# Patient Record
Sex: MALE | Race: OTHER | HISPANIC OR LATINO | URBAN - METROPOLITAN AREA
[De-identification: names, ages, dates, MRNs, and addresses within clinical notes are randomized per-mention and may not be internally consistent; named-entity substitution may affect disease eponyms.]

---

## 2017-04-29 ENCOUNTER — INPATIENT (INPATIENT)
Facility: HOSPITAL | Age: 37
LOS: 2 days | Discharge: HOME | End: 2017-05-02
Attending: INTERNAL MEDICINE | Admitting: INTERNAL MEDICINE
Payer: MEDICAID

## 2017-04-29 VITALS
OXYGEN SATURATION: 100 % | DIASTOLIC BLOOD PRESSURE: 80 MMHG | TEMPERATURE: 98 F | HEART RATE: 52 BPM | RESPIRATION RATE: 18 BRPM | SYSTOLIC BLOOD PRESSURE: 116 MMHG

## 2017-04-29 LAB
ALBUMIN SERPL ELPH-MCNC: 4.2 G/DL — SIGNIFICANT CHANGE UP (ref 3.3–5)
ALP SERPL-CCNC: 35 U/L — LOW (ref 40–120)
ALT FLD-CCNC: 10 U/L — SIGNIFICANT CHANGE UP (ref 4–41)
AST SERPL-CCNC: 12 U/L — SIGNIFICANT CHANGE UP (ref 4–40)
BASE EXCESS BLDV CALC-SCNC: 4.5 MMOL/L — SIGNIFICANT CHANGE UP
BASOPHILS # BLD AUTO: 0.03 K/UL — SIGNIFICANT CHANGE UP (ref 0–0.2)
BASOPHILS NFR BLD AUTO: 0.5 % — SIGNIFICANT CHANGE UP (ref 0–2)
BILIRUB SERPL-MCNC: 0.2 MG/DL — SIGNIFICANT CHANGE UP (ref 0.2–1.2)
BLOOD GAS VENOUS - CREATININE: 0.77 MG/DL — SIGNIFICANT CHANGE UP (ref 0.5–1.3)
BUN SERPL-MCNC: 10 MG/DL — SIGNIFICANT CHANGE UP (ref 7–23)
CALCIUM SERPL-MCNC: 9 MG/DL — SIGNIFICANT CHANGE UP (ref 8.4–10.5)
CHLORIDE BLDV-SCNC: 105 MMOL/L — SIGNIFICANT CHANGE UP (ref 96–108)
CHLORIDE SERPL-SCNC: 102 MMOL/L — SIGNIFICANT CHANGE UP (ref 98–107)
CO2 SERPL-SCNC: 26 MMOL/L — SIGNIFICANT CHANGE UP (ref 22–31)
CREAT SERPL-MCNC: 0.83 MG/DL — SIGNIFICANT CHANGE UP (ref 0.5–1.3)
EOSINOPHIL # BLD AUTO: 0.29 K/UL — SIGNIFICANT CHANGE UP (ref 0–0.5)
EOSINOPHIL NFR BLD AUTO: 4.8 % — SIGNIFICANT CHANGE UP (ref 0–6)
GAS PNL BLDV: 138 MMOL/L — SIGNIFICANT CHANGE UP (ref 136–146)
GLUCOSE BLDV-MCNC: 102 — HIGH (ref 70–99)
GLUCOSE SERPL-MCNC: 101 MG/DL — HIGH (ref 70–99)
HCO3 BLDV-SCNC: 27 MMOL/L — SIGNIFICANT CHANGE UP (ref 20–27)
HCT VFR BLD CALC: 41.3 % — SIGNIFICANT CHANGE UP (ref 39–50)
HCT VFR BLDV CALC: 41.3 % — SIGNIFICANT CHANGE UP (ref 39–51)
HGB BLD-MCNC: 13.2 G/DL — SIGNIFICANT CHANGE UP (ref 13–17)
HGB BLDV-MCNC: 13.4 G/DL — SIGNIFICANT CHANGE UP (ref 13–17)
IMM GRANULOCYTES NFR BLD AUTO: 0.2 % — SIGNIFICANT CHANGE UP (ref 0–1.5)
LACTATE BLDV-MCNC: 1.7 MMOL/L — SIGNIFICANT CHANGE UP (ref 0.5–2)
LIDOCAIN IGE QN: 23.5 U/L — SIGNIFICANT CHANGE UP (ref 7–60)
LYMPHOCYTES # BLD AUTO: 1.61 K/UL — SIGNIFICANT CHANGE UP (ref 1–3.3)
LYMPHOCYTES # BLD AUTO: 26.8 % — SIGNIFICANT CHANGE UP (ref 13–44)
MCHC RBC-ENTMCNC: 28.3 PG — SIGNIFICANT CHANGE UP (ref 27–34)
MCHC RBC-ENTMCNC: 32 % — SIGNIFICANT CHANGE UP (ref 32–36)
MCV RBC AUTO: 88.6 FL — SIGNIFICANT CHANGE UP (ref 80–100)
MONOCYTES # BLD AUTO: 0.53 K/UL — SIGNIFICANT CHANGE UP (ref 0–0.9)
MONOCYTES NFR BLD AUTO: 8.8 % — SIGNIFICANT CHANGE UP (ref 2–14)
NEUTROPHILS # BLD AUTO: 3.53 K/UL — SIGNIFICANT CHANGE UP (ref 1.8–7.4)
NEUTROPHILS NFR BLD AUTO: 58.9 % — SIGNIFICANT CHANGE UP (ref 43–77)
PCO2 BLDV: 53 MMHG — HIGH (ref 41–51)
PH BLDV: 7.36 PH — SIGNIFICANT CHANGE UP (ref 7.32–7.43)
PLATELET # BLD AUTO: 195 K/UL — SIGNIFICANT CHANGE UP (ref 150–400)
PMV BLD: 10.2 FL — SIGNIFICANT CHANGE UP (ref 7–13)
PO2 BLDV: 32 MMHG — LOW (ref 35–40)
POTASSIUM BLDV-SCNC: 3.9 MMOL/L — SIGNIFICANT CHANGE UP (ref 3.4–4.5)
POTASSIUM SERPL-MCNC: 3.8 MMOL/L — SIGNIFICANT CHANGE UP (ref 3.5–5.3)
POTASSIUM SERPL-SCNC: 3.8 MMOL/L — SIGNIFICANT CHANGE UP (ref 3.5–5.3)
PROT SERPL-MCNC: 6.7 G/DL — SIGNIFICANT CHANGE UP (ref 6–8.3)
RBC # BLD: 4.66 M/UL — SIGNIFICANT CHANGE UP (ref 4.2–5.8)
RBC # FLD: 14.2 % — SIGNIFICANT CHANGE UP (ref 10.3–14.5)
SAO2 % BLDV: 57.5 % — LOW (ref 60–85)
SODIUM SERPL-SCNC: 141 MMOL/L — SIGNIFICANT CHANGE UP (ref 135–145)
WBC # BLD: 6 K/UL — SIGNIFICANT CHANGE UP (ref 3.8–10.5)
WBC # FLD AUTO: 6 K/UL — SIGNIFICANT CHANGE UP (ref 3.8–10.5)

## 2017-04-29 PROCEDURE — 74177 CT ABD & PELVIS W/CONTRAST: CPT | Mod: 26

## 2017-04-29 RX ORDER — SODIUM CHLORIDE 9 MG/ML
2000 INJECTION INTRAMUSCULAR; INTRAVENOUS; SUBCUTANEOUS ONCE
Qty: 0 | Refills: 0 | Status: COMPLETED | OUTPATIENT
Start: 2017-04-29 | End: 2017-04-29

## 2017-04-29 RX ORDER — MORPHINE SULFATE 50 MG/1
4 CAPSULE, EXTENDED RELEASE ORAL ONCE
Qty: 0 | Refills: 0 | Status: DISCONTINUED | OUTPATIENT
Start: 2017-04-29 | End: 2017-04-29

## 2017-04-29 RX ORDER — MORPHINE SULFATE 50 MG/1
2 CAPSULE, EXTENDED RELEASE ORAL EVERY 6 HOURS
Qty: 0 | Refills: 0 | Status: DISCONTINUED | OUTPATIENT
Start: 2017-04-29 | End: 2017-05-01

## 2017-04-29 RX ADMIN — MORPHINE SULFATE 2 MILLIGRAM(S): 50 CAPSULE, EXTENDED RELEASE ORAL at 22:05

## 2017-04-29 RX ADMIN — MORPHINE SULFATE 2 MILLIGRAM(S): 50 CAPSULE, EXTENDED RELEASE ORAL at 21:50

## 2017-04-29 RX ADMIN — SODIUM CHLORIDE 2000 MILLILITER(S): 9 INJECTION INTRAMUSCULAR; INTRAVENOUS; SUBCUTANEOUS at 20:25

## 2017-04-29 NOTE — ED ADULT NURSE REASSESSMENT NOTE - NS ED NURSE REASSESS COMMENT FT1
Patient states no change in pain after 2mg Morphine.  Will notified MD of patient pain and reassess patient.

## 2017-04-29 NOTE — ED ADULT TRIAGE NOTE - CHIEF COMPLAINT QUOTE
pt states I have flare up of ulcerative colitis with chrohns . c/o abd pain and blood in stool 8/10 pain  dealing with pain for about week

## 2017-04-29 NOTE — ED PROVIDER NOTE - PROGRESS NOTE DETAILS
CBC and CMP WNL. will get CT abd/pelvis with contrast called Alea Eric MD at [phone] - > [phone], left message about admission

## 2017-04-29 NOTE — ED PROVIDER NOTE - ATTENDING CONTRIBUTION TO CARE
I performed a face to face evaluation of this patient and performed a full history and physical examination on the patient.  I agree with the resident's history, physical examination, and plan of the patient.  35yo M PMH: UC p/w 1wk h/o worsening abdominal pain, similar to prior exacerbation prompting ED visit. Patient reports recent evaluation at [facility] with mixed picture of UC / Chron's  On exam awake & alert, mild distress, mmm, lungs CTAB no wheeze no crackle, RRR, abdomen soft diffuse tenderness, neuro A&Ox3,  skin warm and dry no rash

## 2017-04-29 NOTE — ED ADULT NURSE NOTE - OBJECTIVE STATEMENT
Pt presents to room 9, A&Ox3, ambulatory at baseline without assistance, coming in for evaluation of diffuse abdominal pain and multiple episodes of loose and bloody stool.  States blood is bright red.  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  Reports symptoms are consistent with UC/Crohns flair. Pt denies any chest pain, dizziness, nausea, vomiting, shortness of breath, palpitations, fever, or chills.  IV established in right ac with a 20g, labs drawn and sent, call bell in reach, side rails up, bed in locked position, md evaluation in progress, will continue to monitor.

## 2017-04-29 NOTE — ED PROVIDER NOTE - OBJECTIVE STATEMENT
35YO M PMHx of UC and crohns p/w abdominal pain and blood in stool for 1 week. Pt states this is his normal crohn's flare. c/o intermittent 8/10 epigastric pain and bright red blood as well as dark blood mixed in his stool. compliant on his UC meds. 35YO M PMHx of UC p/w abdominal pain and blood in stool for 1 week. Pt states this is his normal crohn's flare. c/o intermittent 8/10 epigastric pain, rectal pain, and bright red blood as well as dark blood mixed in his stool. compliant on his UC meds of apriso, canasa suppositories, and probiotic. Otherwise no fevers, chills, n/v, abdominal distension. Tolerating food but decrease PO intake because knows will cause diarrhea. States believes stress of getting disability and issues with his job caused his flare. States his GI is Dr. Nii Farr; [place]

## 2017-04-29 NOTE — ED PROVIDER NOTE - MEDICAL DECISION MAKING DETAILS
patient has a h/o UC and is coming in for abdom pain and blood in stool, this is his normal UC flare symptoms. CT abd/pelvis ordered.

## 2017-04-30 DIAGNOSIS — R63.8 OTHER SYMPTOMS AND SIGNS CONCERNING FOOD AND FLUID INTAKE: ICD-10-CM

## 2017-04-30 DIAGNOSIS — Z87.09 PERSONAL HISTORY OF OTHER DISEASES OF THE RESPIRATORY SYSTEM: Chronic | ICD-10-CM

## 2017-04-30 DIAGNOSIS — R39.15 URGENCY OF URINATION: ICD-10-CM

## 2017-04-30 DIAGNOSIS — Z29.9 ENCOUNTER FOR PROPHYLACTIC MEASURES, UNSPECIFIED: ICD-10-CM

## 2017-04-30 DIAGNOSIS — K51.90 ULCERATIVE COLITIS, UNSPECIFIED, WITHOUT COMPLICATIONS: ICD-10-CM

## 2017-04-30 DIAGNOSIS — K51.311 ULCERATIVE (CHRONIC) RECTOSIGMOIDITIS WITH RECTAL BLEEDING: ICD-10-CM

## 2017-04-30 DIAGNOSIS — B00.9 HERPESVIRAL INFECTION, UNSPECIFIED: ICD-10-CM

## 2017-04-30 LAB
APPEARANCE UR: CLEAR — SIGNIFICANT CHANGE UP
BILIRUB UR-MCNC: NEGATIVE — SIGNIFICANT CHANGE UP
BLOOD UR QL VISUAL: NEGATIVE — SIGNIFICANT CHANGE UP
BUN SERPL-MCNC: 8 MG/DL — SIGNIFICANT CHANGE UP (ref 7–23)
CALCIUM SERPL-MCNC: 8.5 MG/DL — SIGNIFICANT CHANGE UP (ref 8.4–10.5)
CHLORIDE SERPL-SCNC: 106 MMOL/L — SIGNIFICANT CHANGE UP (ref 98–107)
CO2 SERPL-SCNC: 23 MMOL/L — SIGNIFICANT CHANGE UP (ref 22–31)
COLOR SPEC: SIGNIFICANT CHANGE UP
CREAT SERPL-MCNC: 0.64 MG/DL — SIGNIFICANT CHANGE UP (ref 0.5–1.3)
ERYTHROCYTE [SEDIMENTATION RATE] IN BLOOD: 3 MM/HR — SIGNIFICANT CHANGE UP (ref 1–15)
GLUCOSE SERPL-MCNC: 91 MG/DL — SIGNIFICANT CHANGE UP (ref 70–99)
GLUCOSE UR-MCNC: NEGATIVE — SIGNIFICANT CHANGE UP
HBA1C BLD-MCNC: 5.9 % — HIGH (ref 4–5.6)
HCT VFR BLD CALC: 39.4 % — SIGNIFICANT CHANGE UP (ref 39–50)
HGB BLD-MCNC: 12.7 G/DL — LOW (ref 13–17)
KETONES UR-MCNC: NEGATIVE — SIGNIFICANT CHANGE UP
LEUKOCYTE ESTERASE UR-ACNC: NEGATIVE — SIGNIFICANT CHANGE UP
MAGNESIUM SERPL-MCNC: 2 MG/DL — SIGNIFICANT CHANGE UP (ref 1.6–2.6)
MCHC RBC-ENTMCNC: 28.5 PG — SIGNIFICANT CHANGE UP (ref 27–34)
MCHC RBC-ENTMCNC: 32.2 % — SIGNIFICANT CHANGE UP (ref 32–36)
MCV RBC AUTO: 88.5 FL — SIGNIFICANT CHANGE UP (ref 80–100)
MUCOUS THREADS # UR AUTO: SIGNIFICANT CHANGE UP
NITRITE UR-MCNC: NEGATIVE — SIGNIFICANT CHANGE UP
PH UR: 6 — SIGNIFICANT CHANGE UP (ref 4.6–8)
PHOSPHATE SERPL-MCNC: 3.8 MG/DL — SIGNIFICANT CHANGE UP (ref 2.5–4.5)
PLATELET # BLD AUTO: 178 K/UL — SIGNIFICANT CHANGE UP (ref 150–400)
PMV BLD: 10.4 FL — SIGNIFICANT CHANGE UP (ref 7–13)
POTASSIUM SERPL-MCNC: 4 MMOL/L — SIGNIFICANT CHANGE UP (ref 3.5–5.3)
POTASSIUM SERPL-SCNC: 4 MMOL/L — SIGNIFICANT CHANGE UP (ref 3.5–5.3)
PROT UR-MCNC: NEGATIVE — SIGNIFICANT CHANGE UP
RBC # BLD: 4.45 M/UL — SIGNIFICANT CHANGE UP (ref 4.2–5.8)
RBC # FLD: 14.3 % — SIGNIFICANT CHANGE UP (ref 10.3–14.5)
RBC CASTS # UR COMP ASSIST: SIGNIFICANT CHANGE UP (ref 0–?)
SODIUM SERPL-SCNC: 142 MMOL/L — SIGNIFICANT CHANGE UP (ref 135–145)
SP GR SPEC: 1.02 — SIGNIFICANT CHANGE UP (ref 1–1.03)
TSH SERPL-MCNC: 5.96 UIU/ML — HIGH (ref 0.27–4.2)
UROBILINOGEN FLD QL: NORMAL E.U. — SIGNIFICANT CHANGE UP (ref 0.1–0.2)
VIT B12 SERPL-MCNC: 305 PG/ML — SIGNIFICANT CHANGE UP (ref 200–900)
WBC # BLD: 7.42 K/UL — SIGNIFICANT CHANGE UP (ref 3.8–10.5)
WBC # FLD AUTO: 7.42 K/UL — SIGNIFICANT CHANGE UP (ref 3.8–10.5)
WBC UR QL: SIGNIFICANT CHANGE UP (ref 0–?)

## 2017-04-30 PROCEDURE — 99223 1ST HOSP IP/OBS HIGH 75: CPT

## 2017-04-30 RX ORDER — ACETAMINOPHEN 500 MG
650 TABLET ORAL ONCE
Qty: 0 | Refills: 0 | Status: COMPLETED | OUTPATIENT
Start: 2017-04-30 | End: 2017-04-30

## 2017-04-30 RX ORDER — SODIUM CHLORIDE 9 MG/ML
1000 INJECTION INTRAMUSCULAR; INTRAVENOUS; SUBCUTANEOUS
Qty: 0 | Refills: 0 | Status: DISCONTINUED | OUTPATIENT
Start: 2017-04-30 | End: 2017-05-02

## 2017-04-30 RX ORDER — FOLIC ACID 0.8 MG
1 TABLET ORAL DAILY
Qty: 0 | Refills: 0 | Status: DISCONTINUED | OUTPATIENT
Start: 2017-04-30 | End: 2017-05-02

## 2017-04-30 RX ORDER — MESALAMINE 400 MG
4 TABLET, DELAYED RELEASE (ENTERIC COATED) ORAL AT BEDTIME
Qty: 0 | Refills: 0 | Status: DISCONTINUED | OUTPATIENT
Start: 2017-04-30 | End: 2017-04-30

## 2017-04-30 RX ORDER — PANTOPRAZOLE SODIUM 20 MG/1
40 TABLET, DELAYED RELEASE ORAL
Qty: 0 | Refills: 0 | Status: DISCONTINUED | OUTPATIENT
Start: 2017-04-30 | End: 2017-05-02

## 2017-04-30 RX ADMIN — Medication 1 DROP(S): at 13:00

## 2017-04-30 RX ADMIN — Medication 1 DROP(S): at 20:38

## 2017-04-30 RX ADMIN — SODIUM CHLORIDE 125 MILLILITER(S): 9 INJECTION INTRAMUSCULAR; INTRAVENOUS; SUBCUTANEOUS at 12:23

## 2017-04-30 RX ADMIN — MORPHINE SULFATE 2 MILLIGRAM(S): 50 CAPSULE, EXTENDED RELEASE ORAL at 13:15

## 2017-04-30 RX ADMIN — PANTOPRAZOLE SODIUM 40 MILLIGRAM(S): 20 TABLET, DELAYED RELEASE ORAL at 06:00

## 2017-04-30 RX ADMIN — MORPHINE SULFATE 2 MILLIGRAM(S): 50 CAPSULE, EXTENDED RELEASE ORAL at 06:48

## 2017-04-30 RX ADMIN — MORPHINE SULFATE 2 MILLIGRAM(S): 50 CAPSULE, EXTENDED RELEASE ORAL at 20:52

## 2017-04-30 RX ADMIN — SODIUM CHLORIDE 125 MILLILITER(S): 9 INJECTION INTRAMUSCULAR; INTRAVENOUS; SUBCUTANEOUS at 07:22

## 2017-04-30 RX ADMIN — MORPHINE SULFATE 2 MILLIGRAM(S): 50 CAPSULE, EXTENDED RELEASE ORAL at 06:33

## 2017-04-30 RX ADMIN — SODIUM CHLORIDE 125 MILLILITER(S): 9 INJECTION INTRAMUSCULAR; INTRAVENOUS; SUBCUTANEOUS at 20:38

## 2017-04-30 RX ADMIN — Medication 1 MILLIGRAM(S): at 12:58

## 2017-04-30 RX ADMIN — Medication 650 MILLIGRAM(S): at 23:46

## 2017-04-30 RX ADMIN — Medication 1 DROP(S): at 06:01

## 2017-04-30 RX ADMIN — Medication 20 MILLIGRAM(S): at 06:00

## 2017-04-30 RX ADMIN — MORPHINE SULFATE 4 MILLIGRAM(S): 50 CAPSULE, EXTENDED RELEASE ORAL at 00:13

## 2017-04-30 RX ADMIN — MORPHINE SULFATE 2 MILLIGRAM(S): 50 CAPSULE, EXTENDED RELEASE ORAL at 12:58

## 2017-04-30 RX ADMIN — Medication 650 MILLIGRAM(S): at 23:01

## 2017-04-30 RX ADMIN — Medication 20 MILLIGRAM(S): at 13:00

## 2017-04-30 RX ADMIN — Medication 20 MILLIGRAM(S): at 20:38

## 2017-04-30 RX ADMIN — MORPHINE SULFATE 2 MILLIGRAM(S): 50 CAPSULE, EXTENDED RELEASE ORAL at 20:37

## 2017-04-30 RX ADMIN — MORPHINE SULFATE 4 MILLIGRAM(S): 50 CAPSULE, EXTENDED RELEASE ORAL at 00:02

## 2017-04-30 NOTE — PATIENT PROFILE ADULT. - NS TRANSFER PATIENT BELONGINGS
iphone; ipad; wallet; duffle bag/Cell Phone/PDA (specify)/Other belongings/Electronic Device (specify)/Jewelry/Money (specify)/Clothing

## 2017-04-30 NOTE — H&P ADULT. - HISTORY OF PRESENT ILLNESS
36 year old male, with PH significant for Ulcerative colitis, Condyloma, Herpes simplex II infection, R-Knee surgery, presented to the ED secondary to abdominal pain associated with bloody stools for one week.  Seen and evaluated at bedside;      Vital signs upon ED presentation as follows: BP - 116/80, HR = 52, RR = 18, T = 36.4 C (97.6 F), O2 Sat = 100% on RA.  CT A/P indicative of: "Ulcerative colitis flare of the sigmoid colon and rectum."   Diagnosed with Ulcerative Colitis.  Prescribed NaCl 2 liters, Morphine 4 mg IV x one, Morphine 2 mg IV Q6H PRN. 36 year old male, with PH significant for Ulcerative colitis, Condyloma accuminatum, Herpes simplex II infection, R-Knee surgery, presented to the ED secondary to abdominal pain associated with bloody stools for one week.  Seen and evaluated at bedside; NAD.  Patient relates that for the past 7 days he has noticing chiefly hematochezia with intermittent melena.  Initially he presumed the blood to be from hemorrhoids, but then he began to suffer with abdominal pain - chiefly of the mid upper abdominal area, with tenderness across the lower abdomen.  Patient called his gastroenterologist who advised use of suppository, enema, ice and baths (?Sitz baths).  However, although some relief was obtained from the use of suppository, the signs/symptoms persisted.  Pain was rated at 10/10 at maximum intensity and was ~ 4/10 at the time of being seen (s/p Morphine 4 mg IV).  Patient began to experience lethargy, somnolence and decreased oral intake.  No reports of fevers, chills, sweats, nausea, vomiting.  Reports compliance with prescribed medications - Apriso, Canasa and Valtrex.  Thinks that his flare is triggered by stress related to currently being on disability (due to Ulcerative colitis) and issues relative to same.    Comments that he was evaluated at the [facility] approximately one year ago and was told that he had a component of Crohn's disease in conjunction with Ulcerative colitis.    Vital signs upon ED presentation as follows: BP - 116/80, HR = 52, RR = 18, T = 36.4 C (97.6 F), O2 Sat = 100% on RA.  CT A/P indicative of: "Ulcerative colitis flare of the sigmoid colon and rectum."   Diagnosed with Ulcerative Colitis.  Prescribed NaCl 2 liters, Morphine 4 mg IV x one, Morphine 2 mg IV Q6H PRN.

## 2017-04-30 NOTE — H&P ADULT. - PROBLEM SELECTOR PLAN 3
- reports being told prostate enlarged ("normal" on CT in-House)  - has been experiencing urinary frequency  - f/u urinalysis  - PSA level

## 2017-04-30 NOTE — H&P ADULT. - ASSESSMENT
36 year old male, with PH significant for Ulcerative colitis, Condyloma accuminatum, Herpes simplex II infection, R-Knee surgery, presented to the ED secondary to abdominal pain associated with bloody stools for one week.  Vital signs upon ED presentation as follows: BP - 116/80, HR = 52, RR = 18, T = 36.4 C (97.6 F), O2 Sat = 100% on RA.  CT A/P indicative of: "Ulcerative colitis flare of the sigmoid colon and rectum."   Diagnosed with Ulcerative.  Admitted for further management of Ulcerative rectosigmoiditis with rectal bleeding, Herpes simplex type 2 infection, Urgency of micturition...

## 2017-04-30 NOTE — H&P ADULT. - PROBLEM SELECTOR PLAN 2
- no issues presently  - takes Valtrex (dose unknown) during flares (please verify medication dosage with OP pharmacy)

## 2017-04-30 NOTE — H&P ADULT. - FAMILY HISTORY
Grandparent  Still living? Unknown  Family history of diabetes mellitus, Age at diagnosis: Age Unknown  Family history of cerebrovascular accident, Age at diagnosis: Age Unknown     Father  Still living? Unknown  Family history of hypertension, Age at diagnosis: Age Unknown     Uncle  Still living? Unknown  Family history of diabetes mellitus, Age at diagnosis: Age Unknown

## 2017-04-30 NOTE — H&P ADULT. - PROBLEM SELECTOR PLAN 1
- persistent signs/symptoms x 1 week (abd pain, with subsequent hematochezia/melena)  - not ameliorated with suppositories, enema, ice packs or baths  - thinks triggered by stress  - cites compliance with OP medication regimen (Apriso, Canasa)  - started on Prednisone 20 mg Q8H  - report of allergy to Mesalamine - not started presently, even though patient was discharged on same at last admission  - IVF hydration  - Morphine PRN  - GI consult in the AM (e-mailed; please follow-up)

## 2017-04-30 NOTE — H&P ADULT. - PMH
Condyloma    Herpes simplex type 2 infection    Polyp of colon, unspecified part of colon, unspecified type  ~ 60 per documentation in previous H&P (per Dr Gary - Surgery)  Ulcerative colitis

## 2017-04-30 NOTE — H&P ADULT. - GASTROINTESTINAL COMMENTS
pain of mid upper abd and tenderness across lower abdomen occasional guarding, moderately hyperactive bowel sounds, tenderness over mid upper abd/epigastrium and across lower abdomen

## 2017-05-01 PROCEDURE — 45331 SIGMOIDOSCOPY AND BIOPSY: CPT | Mod: GC

## 2017-05-01 RX ORDER — MESALAMINE 400 MG
0.38 TABLET, DELAYED RELEASE (ENTERIC COATED) ORAL DAILY
Qty: 0 | Refills: 0 | Status: DISCONTINUED | OUTPATIENT
Start: 2017-05-01 | End: 2017-05-02

## 2017-05-01 RX ORDER — MORPHINE SULFATE 50 MG/1
2 CAPSULE, EXTENDED RELEASE ORAL EVERY 6 HOURS
Qty: 0 | Refills: 0 | Status: DISCONTINUED | OUTPATIENT
Start: 2017-05-01 | End: 2017-05-02

## 2017-05-01 RX ORDER — MESALAMINE 400 MG
1000 TABLET, DELAYED RELEASE (ENTERIC COATED) ORAL EVERY 6 HOURS
Qty: 0 | Refills: 0 | Status: DISCONTINUED | OUTPATIENT
Start: 2017-05-01 | End: 2017-05-01

## 2017-05-01 RX ORDER — MESALAMINE 400 MG
4 TABLET, DELAYED RELEASE (ENTERIC COATED) ORAL AT BEDTIME
Qty: 0 | Refills: 0 | Status: DISCONTINUED | OUTPATIENT
Start: 2017-05-01 | End: 2017-05-02

## 2017-05-01 RX ADMIN — Medication 1 DROP(S): at 22:51

## 2017-05-01 RX ADMIN — MORPHINE SULFATE 2 MILLIGRAM(S): 50 CAPSULE, EXTENDED RELEASE ORAL at 19:34

## 2017-05-01 RX ADMIN — Medication 0.38 GRAM(S): at 22:51

## 2017-05-01 RX ADMIN — Medication 40 MILLIGRAM(S): at 19:18

## 2017-05-01 RX ADMIN — MORPHINE SULFATE 2 MILLIGRAM(S): 50 CAPSULE, EXTENDED RELEASE ORAL at 19:19

## 2017-05-01 RX ADMIN — MORPHINE SULFATE 2 MILLIGRAM(S): 50 CAPSULE, EXTENDED RELEASE ORAL at 10:13

## 2017-05-01 RX ADMIN — Medication 1 DROP(S): at 06:06

## 2017-05-01 RX ADMIN — MORPHINE SULFATE 2 MILLIGRAM(S): 50 CAPSULE, EXTENDED RELEASE ORAL at 09:58

## 2017-05-01 RX ADMIN — Medication 4 GRAM(S): at 22:50

## 2017-05-02 ENCOUNTER — TRANSCRIPTION ENCOUNTER (OUTPATIENT)
Age: 37
End: 2017-05-02

## 2017-05-02 VITALS
TEMPERATURE: 99 F | HEART RATE: 74 BPM | OXYGEN SATURATION: 100 % | DIASTOLIC BLOOD PRESSURE: 69 MMHG | RESPIRATION RATE: 18 BRPM | SYSTOLIC BLOOD PRESSURE: 129 MMHG

## 2017-05-02 LAB
BUN SERPL-MCNC: 8 MG/DL — SIGNIFICANT CHANGE UP (ref 7–23)
CALCIUM SERPL-MCNC: 8.4 MG/DL — SIGNIFICANT CHANGE UP (ref 8.4–10.5)
CHLORIDE SERPL-SCNC: 104 MMOL/L — SIGNIFICANT CHANGE UP (ref 98–107)
CO2 SERPL-SCNC: 22 MMOL/L — SIGNIFICANT CHANGE UP (ref 22–31)
CREAT SERPL-MCNC: 0.73 MG/DL — SIGNIFICANT CHANGE UP (ref 0.5–1.3)
GLUCOSE SERPL-MCNC: 173 MG/DL — HIGH (ref 70–99)
HBV SURFACE AB SER-ACNC: NONREACTIVE — SIGNIFICANT CHANGE UP
HCT VFR BLD CALC: 40.8 % — SIGNIFICANT CHANGE UP (ref 39–50)
HGB BLD-MCNC: 12.9 G/DL — LOW (ref 13–17)
MCHC RBC-ENTMCNC: 28 PG — SIGNIFICANT CHANGE UP (ref 27–34)
MCHC RBC-ENTMCNC: 31.6 % — LOW (ref 32–36)
MCV RBC AUTO: 88.5 FL — SIGNIFICANT CHANGE UP (ref 80–100)
PLATELET # BLD AUTO: 202 K/UL — SIGNIFICANT CHANGE UP (ref 150–400)
PMV BLD: 10.3 FL — SIGNIFICANT CHANGE UP (ref 7–13)
POTASSIUM SERPL-MCNC: 4.3 MMOL/L — SIGNIFICANT CHANGE UP (ref 3.5–5.3)
POTASSIUM SERPL-SCNC: 4.3 MMOL/L — SIGNIFICANT CHANGE UP (ref 3.5–5.3)
RBC # BLD: 4.61 M/UL — SIGNIFICANT CHANGE UP (ref 4.2–5.8)
RBC # FLD: 13.9 % — SIGNIFICANT CHANGE UP (ref 10.3–14.5)
SODIUM SERPL-SCNC: 139 MMOL/L — SIGNIFICANT CHANGE UP (ref 135–145)
SPECIMEN SOURCE: SIGNIFICANT CHANGE UP
WBC # BLD: 8.05 K/UL — SIGNIFICANT CHANGE UP (ref 3.8–10.5)
WBC # FLD AUTO: 8.05 K/UL — SIGNIFICANT CHANGE UP (ref 3.8–10.5)

## 2017-05-02 PROCEDURE — 99232 SBSQ HOSP IP/OBS MODERATE 35: CPT | Mod: GC

## 2017-05-02 RX ORDER — MESALAMINE 400 MG
60 TABLET, DELAYED RELEASE (ENTERIC COATED) ORAL
Qty: 30 | Refills: 0 | OUTPATIENT
Start: 2017-05-02 | End: 2017-06-01

## 2017-05-02 RX ORDER — MESALAMINE 400 MG
4 TABLET, DELAYED RELEASE (ENTERIC COATED) ORAL
Qty: 0 | Refills: 0 | COMMUNITY

## 2017-05-02 RX ORDER — FOLIC ACID 0.8 MG
1 TABLET ORAL
Qty: 30 | Refills: 0 | OUTPATIENT
Start: 2017-05-02 | End: 2017-06-01

## 2017-05-02 RX ORDER — PANTOPRAZOLE SODIUM 20 MG/1
1 TABLET, DELAYED RELEASE ORAL
Qty: 30 | Refills: 0 | OUTPATIENT
Start: 2017-05-02 | End: 2017-06-01

## 2017-05-02 RX ADMIN — Medication 1 DROP(S): at 13:28

## 2017-05-02 RX ADMIN — PANTOPRAZOLE SODIUM 40 MILLIGRAM(S): 20 TABLET, DELAYED RELEASE ORAL at 06:52

## 2017-05-02 RX ADMIN — MORPHINE SULFATE 2 MILLIGRAM(S): 50 CAPSULE, EXTENDED RELEASE ORAL at 04:30

## 2017-05-02 RX ADMIN — Medication 0.38 GRAM(S): at 11:48

## 2017-05-02 RX ADMIN — MORPHINE SULFATE 2 MILLIGRAM(S): 50 CAPSULE, EXTENDED RELEASE ORAL at 04:08

## 2017-05-02 RX ADMIN — MORPHINE SULFATE 2 MILLIGRAM(S): 50 CAPSULE, EXTENDED RELEASE ORAL at 13:27

## 2017-05-02 RX ADMIN — Medication 40 MILLIGRAM(S): at 06:52

## 2017-05-02 RX ADMIN — Medication 1 MILLIGRAM(S): at 11:47

## 2017-05-02 RX ADMIN — MORPHINE SULFATE 2 MILLIGRAM(S): 50 CAPSULE, EXTENDED RELEASE ORAL at 13:42

## 2017-05-02 NOTE — DISCHARGE NOTE ADULT - CARE PROVIDERS DIRECT ADDRESSES
[email or web address]

## 2017-05-02 NOTE — DISCHARGE NOTE ADULT - CARE PLAN
Principal Discharge DX:	Ulcerative colitis without complications, unspecified location  Goal:	Pt will be free from  Instructions for follow-up, activity and diet:	Follow up with your GI doctor in 1-2 weeks. Call for an appointment Principal Discharge DX:	Ulcerative colitis without complications, unspecified location  Goal:	Pt will be free from  Instructions for follow-up, activity and diet:	Follow up with your GI doctor in 1 week. Call for an appointment

## 2017-05-02 NOTE — DISCHARGE NOTE ADULT - PATIENT PORTAL LINK FT
“You can access the [facility] Patient Portal, offered by [facility], by registering with the following website: [email or web address]”

## 2017-05-02 NOTE — DISCHARGE NOTE ADULT - MEDICATION SUMMARY - MEDICATIONS TO STOP TAKING
I will STOP taking the medications listed below when I get home from the hospital:    balsalazide 750 mg oral capsule  -- 3 cap(s) by mouth 3 times a day  -- Finish all this medication unless otherwise directed by prescriber.

## 2017-05-02 NOTE — DISCHARGE NOTE ADULT - PLAN OF CARE
Follow up with your GI doctor in 1-2 weeks. Call for an appointment Pt will be free from Follow up with your GI doctor in 1 week. Call for an appointment

## 2017-05-02 NOTE — DISCHARGE NOTE ADULT - MEDICATION SUMMARY - MEDICATIONS TO TAKE
I will START or STAY ON the medications listed below when I get home from the hospital:    mesalamine 4 g/60 mL rectal enema  -- 60 milliliter(s) rectally once a day (at bedtime)  -- Indication: For Ulcerative colitis     Apriso 0.375 g oral capsule, extended release  -- 4 cap(s) by mouth once a day  -- Indication: For Ulcerative colitis    predniSONE 20 mg oral tablet  -- 2 tab(s) by mouth once a day  -- Indication: For Ulcerative colitis     ocular lubricant ophthalmic solution  -- 1 drop(s) to each affected eye every 8 hours, As Needed  -- Indication: For Dry Eyes    pantoprazole 40 mg oral delayed release tablet  -- 1 tab(s) by mouth once a day (before a meal)  -- Indication: For GI ppx    folic acid 1 mg oral tablet  -- 1 tab(s) by mouth once a day  -- Indication: For Supplement I will START or STAY ON the medications listed below when I get home from the hospital:    mesalamine 4 g/60 mL rectal enema  -- 60 milliliter(s) rectally once a day (at bedtime)  -- Indication: For Ulcerative colitis     Apriso 0.375 g oral capsule, extended release  -- 1 cap(s) by mouth once a day  -- Indication: For Ulcerative colitis without complications    predniSONE 20 mg oral tablet  -- 2 tab(s) by mouth once a day  -- Indication: For Ulcerative colitis     ocular lubricant ophthalmic solution  -- 1 drop(s) to each affected eye every 8 hours, As Needed  -- Indication: For Dry Eyes    pantoprazole 40 mg oral delayed release tablet  -- 1 tab(s) by mouth once a day (before a meal)  -- Indication: For GI ppx    folic acid 1 mg oral tablet  -- 1 tab(s) by mouth once a day  -- Indication: For Supplement

## 2017-05-02 NOTE — DISCHARGE NOTE ADULT - CARE PROVIDER_API CALL
Brannon Garces), Gastroenterology; Internal Medicine  [address] [address] [place] [id number]  Phone: [phone]  Fax: [phone] Hernesto Cosby (MD), Internal Medicine  [address] [place] [address]  Phone: [phone]  Fax: [phone]

## 2017-05-02 NOTE — DISCHARGE NOTE ADULT - HOSPITAL COURSE
36 year old male, with PH significant for Ulcerative colitis, Condyloma acuminatum Herpes simplex II infection, R-Knee surgery, presented to the ED secondary to abdominal pain associated with bloody stools for one week. CT A/P indicative of: "Ulcerative colitis flare of the sigmoid colon and rectum."   Diagnosed with Ulcerative.  Admitted for further management of Ulcerative rectosigmoiditis with rectal bleeding, Herpes simplex type 2 infection, Urgency of micturition      Hospital Course   Ulcerative rectosigmoiditis with rectal bleeding.    - Persistent signs/symptoms x 1 wk  - Cites compliance with OP medication regimen (Apriso, Canasa)  - Prednisone   - Protonix daily.   - IVF hydration  - Morphine PRN  - House GI      - Flex sig: Polypoid lesion in the descending and transverse colon likely inflammatory pseudopolyps. Biopsied. Normal mucosa in the descending and transverse  colon biopsied      - Stool Cx- Pending results        Herpes simplex type 2 infection.    - no issues presently  - takes Valtrex (dose unknown) during flares. Filled prescription and has it if he needs it.      Urgency of urination  - reports being told prostate enlarged ("normal" on CT in-House)  - has been experiencing urinary frequency  - UA negative   - PSA level. WNL      Need for prophylactic measure  - OOB to chair BID  - ambulate as tolerated        Dispo: Home 36 year old male, with PH significant for Ulcerative colitis, Condyloma acuminatum Herpes simplex II infection, R-Knee surgery, presented to the ED secondary to abdominal pain associated with bloody stools for one week. CT A/P indicative of: "Ulcerative colitis flare of the sigmoid colon and rectum."   Diagnosed with Ulcerative.  Admitted for further management of Ulcerative rectosigmoiditis with rectal bleeding, Herpes simplex type 2 infection, Urgency of micturition      Hospital Course   Ulcerative rectosigmoiditis with rectal bleeding.    - Persistent signs/symptoms x 1 wk  - Cites compliance with OP medication regimen (Apriso, Canasa)  - Prednisone   - Protonix daily.   - IVF hydration  - Morphine PRN  - House GI      - Flex sig: Polypoid lesion in the descending and transverse colon likely inflammatory pseudopolyps. Biopsied. Normal mucosa in the descending and transverse  colon biopsied      - Stool Cx- Negative         Herpes simplex type 2 infection.    - no issues presently  - takes Valtrex (dose unknown) during flares. Filled prescription and has it if he needs it.      Urgency of urination  - reports being told prostate enlarged ("normal" on CT in-House)  - has been experiencing urinary frequency  - UA negative   - PSA level. WNL      Need for prophylactic measure  - OOB to chair BID  - ambulate as tolerated        Dispo: Home

## 2017-05-03 LAB
BACTERIA STL CULT: SIGNIFICANT CHANGE UP
M TB TUBERC IFN-G BLD QL: 0 IU/ML — SIGNIFICANT CHANGE UP
M TB TUBERC IFN-G BLD QL: 0.02 IU/ML — SIGNIFICANT CHANGE UP
M TB TUBERC IFN-G BLD QL: NEGATIVE — SIGNIFICANT CHANGE UP
MITOGEN IGNF BCKGRD COR BLD-ACNC: 9.18 IU/ML — SIGNIFICANT CHANGE UP
O+P SPEC CONC: SIGNIFICANT CHANGE UP
SPECIMEN SOURCE: SIGNIFICANT CHANGE UP
TRI STN SPEC: SIGNIFICANT CHANGE UP

## 2017-08-19 ENCOUNTER — INPATIENT (INPATIENT)
Facility: HOSPITAL | Age: 37
LOS: 2 days | Discharge: HOME | End: 2017-08-22
Attending: INTERNAL MEDICINE | Admitting: INTERNAL MEDICINE
Payer: MEDICAID

## 2017-08-19 VITALS
HEART RATE: 55 BPM | SYSTOLIC BLOOD PRESSURE: 132 MMHG | TEMPERATURE: 98 F | WEIGHT: 169.98 LBS | OXYGEN SATURATION: 100 % | HEIGHT: 71 IN | RESPIRATION RATE: 17 BRPM | DIASTOLIC BLOOD PRESSURE: 81 MMHG

## 2017-08-19 DIAGNOSIS — K51.919 ULCERATIVE COLITIS, UNSPECIFIED WITH UNSPECIFIED COMPLICATIONS: ICD-10-CM

## 2017-08-19 DIAGNOSIS — K63.5 POLYP OF COLON: ICD-10-CM

## 2017-08-19 DIAGNOSIS — Z87.09 PERSONAL HISTORY OF OTHER DISEASES OF THE RESPIRATORY SYSTEM: Chronic | ICD-10-CM

## 2017-08-19 LAB
ALBUMIN SERPL ELPH-MCNC: 4.5 G/DL — SIGNIFICANT CHANGE UP (ref 3.3–5)
ALP SERPL-CCNC: 33 U/L — LOW (ref 40–120)
ALT FLD-CCNC: 15 U/L — SIGNIFICANT CHANGE UP (ref 4–41)
APPEARANCE UR: CLEAR — SIGNIFICANT CHANGE UP
AST SERPL-CCNC: 35 U/L — SIGNIFICANT CHANGE UP (ref 4–40)
BASE EXCESS BLDV CALC-SCNC: 2.3 MMOL/L — SIGNIFICANT CHANGE UP
BASOPHILS # BLD AUTO: 0.03 K/UL — SIGNIFICANT CHANGE UP (ref 0–0.2)
BASOPHILS NFR BLD AUTO: 0.4 % — SIGNIFICANT CHANGE UP (ref 0–2)
BILIRUB SERPL-MCNC: 0.3 MG/DL — SIGNIFICANT CHANGE UP (ref 0.2–1.2)
BILIRUB UR-MCNC: NEGATIVE — SIGNIFICANT CHANGE UP
BLOOD GAS VENOUS - CREATININE: 0.73 MG/DL — SIGNIFICANT CHANGE UP (ref 0.5–1.3)
BLOOD UR QL VISUAL: NEGATIVE — SIGNIFICANT CHANGE UP
BUN SERPL-MCNC: 5 MG/DL — LOW (ref 7–23)
BUN SERPL-MCNC: 7 MG/DL — SIGNIFICANT CHANGE UP (ref 7–23)
CALCIUM SERPL-MCNC: 9.5 MG/DL — SIGNIFICANT CHANGE UP (ref 8.4–10.5)
CALCIUM SERPL-MCNC: 9.7 MG/DL — SIGNIFICANT CHANGE UP (ref 8.4–10.5)
CHLORIDE BLDV-SCNC: 100 MMOL/L — SIGNIFICANT CHANGE UP (ref 96–108)
CHLORIDE SERPL-SCNC: 100 MMOL/L — SIGNIFICANT CHANGE UP (ref 98–107)
CHLORIDE SERPL-SCNC: 97 MMOL/L — LOW (ref 98–107)
CO2 SERPL-SCNC: 19 MMOL/L — LOW (ref 22–31)
CO2 SERPL-SCNC: 26 MMOL/L — SIGNIFICANT CHANGE UP (ref 22–31)
COLOR SPEC: YELLOW — SIGNIFICANT CHANGE UP
CREAT SERPL-MCNC: 0.75 MG/DL — SIGNIFICANT CHANGE UP (ref 0.5–1.3)
CREAT SERPL-MCNC: 0.87 MG/DL — SIGNIFICANT CHANGE UP (ref 0.5–1.3)
EOSINOPHIL # BLD AUTO: 0.24 K/UL — SIGNIFICANT CHANGE UP (ref 0–0.5)
EOSINOPHIL NFR BLD AUTO: 3.2 % — SIGNIFICANT CHANGE UP (ref 0–6)
GAS PNL BLDV: 131 MMOL/L — LOW (ref 136–146)
GLUCOSE BLDV-MCNC: 137 — HIGH (ref 70–99)
GLUCOSE SERPL-MCNC: 112 MG/DL — HIGH (ref 70–99)
GLUCOSE SERPL-MCNC: 133 MG/DL — HIGH (ref 70–99)
GLUCOSE UR-MCNC: NEGATIVE — SIGNIFICANT CHANGE UP
HCO3 BLDV-SCNC: 26 MMOL/L — SIGNIFICANT CHANGE UP (ref 20–27)
HCT VFR BLD CALC: 43.8 % — SIGNIFICANT CHANGE UP (ref 39–50)
HCT VFR BLD CALC: 46.2 % — SIGNIFICANT CHANGE UP (ref 39–50)
HCT VFR BLDV CALC: 46.2 % — SIGNIFICANT CHANGE UP (ref 39–51)
HGB BLD-MCNC: 14.1 G/DL — SIGNIFICANT CHANGE UP (ref 13–17)
HGB BLD-MCNC: 15.1 G/DL — SIGNIFICANT CHANGE UP (ref 13–17)
HGB BLDV-MCNC: 15.1 G/DL — SIGNIFICANT CHANGE UP (ref 13–17)
IMM GRANULOCYTES # BLD AUTO: 0.01 # — SIGNIFICANT CHANGE UP
IMM GRANULOCYTES NFR BLD AUTO: 0.1 % — SIGNIFICANT CHANGE UP (ref 0–1.5)
KETONES UR-MCNC: SIGNIFICANT CHANGE UP
LACTATE BLDV-MCNC: 2.9 MMOL/L — HIGH (ref 0.5–2)
LDH SERPL L TO P-CCNC: 141 U/L — SIGNIFICANT CHANGE UP (ref 135–225)
LEUKOCYTE ESTERASE UR-ACNC: NEGATIVE — SIGNIFICANT CHANGE UP
LIDOCAIN IGE QN: 18.4 U/L — SIGNIFICANT CHANGE UP (ref 7–60)
LYMPHOCYTES # BLD AUTO: 2.09 K/UL — SIGNIFICANT CHANGE UP (ref 1–3.3)
LYMPHOCYTES # BLD AUTO: 28 % — SIGNIFICANT CHANGE UP (ref 13–44)
MAGNESIUM SERPL-MCNC: 2 MG/DL — SIGNIFICANT CHANGE UP (ref 1.6–2.6)
MCHC RBC-ENTMCNC: 27.4 PG — SIGNIFICANT CHANGE UP (ref 27–34)
MCHC RBC-ENTMCNC: 27.9 PG — SIGNIFICANT CHANGE UP (ref 27–34)
MCHC RBC-ENTMCNC: 32.2 % — SIGNIFICANT CHANGE UP (ref 32–36)
MCHC RBC-ENTMCNC: 32.7 % — SIGNIFICANT CHANGE UP (ref 32–36)
MCV RBC AUTO: 85 FL — SIGNIFICANT CHANGE UP (ref 80–100)
MCV RBC AUTO: 85.4 FL — SIGNIFICANT CHANGE UP (ref 80–100)
MONOCYTES # BLD AUTO: 0.66 K/UL — SIGNIFICANT CHANGE UP (ref 0–0.9)
MONOCYTES NFR BLD AUTO: 8.8 % — SIGNIFICANT CHANGE UP (ref 2–14)
MUCOUS THREADS # UR AUTO: SIGNIFICANT CHANGE UP
NEUTROPHILS # BLD AUTO: 4.44 K/UL — SIGNIFICANT CHANGE UP (ref 1.8–7.4)
NEUTROPHILS NFR BLD AUTO: 59.5 % — SIGNIFICANT CHANGE UP (ref 43–77)
NITRITE UR-MCNC: NEGATIVE — SIGNIFICANT CHANGE UP
NRBC # FLD: 0 — SIGNIFICANT CHANGE UP
NRBC # FLD: 0 — SIGNIFICANT CHANGE UP
PCO2 BLDV: 36 MMHG — LOW (ref 41–51)
PH BLDV: 7.47 PH — HIGH (ref 7.32–7.43)
PH UR: 6.5 — SIGNIFICANT CHANGE UP (ref 4.6–8)
PHOSPHATE SERPL-MCNC: 4 MG/DL — SIGNIFICANT CHANGE UP (ref 2.5–4.5)
PLATELET # BLD AUTO: 185 K/UL — SIGNIFICANT CHANGE UP (ref 150–400)
PLATELET # BLD AUTO: 226 K/UL — SIGNIFICANT CHANGE UP (ref 150–400)
PMV BLD: 10 FL — SIGNIFICANT CHANGE UP (ref 7–13)
PMV BLD: 10.3 FL — SIGNIFICANT CHANGE UP (ref 7–13)
PO2 BLDV: 25 MMHG — LOW (ref 35–40)
POTASSIUM BLDV-SCNC: 8 MMOL/L — CRITICAL HIGH (ref 3.4–4.5)
POTASSIUM SERPL-MCNC: 3.9 MMOL/L — SIGNIFICANT CHANGE UP (ref 3.5–5.3)
POTASSIUM SERPL-MCNC: 5.2 MMOL/L — SIGNIFICANT CHANGE UP (ref 3.5–5.3)
POTASSIUM SERPL-SCNC: 3.9 MMOL/L — SIGNIFICANT CHANGE UP (ref 3.5–5.3)
POTASSIUM SERPL-SCNC: 5.2 MMOL/L — SIGNIFICANT CHANGE UP (ref 3.5–5.3)
PROT SERPL-MCNC: 7.8 G/DL — SIGNIFICANT CHANGE UP (ref 6–8.3)
PROT UR-MCNC: 30 — SIGNIFICANT CHANGE UP
RBC # BLD: 5.15 M/UL — SIGNIFICANT CHANGE UP (ref 4.2–5.8)
RBC # BLD: 5.41 M/UL — SIGNIFICANT CHANGE UP (ref 4.2–5.8)
RBC # FLD: 13.8 % — SIGNIFICANT CHANGE UP (ref 10.3–14.5)
RBC # FLD: 13.9 % — SIGNIFICANT CHANGE UP (ref 10.3–14.5)
RBC CASTS # UR COMP ASSIST: SIGNIFICANT CHANGE UP (ref 0–?)
SAO2 % BLDV: 46.1 % — LOW (ref 60–85)
SODIUM SERPL-SCNC: 137 MMOL/L — SIGNIFICANT CHANGE UP (ref 135–145)
SODIUM SERPL-SCNC: 139 MMOL/L — SIGNIFICANT CHANGE UP (ref 135–145)
SP GR SPEC: > 1.04 — HIGH (ref 1–1.03)
UROBILINOGEN FLD QL: NORMAL E.U. — SIGNIFICANT CHANGE UP (ref 0.1–0.2)
WBC # BLD: 10.46 K/UL — SIGNIFICANT CHANGE UP (ref 3.8–10.5)
WBC # BLD: 7.47 K/UL — SIGNIFICANT CHANGE UP (ref 3.8–10.5)
WBC # FLD AUTO: 10.46 K/UL — SIGNIFICANT CHANGE UP (ref 3.8–10.5)
WBC # FLD AUTO: 7.47 K/UL — SIGNIFICANT CHANGE UP (ref 3.8–10.5)
WBC UR QL: SIGNIFICANT CHANGE UP (ref 0–?)

## 2017-08-19 PROCEDURE — 74177 CT ABD & PELVIS W/CONTRAST: CPT | Mod: 26

## 2017-08-19 RX ORDER — MORPHINE SULFATE 50 MG/1
1 CAPSULE, EXTENDED RELEASE ORAL EVERY 6 HOURS
Qty: 0 | Refills: 0 | Status: DISCONTINUED | OUTPATIENT
Start: 2017-08-19 | End: 2017-08-20

## 2017-08-19 RX ORDER — MESALAMINE 400 MG
4 TABLET, DELAYED RELEASE (ENTERIC COATED) ORAL AT BEDTIME
Qty: 0 | Refills: 0 | Status: DISCONTINUED | OUTPATIENT
Start: 2017-08-19 | End: 2017-08-22

## 2017-08-19 RX ORDER — SODIUM CHLORIDE 9 MG/ML
1000 INJECTION, SOLUTION INTRAVENOUS ONCE
Qty: 0 | Refills: 0 | Status: COMPLETED | OUTPATIENT
Start: 2017-08-19 | End: 2017-08-19

## 2017-08-19 RX ORDER — FOLIC ACID 0.8 MG
1 TABLET ORAL DAILY
Qty: 0 | Refills: 0 | Status: DISCONTINUED | OUTPATIENT
Start: 2017-08-19 | End: 2017-08-22

## 2017-08-19 RX ORDER — MORPHINE SULFATE 50 MG/1
4 CAPSULE, EXTENDED RELEASE ORAL ONCE
Qty: 0 | Refills: 0 | Status: DISCONTINUED | OUTPATIENT
Start: 2017-08-19 | End: 2017-08-19

## 2017-08-19 RX ORDER — ACETAMINOPHEN 500 MG
650 TABLET ORAL EVERY 6 HOURS
Qty: 0 | Refills: 0 | Status: DISCONTINUED | OUTPATIENT
Start: 2017-08-19 | End: 2017-08-22

## 2017-08-19 RX ORDER — ONDANSETRON 8 MG/1
4 TABLET, FILM COATED ORAL ONCE
Qty: 0 | Refills: 0 | Status: COMPLETED | OUTPATIENT
Start: 2017-08-19 | End: 2017-08-19

## 2017-08-19 RX ORDER — MORPHINE SULFATE 50 MG/1
2 CAPSULE, EXTENDED RELEASE ORAL EVERY 4 HOURS
Qty: 0 | Refills: 0 | Status: DISCONTINUED | OUTPATIENT
Start: 2017-08-19 | End: 2017-08-20

## 2017-08-19 RX ORDER — PANTOPRAZOLE SODIUM 20 MG/1
40 TABLET, DELAYED RELEASE ORAL
Qty: 0 | Refills: 0 | Status: DISCONTINUED | OUTPATIENT
Start: 2017-08-19 | End: 2017-08-22

## 2017-08-19 RX ADMIN — MORPHINE SULFATE 4 MILLIGRAM(S): 50 CAPSULE, EXTENDED RELEASE ORAL at 02:28

## 2017-08-19 RX ADMIN — SODIUM CHLORIDE 2000 MILLILITER(S): 9 INJECTION, SOLUTION INTRAVENOUS at 08:15

## 2017-08-19 RX ADMIN — MORPHINE SULFATE 2 MILLIGRAM(S): 50 CAPSULE, EXTENDED RELEASE ORAL at 22:19

## 2017-08-19 RX ADMIN — MORPHINE SULFATE 2 MILLIGRAM(S): 50 CAPSULE, EXTENDED RELEASE ORAL at 13:09

## 2017-08-19 RX ADMIN — MORPHINE SULFATE 1 MILLIGRAM(S): 50 CAPSULE, EXTENDED RELEASE ORAL at 20:59

## 2017-08-19 RX ADMIN — Medication 4 GRAM(S): at 22:05

## 2017-08-19 RX ADMIN — Medication 1 MILLIGRAM(S): at 11:23

## 2017-08-19 RX ADMIN — MORPHINE SULFATE 4 MILLIGRAM(S): 50 CAPSULE, EXTENDED RELEASE ORAL at 06:00

## 2017-08-19 RX ADMIN — MORPHINE SULFATE 2 MILLIGRAM(S): 50 CAPSULE, EXTENDED RELEASE ORAL at 12:54

## 2017-08-19 RX ADMIN — MORPHINE SULFATE 2 MILLIGRAM(S): 50 CAPSULE, EXTENDED RELEASE ORAL at 17:20

## 2017-08-19 RX ADMIN — MORPHINE SULFATE 2 MILLIGRAM(S): 50 CAPSULE, EXTENDED RELEASE ORAL at 08:42

## 2017-08-19 RX ADMIN — MORPHINE SULFATE 1 MILLIGRAM(S): 50 CAPSULE, EXTENDED RELEASE ORAL at 20:44

## 2017-08-19 RX ADMIN — ONDANSETRON 4 MILLIGRAM(S): 8 TABLET, FILM COATED ORAL at 02:28

## 2017-08-19 RX ADMIN — MORPHINE SULFATE 1 MILLIGRAM(S): 50 CAPSULE, EXTENDED RELEASE ORAL at 11:38

## 2017-08-19 RX ADMIN — MORPHINE SULFATE 2 MILLIGRAM(S): 50 CAPSULE, EXTENDED RELEASE ORAL at 22:04

## 2017-08-19 RX ADMIN — MORPHINE SULFATE 2 MILLIGRAM(S): 50 CAPSULE, EXTENDED RELEASE ORAL at 08:27

## 2017-08-19 RX ADMIN — MORPHINE SULFATE 4 MILLIGRAM(S): 50 CAPSULE, EXTENDED RELEASE ORAL at 05:42

## 2017-08-19 RX ADMIN — MORPHINE SULFATE 4 MILLIGRAM(S): 50 CAPSULE, EXTENDED RELEASE ORAL at 02:45

## 2017-08-19 RX ADMIN — Medication 125 MILLIGRAM(S): at 11:23

## 2017-08-19 RX ADMIN — MORPHINE SULFATE 2 MILLIGRAM(S): 50 CAPSULE, EXTENDED RELEASE ORAL at 17:35

## 2017-08-19 RX ADMIN — MORPHINE SULFATE 1 MILLIGRAM(S): 50 CAPSULE, EXTENDED RELEASE ORAL at 11:23

## 2017-08-19 NOTE — ED PROVIDER NOTE - MEDICAL DECISION MAKING DETAILS
37 yo M 37 yo M with history of Ulcerative colitis presenting with abdominal pain nausea and vomiting x 5 days worsening today. PT had last bout in May. Pt diagnosed with indeterminate colitis at the [facility], currently on immunosuppressants medication. Complains of constipation which is similar to prior flarres. Pt with tenderness in entire abdomen. Will obtain ct a/p labs, pain meds. likely admit

## 2017-08-19 NOTE — CONSULT NOTE ADULT - ASSESSMENT
Impression:  1) Abdominal pain, nausea/vomiting: differential includes IBD flare, constipation, infection.  2) Ulcerative colitis on Apriso and Canasa   3) Abnormal CT scan - CT suggestive of "acute ulcerative colitis flare of the distal rectosigmoid colon" which does not correlate to location of abdominal pain nor is the patient experiencing tenesmus, hematochezia, or diarrhea.     Plan:  - send C diff, stool culture, stool O/P, ESR, CRP, fecal calprotectin  - pending above, may need EGD +/- colonoscopy  - pain control, IVF per primary  - continue UC medications   - diet as tolerated Impression:  1) Abdominal pain, nausea/vomiting: differential includes IBD flare, constipation, infection.  2) Ulcerative colitis on Apriso and Canasa   3) Abnormal CT scan - CT suggestive of "acute ulcerative colitis flare of the distal rectosigmoid colon" which does not correlate to location of abdominal pain nor is the patient experiencing tenesmus, hematochezia, or diarrhea.     Plan:  - send C diff, stool culture, stool O/P, ESR, CRP, fecal calprotectin  - given predominantly upper GI symptoms, will plan for EGD Monday to determine etiology of abdominal pain and for biopsies to rule out upper GI involvement of IBD (Crohn's disease)   - pain control, IVF per primary  - continue UC medications   - diet as tolerated  - patient to bring in colonoscopy report from 2017 Impression:  1) Abdominal pain, nausea/vomiting: differential includes IBD flare, constipation, gastroenteritis  2) Ulcerative colitis on Apriso and Canasa.  Per patient, previously diagnosed with "indeterminate colitis"  3) Abnormal CT scan - CT suggestive of "acute ulcerative colitis flare of the distal rectosigmoid colon" which does not correlate to location of abdominal pain nor is the patient experiencing tenesmus, hematochezia, or diarrhea.   4) Constipation    Plan:  - send C diff, stool culture, stool O/P, ESR, CRP, fecal calprotectin if the patient has diarrhea  - given predominantly upper GI symptoms, will plan for EGD Monday to determine etiology of abdominal pain and for biopsies to rule out upper GI involvement of IBD (Crohn's disease).  Will consider flexible sigmoidoscopy as well to assess colitis seen on CT.  - pain control, IVF per primary  - continue UC medications   - diet as tolerated, NPO past midnight  - patient to bring in colonoscopy report from 2017

## 2017-08-19 NOTE — CONSULT NOTE ADULT - ATTENDING COMMENTS
I have seen and evaluated the patient with the GI Fellow and GI Team.  I agree with the findings, formulation and plan of care as documented in the resident / fellows note and edited where appropriate.

## 2017-08-19 NOTE — ED PROVIDER NOTE - OBJECTIVE STATEMENT
37 yo M with history of Ulcerative colitis presenting with abdominal pain nausea and vomiting x 5 days worsening today. PT had last bout in May. Pt diagnosed with indetermined colitis at the [facility], currently on immunosuprressant medication. COmplains of constipation. Denies fevers, cough, rhinorrhea, otorrhea, otalgia, nausea, vomiting, constipation, diarrhea, chest pain, shortness of breath or changes in urinary habits. Complains of chills. 37 yo M with history of Ulcerative colitis presenting with abdominal pain nausea and vomiting x 5 days worsening today. PT had last bout in May. Pt diagnosed with indeterminate colitis at the [facility], currently on immunosuppressants medication. Complains of constipation. Denies fevers, cough, rhinorrhea, otorrhea, otalgia, nausea, vomiting, constipation, diarrhea, chest pain, shortness of breath or changes in urinary habits. Complains of chills.

## 2017-08-19 NOTE — H&P ADULT - ASSESSMENT
37 yo M with history of Ulcerative colonoscopyitis presenting with abdominal pain nausea and vomiting x 5 days worsening today. Possible flare up of colitis

## 2017-08-19 NOTE — ED PROVIDER NOTE - ATTENDING CONTRIBUTION TO CARE
AJM: Pt seen with resident and agree with above note. 35 yo M with history of Ulcerative colitis presenting with abdominal pain nausea and vomiting x 5 days worsening today. PT had last bout in May. Pt diagnosed with indeterminate colitis at the [facility], currently on immunosuppressants medication. Complains of constipation which is similar to prior flarres. Pt with tenderness in entire abdomen. Will obtain ct a/p labs, pain meds. likely admit

## 2017-08-19 NOTE — H&P ADULT - HISTORY OF PRESENT ILLNESS
35 yo M with history of Ulcerative colitis presenting with abdominal pain nausea and vomiting x 5 days worsening today. PT had last bout in May. He states that he went to [facility] last year where they diagnosed him with indeterminate colitis on the basis of liver and pancreatic abnormalities. Was started on plecanetide orally yesterday but states it gave him diarrhea., usually has constipation. Denies fevers, cough, rhinorrhea, otorrhea, otalgia, nausea, vomiting, chest pain, shortness of breath or changes in urinary habits. Complains of chills on and off.

## 2017-08-19 NOTE — ED ADULT NURSE NOTE - ED STAT RN HANDOFF DETAILS
endorsed to rn etienne. pt a&o x3, no c/o pain. vss. as per nick davila, pt to get fluids and labwork after fluids. awaiting fluids order but endorsed to rn etienne. pt in nad. safety maintained

## 2017-08-19 NOTE — H&P ADULT - NSHPREVIEWOFSYSTEMS_GEN_ALL_CORE
Gen: no loss of wt or appetite  ENT: no dizziness or hearing loss  Ophth: no blurring of vision or loss of vision  Resp: No cough or sputum production  CVS: No CP or palpitations or orthopnea  GI: + N and V + diarrhea yesterday + abdominal pain generalized  : no dysuria, hematuria  Endo: no polyuria or excessive sweating  Neuro: no weakness or paresthesias  Psych: No suicidal or depressive ideation  Heme: No petechiae or easy bruising  Msk: No joint pain or swelling  All other ROS negative

## 2017-08-19 NOTE — ED ADULT TRIAGE NOTE - CHIEF COMPLAINT QUOTE
Pt walk in both upper Abdominal pain since yesterday with Nausea vomiting x5 constipation for 3 days unable to tolerate PO intake since yesterday. PMHx of Indetermined Colitis

## 2017-08-19 NOTE — H&P ADULT - PROBLEM SELECTOR PLAN 1
GI evaluation called  patient's belly is very benign, doubt any significant pathology  will monitor   morphine PRN  continue Canasa  patient states uses every night

## 2017-08-19 NOTE — ED ADULT NURSE NOTE - OBJECTIVE STATEMENT
pt a&o x3 presents c/o abdominal pain x 1 day reporting hx of indetermined colitis. also endorsed n/v/d and constipation. pt appears uncomfortable.  denies chest pain. right arm 20g placed. labs drawn and sent. md assessed. vss. will monitor

## 2017-08-19 NOTE — H&P ADULT - FAMILY HISTORY
<<-----Click on this checkbox to enter Family History Family history of hypertension, father     Grandparent  Still living? Unknown  Family history of diabetes mellitus, Age at diagnosis: Age Unknown  Family history of cerebrovascular accident, Age at diagnosis: Age Unknown     Uncle  Still living? Unknown  Family history of diabetes mellitus, Age at diagnosis: Age Unknown

## 2017-08-19 NOTE — H&P ADULT - NSHPPHYSICALEXAM_GEN_ALL_CORE
vital signs as above  in no apparent distress  HEENT: GENTRY EOMI  Neck: Supple, no JVD, no thyromegaly  Lungs: clear, no rhonchi, no wheeze, no crackles  CVS: S1 S2 no M/R/G  Abdomen: minimal tenderness on deep palpation, no organomegaly, BS present  Neuro: AO x 3 no focal weakness, no sensory abnormalities  Psych: appropriate affect  Skin: warm, dry  Ext: no edema, no clubbing  Msk: no joint swelling or deformities  Back: no CVA tenderness, no kyphosis/scoliosis

## 2017-08-19 NOTE — CONSULT NOTE ADULT - SUBJECTIVE AND OBJECTIVE BOX
GASTROENTEROLOGY INITIAL CONSULT NOTE    Chief Complaint:  Patient is a 36y old  Male who presents with a chief complaint of abdominal pain (19 Aug 2017 10:37)      HPI: 36y male with past medical history ulcerative colitis (currently managed by Dr. Farr, on Apriso and Canasa) presenting with abdominal pain. Patient reports progressive abdominal pain over the last 3 days that is intermittent, non-radiating, primarily in the epigastrum. No specific alleviating or exacerbating factors. Associated with nausea and one episode of bilious emesis. No melena, hematochezia, fevers, or chills. Patient was feeling constipated over the preceding few days and had a BM yesterday (loose, brown) with some improvement in abdominal pain. Patient reports he typically has a BM every 2-3 days. No recent travel or sick contacts. Last colonoscopy about 1 year ago by Dr. Farr; EGD 2015. Patient was admitted to [facility] in May 2017 with UC flare, underwent flex sig showing rectosigmoid ulcerations.     Allergies:  [facility] (Hives)      [facility] Medications:  acetaminophen   Tablet 650 milliGRAM(s) Oral every 6 hours PRN  morphine  - Injectable 2 milliGRAM(s) IV Push every 4 hours PRN  morphine  - Injectable 1 milliGRAM(s) IV Push every 6 hours PRN  mesalamine Enema 4 Gram(s) Rectal at bedtime  pantoprazole    Tablet 40 milliGRAM(s) Oral before breakfast  folic acid 1 milliGRAM(s) Oral daily      PMHX/PSHX:  Polyp of colon, unspecified part of colon, unspecified type  Condyloma  Herpes simplex type 2 infection  Herpes labialis  Ulcerative colitis  History of tracheostomy as a child  S/P knee surgery      Family history:  Family history of hypertension  Family history of cerebrovascular accident (Grandparent)  Family history of diabetes mellitus (Grandparent, Uncle)  No pertinent family history in first degree relatives  Family history of hypertension      Social History:     ROS:     General:  No wt loss, fevers, chills, night sweats, fatigue,   Eyes:  Good vision, no reported pain  ENT:  No sore throat, pain, runny nose, dysphagia  CV:  No pain, palpitations, hypo/hypertension  Resp:  No dyspnea, cough, tachypnea, wheezing  GI:  see HPI  :  No pain, bleeding, incontinence, nocturia  Muscle:  No pain, weakness  Neuro:  No weakness, tingling, memory problems  Psych:  No fatigue, insomnia, mood problems, depression  Endocrine:  No polyuria, polydipsia, cold/heat intolerance  Heme:  No petechiae, ecchymosis, easy bruisability  Skin:  No rash, tattoos, scars, edema      PHYSICAL EXAM:   Vital Signs:  Vital Signs Last 24 Hrs  T(C): 36.6 (19 Aug 2017 12:53), Max: 36.8 (19 Aug 2017 05:34)  T(F): 97.9 (19 Aug 2017 12:53), Max: 98.2 (19 Aug 2017 05:34)  HR: 56 (19 Aug 2017 12:53) (55 - 66)  BP: 110/71 (19 Aug 2017 12:53) (110/71 - 133/69)  BP(mean): --  RR: 18 (19 Aug 2017 12:53) (17 - 20)  SpO2: 100% (19 Aug 2017 12:53) (100% - 100%)  Daily Height in cm: 180.34 (19 Aug 2017 00:20)    Daily     GENERAL:  no acute distress  HEENT:  -icterus   CHEST:  clear bilaterally, no wheezes or rales  HEART:  RRR, S1S2  ABDOMEN:  soft, non-tender, non-distended, normoactive bowel sounds,  no hepato-splenomegaly  EXTEREMITIES:  no  edema  SKIN:  No rash/erythema/ecchymoses/petechiae/wounds/abscess/warm/dry  NEURO:  alert, oriented, conversant     LABS:                        14.1   7.47  )-----------( 185      ( 19 Aug 2017 11:07 )             43.8     08-19    139  |  100  |  5<L>  ----------------------------<  112<H>  3.9   |  26  |  0.75    Ca    9.5      19 Aug 2017 11:07  Phos  4.0     08-19  Mg     2.0     08-19    TPro  7.8  /  Alb  4.5  /  TBili  0.3  /  DBili  x   /  AST  35  /  ALT  15  /  AlkPhos  33<L>  08-19    LIVER FUNCTIONS - ( 19 Aug 2017 02:00 )  Alb: 4.5 g/dL / Pro: 7.8 g/dL / ALK PHOS: 33 u/L / ALT: 15 u/L / AST: 35 u/L / GGT: x             Amylase Serum--      Lipase serum18.4       Ammonia--      Imaging:

## 2017-08-20 DIAGNOSIS — K51.30 ULCERATIVE (CHRONIC) RECTOSIGMOIDITIS WITHOUT COMPLICATIONS: ICD-10-CM

## 2017-08-20 DIAGNOSIS — R10.13 EPIGASTRIC PAIN: ICD-10-CM

## 2017-08-20 LAB
BUN SERPL-MCNC: 6 MG/DL — LOW (ref 7–23)
CALCIUM SERPL-MCNC: 9.1 MG/DL — SIGNIFICANT CHANGE UP (ref 8.4–10.5)
CHLORIDE SERPL-SCNC: 98 MMOL/L — SIGNIFICANT CHANGE UP (ref 98–107)
CO2 SERPL-SCNC: 24 MMOL/L — SIGNIFICANT CHANGE UP (ref 22–31)
CREAT SERPL-MCNC: 0.71 MG/DL — SIGNIFICANT CHANGE UP (ref 0.5–1.3)
CRP SERPL-MCNC: 1.7 MG/L — SIGNIFICANT CHANGE UP
ERYTHROCYTE [SEDIMENTATION RATE] IN BLOOD: 5 MM/HR — SIGNIFICANT CHANGE UP (ref 1–15)
GLUCOSE SERPL-MCNC: 119 MG/DL — HIGH (ref 70–99)
HCT VFR BLD CALC: 43.6 % — SIGNIFICANT CHANGE UP (ref 39–50)
HGB BLD-MCNC: 14.2 G/DL — SIGNIFICANT CHANGE UP (ref 13–17)
MAGNESIUM SERPL-MCNC: 1.8 MG/DL — SIGNIFICANT CHANGE UP (ref 1.6–2.6)
MCHC RBC-ENTMCNC: 27.6 PG — SIGNIFICANT CHANGE UP (ref 27–34)
MCHC RBC-ENTMCNC: 32.6 % — SIGNIFICANT CHANGE UP (ref 32–36)
MCV RBC AUTO: 84.8 FL — SIGNIFICANT CHANGE UP (ref 80–100)
NRBC # FLD: 0 — SIGNIFICANT CHANGE UP
PLATELET # BLD AUTO: 234 K/UL — SIGNIFICANT CHANGE UP (ref 150–400)
PMV BLD: 10.2 FL — SIGNIFICANT CHANGE UP (ref 7–13)
POTASSIUM SERPL-MCNC: 3.5 MMOL/L — SIGNIFICANT CHANGE UP (ref 3.5–5.3)
POTASSIUM SERPL-SCNC: 3.5 MMOL/L — SIGNIFICANT CHANGE UP (ref 3.5–5.3)
RBC # BLD: 5.14 M/UL — SIGNIFICANT CHANGE UP (ref 4.2–5.8)
RBC # FLD: 13.5 % — SIGNIFICANT CHANGE UP (ref 10.3–14.5)
SODIUM SERPL-SCNC: 138 MMOL/L — SIGNIFICANT CHANGE UP (ref 135–145)
WBC # BLD: 9.04 K/UL — SIGNIFICANT CHANGE UP (ref 3.8–10.5)
WBC # FLD AUTO: 9.04 K/UL — SIGNIFICANT CHANGE UP (ref 3.8–10.5)

## 2017-08-20 PROCEDURE — 99222 1ST HOSP IP/OBS MODERATE 55: CPT | Mod: GC

## 2017-08-20 RX ORDER — SENNA PLUS 8.6 MG/1
2 TABLET ORAL AT BEDTIME
Qty: 0 | Refills: 0 | Status: DISCONTINUED | OUTPATIENT
Start: 2017-08-20 | End: 2017-08-22

## 2017-08-20 RX ORDER — ACETAMINOPHEN 500 MG
650 TABLET ORAL EVERY 6 HOURS
Qty: 0 | Refills: 0 | Status: DISCONTINUED | OUTPATIENT
Start: 2017-08-20 | End: 2017-08-22

## 2017-08-20 RX ORDER — MORPHINE SULFATE 50 MG/1
1 CAPSULE, EXTENDED RELEASE ORAL EVERY 4 HOURS
Qty: 0 | Refills: 0 | Status: DISCONTINUED | OUTPATIENT
Start: 2017-08-20 | End: 2017-08-22

## 2017-08-20 RX ORDER — DOCUSATE SODIUM 100 MG
100 CAPSULE ORAL
Qty: 0 | Refills: 0 | Status: DISCONTINUED | OUTPATIENT
Start: 2017-08-20 | End: 2017-08-22

## 2017-08-20 RX ORDER — OXYCODONE HYDROCHLORIDE 5 MG/1
5 TABLET ORAL EVERY 6 HOURS
Qty: 0 | Refills: 0 | Status: DISCONTINUED | OUTPATIENT
Start: 2017-08-20 | End: 2017-08-22

## 2017-08-20 RX ORDER — ENOXAPARIN SODIUM 100 MG/ML
40 INJECTION SUBCUTANEOUS DAILY
Qty: 0 | Refills: 0 | Status: DISCONTINUED | OUTPATIENT
Start: 2017-08-20 | End: 2017-08-22

## 2017-08-20 RX ADMIN — MORPHINE SULFATE 2 MILLIGRAM(S): 50 CAPSULE, EXTENDED RELEASE ORAL at 02:43

## 2017-08-20 RX ADMIN — PANTOPRAZOLE SODIUM 40 MILLIGRAM(S): 20 TABLET, DELAYED RELEASE ORAL at 05:51

## 2017-08-20 RX ADMIN — MORPHINE SULFATE 1 MILLIGRAM(S): 50 CAPSULE, EXTENDED RELEASE ORAL at 10:15

## 2017-08-20 RX ADMIN — MORPHINE SULFATE 1 MILLIGRAM(S): 50 CAPSULE, EXTENDED RELEASE ORAL at 15:00

## 2017-08-20 RX ADMIN — MORPHINE SULFATE 1 MILLIGRAM(S): 50 CAPSULE, EXTENDED RELEASE ORAL at 14:43

## 2017-08-20 RX ADMIN — Medication 1 MILLIGRAM(S): at 12:33

## 2017-08-20 RX ADMIN — MORPHINE SULFATE 1 MILLIGRAM(S): 50 CAPSULE, EXTENDED RELEASE ORAL at 06:02

## 2017-08-20 RX ADMIN — MORPHINE SULFATE 1 MILLIGRAM(S): 50 CAPSULE, EXTENDED RELEASE ORAL at 18:56

## 2017-08-20 RX ADMIN — MORPHINE SULFATE 1 MILLIGRAM(S): 50 CAPSULE, EXTENDED RELEASE ORAL at 19:11

## 2017-08-20 RX ADMIN — MORPHINE SULFATE 1 MILLIGRAM(S): 50 CAPSULE, EXTENDED RELEASE ORAL at 09:57

## 2017-08-20 RX ADMIN — OXYCODONE HYDROCHLORIDE 5 MILLIGRAM(S): 5 TABLET ORAL at 20:46

## 2017-08-20 RX ADMIN — OXYCODONE HYDROCHLORIDE 5 MILLIGRAM(S): 5 TABLET ORAL at 21:45

## 2017-08-20 RX ADMIN — Medication 4 GRAM(S): at 22:09

## 2017-08-20 RX ADMIN — MORPHINE SULFATE 2 MILLIGRAM(S): 50 CAPSULE, EXTENDED RELEASE ORAL at 02:58

## 2017-08-20 RX ADMIN — MORPHINE SULFATE 1 MILLIGRAM(S): 50 CAPSULE, EXTENDED RELEASE ORAL at 05:47

## 2017-08-20 NOTE — PROGRESS NOTE ADULT - SUBJECTIVE AND OBJECTIVE BOX
INTERVAL HPI/OVERNIGHT EVENTS:still pain in epigastrium   Vital Signs Last 24 Hrs  T(C): 36.9 (20 Aug 2017 13:37), Max: 36.9 (20 Aug 2017 13:37)  T(F): 98.5 (20 Aug 2017 13:37), Max: 98.5 (20 Aug 2017 13:37)  HR: 57 (20 Aug 2017 13:37) (57 - 61)  BP: 119/75 (20 Aug 2017 13:37) (119/75 - 123/77)  BP(mean): --  RR: 18 (20 Aug 2017 13:37) (18 - 18)  SpO2: 98% (20 Aug 2017 13:37) (98% - 100%)  I&O's Summary    MEDICATIONS  (STANDING):  mesalamine Enema 4 Gram(s) Rectal at bedtime  pantoprazole    Tablet 40 milliGRAM(s) Oral before breakfast  folic acid 1 milliGRAM(s) Oral daily    MEDICATIONS  (PRN):  acetaminophen   Tablet 650 milliGRAM(s) Oral every 6 hours PRN For Temp greater than 38 C (100.4 F)  acetaminophen   Tablet. 650 milliGRAM(s) Oral every 6 hours PRN Mild Pain (1 - 3)  oxyCODONE    IR 5 milliGRAM(s) Oral every 6 hours PRN Moderate Pain (4 - 6)  morphine  - Injectable 1 milliGRAM(s) IV Push every 4 hours PRN Severe Pain (7 - 10)  docusate sodium 100 milliGRAM(s) Oral two times a day PRN Constipation  senna 2 Tablet(s) Oral at bedtime PRN Constipation    LABS:                        14.2   9.04  )-----------( 234      ( 20 Aug 2017 06:00 )             43.6     08-20    138  |  98  |  6<L>  ----------------------------<  119<H>  3.5   |  24  |  0.71    Ca    9.1      20 Aug 2017 06:00  Phos  4.0     08-19  Mg     1.8     08-20    TPro  7.8  /  Alb  4.5  /  TBili  0.3  /  DBili  x   /  AST  35  /  ALT  15  /  AlkPhos  33<L>  08-19      Urinalysis Basic - ( 19 Aug 2017 11:00 )    Color: YELLOW / Appearance: CLEAR / SG: > 1.040 / pH: 6.5  Gluc: NEGATIVE / Ketone: MODERATE  / Bili: NEGATIVE / Urobili: NORMAL E.U.   Blood: NEGATIVE / Protein: 30 / Nitrite: NEGATIVE   Leuk Esterase: NEGATIVE / RBC: 0-2 / WBC 0-2   Sq Epi: x / Non Sq Epi: x / Bacteria: x      CAPILLARY BLOOD GLUCOSE            Urinalysis Basic - ( 19 Aug 2017 11:00 )    Color: YELLOW / Appearance: CLEAR / SG: > 1.040 / pH: 6.5  Gluc: NEGATIVE / Ketone: MODERATE  / Bili: NEGATIVE / Urobili: NORMAL E.U.   Blood: NEGATIVE / Protein: 30 / Nitrite: NEGATIVE   Leuk Esterase: NEGATIVE / RBC: 0-2 / WBC 0-2   Sq Epi: x / Non Sq Epi: x / Bacteria: x      REVIEW OF SYSTEMS:  CONSTITUTIONAL: No fever, weight loss, or fatigue  EYES: No eye pain, visual disturbances, or discharge  ENMT:  No difficulty hearing, tinnitus, vertigo; No sinus or throat pain  NECK: No pain or stiffness  BREASTS: No pain, masses, or nipple discharge  RESPIRATORY: No cough, wheezing, chills or hemoptysis; No shortness of breath  CARDIOVASCULAR: No chest pain, palpitations, dizziness, or leg swelling  GASTROINTESTINAL: No abdominal or epigastric pain. No nausea, vomiting, or hematemesis; No diarrhea or constipation. No melena or hematochezia.  GENITOURINARY: No dysuria, frequency, hematuria, or incontinence  NEUROLOGICAL: No headaches, memory loss, loss of strength, numbness, or tremors  SKIN: No itching, burning, rashes, or lesions   LYMPH NODES: No enlarged glands  ENDOCRINE: No heat or cold intolerance; No hair loss  MUSCULOSKELETAL: No joint pain or swelling; No muscle, back, or extremity pain  PSYCHIATRIC: No depression, anxiety, mood swings, or difficulty sleeping  HEME/LYMPH: No easy bruising, or bleeding gums  ALLERY AND IMMUNOLOGIC: No hives or eczema    RADIOLOGY & ADDITIONAL TESTS:    Consultant(s) Notes Reviewed:  [x ] YES  [ ] NO    PHYSICAL EXAM:  GENERAL: NAD, well-groomed, well-developed,not in any distress ,  HEAD:  Atraumatic, Normocephalic  EYES: EOMI, PERRLA, conjunctiva and sclera clear  ENMT: No tonsillar erythema, exudates, or enlargement; Moist mucous membranes, Good dentition, No lesions  NECK: Supple, No JVD, Normal thyroid  NERVOUS SYSTEM:  Alert & Oriented X3, No focal deficit   CHEST/LUNG: Good air entry bilateral with no  rales, rhonchi, wheezing, or rubs  HEART: Regular rate and rhythm; No murmurs, rubs, or gallops  ABDOMEN: Soft,tenderness epigastrium , Nondistended; Bowel sounds present  EXTREMITIES:  2+ Peripheral Pulses, No clubbing, cyanosis, or edema  SKIN: No rashes or lesions    Care Discussed with Consultants/Other Providers [ x] YES  [ ] NO

## 2017-08-21 ENCOUNTER — TRANSCRIPTION ENCOUNTER (OUTPATIENT)
Age: 37
End: 2017-08-21

## 2017-08-21 ENCOUNTER — RESULT REVIEW (OUTPATIENT)
Age: 37
End: 2017-08-21

## 2017-08-21 LAB
BUN SERPL-MCNC: 6 MG/DL — LOW (ref 7–23)
CALCIUM SERPL-MCNC: 9.4 MG/DL — SIGNIFICANT CHANGE UP (ref 8.4–10.5)
CHLORIDE SERPL-SCNC: 100 MMOL/L — SIGNIFICANT CHANGE UP (ref 98–107)
CO2 SERPL-SCNC: 25 MMOL/L — SIGNIFICANT CHANGE UP (ref 22–31)
CREAT SERPL-MCNC: 0.83 MG/DL — SIGNIFICANT CHANGE UP (ref 0.5–1.3)
GLUCOSE SERPL-MCNC: 87 MG/DL — SIGNIFICANT CHANGE UP (ref 70–99)
HCT VFR BLD CALC: 45.2 % — SIGNIFICANT CHANGE UP (ref 39–50)
HGB BLD-MCNC: 14.5 G/DL — SIGNIFICANT CHANGE UP (ref 13–17)
MAGNESIUM SERPL-MCNC: 1.7 MG/DL — SIGNIFICANT CHANGE UP (ref 1.6–2.6)
MCHC RBC-ENTMCNC: 27.5 PG — SIGNIFICANT CHANGE UP (ref 27–34)
MCHC RBC-ENTMCNC: 32.1 % — SIGNIFICANT CHANGE UP (ref 32–36)
MCV RBC AUTO: 85.8 FL — SIGNIFICANT CHANGE UP (ref 80–100)
NRBC # FLD: 0 — SIGNIFICANT CHANGE UP
PHOSPHATE SERPL-MCNC: 3.8 MG/DL — SIGNIFICANT CHANGE UP (ref 2.5–4.5)
PLATELET # BLD AUTO: 221 K/UL — SIGNIFICANT CHANGE UP (ref 150–400)
PMV BLD: 10.5 FL — SIGNIFICANT CHANGE UP (ref 7–13)
POTASSIUM SERPL-MCNC: 3.8 MMOL/L — SIGNIFICANT CHANGE UP (ref 3.5–5.3)
POTASSIUM SERPL-SCNC: 3.8 MMOL/L — SIGNIFICANT CHANGE UP (ref 3.5–5.3)
RBC # BLD: 5.27 M/UL — SIGNIFICANT CHANGE UP (ref 4.2–5.8)
RBC # FLD: 13.7 % — SIGNIFICANT CHANGE UP (ref 10.3–14.5)
SODIUM SERPL-SCNC: 140 MMOL/L — SIGNIFICANT CHANGE UP (ref 135–145)
WBC # BLD: 6.59 K/UL — SIGNIFICANT CHANGE UP (ref 3.8–10.5)
WBC # FLD AUTO: 6.59 K/UL — SIGNIFICANT CHANGE UP (ref 3.8–10.5)

## 2017-08-21 PROCEDURE — 43235 EGD DIAGNOSTIC BRUSH WASH: CPT | Mod: 59,GC

## 2017-08-21 PROCEDURE — 45380 COLONOSCOPY AND BIOPSY: CPT | Mod: 59,GC

## 2017-08-21 PROCEDURE — 88305 TISSUE EXAM BY PATHOLOGIST: CPT | Mod: 26

## 2017-08-21 RX ORDER — MESALAMINE 400 MG
1000 TABLET, DELAYED RELEASE (ENTERIC COATED) ORAL
Qty: 0 | Refills: 0 | Status: DISCONTINUED | OUTPATIENT
Start: 2017-08-21 | End: 2017-08-22

## 2017-08-21 RX ORDER — PLECANATIDE 3 MG/1
3 TABLET ORAL
Qty: 0 | Refills: 0 | COMMUNITY

## 2017-08-21 RX ORDER — MESALAMINE 400 MG
2 TABLET, DELAYED RELEASE (ENTERIC COATED) ORAL
Qty: 240 | Refills: 0 | OUTPATIENT
Start: 2017-08-21 | End: 2017-09-20

## 2017-08-21 RX ORDER — MESALAMINE 400 MG
1 TABLET, DELAYED RELEASE (ENTERIC COATED) ORAL
Qty: 0 | Refills: 0 | COMMUNITY

## 2017-08-21 RX ADMIN — Medication 1 MILLIGRAM(S): at 11:03

## 2017-08-21 RX ADMIN — OXYCODONE HYDROCHLORIDE 5 MILLIGRAM(S): 5 TABLET ORAL at 18:37

## 2017-08-21 RX ADMIN — OXYCODONE HYDROCHLORIDE 5 MILLIGRAM(S): 5 TABLET ORAL at 10:23

## 2017-08-21 RX ADMIN — MORPHINE SULFATE 1 MILLIGRAM(S): 50 CAPSULE, EXTENDED RELEASE ORAL at 21:45

## 2017-08-21 RX ADMIN — OXYCODONE HYDROCHLORIDE 5 MILLIGRAM(S): 5 TABLET ORAL at 05:55

## 2017-08-21 RX ADMIN — Medication 4 GRAM(S): at 21:33

## 2017-08-21 RX ADMIN — OXYCODONE HYDROCHLORIDE 5 MILLIGRAM(S): 5 TABLET ORAL at 19:37

## 2017-08-21 RX ADMIN — MORPHINE SULFATE 1 MILLIGRAM(S): 50 CAPSULE, EXTENDED RELEASE ORAL at 01:31

## 2017-08-21 RX ADMIN — OXYCODONE HYDROCHLORIDE 5 MILLIGRAM(S): 5 TABLET ORAL at 10:48

## 2017-08-21 RX ADMIN — PANTOPRAZOLE SODIUM 40 MILLIGRAM(S): 20 TABLET, DELAYED RELEASE ORAL at 05:56

## 2017-08-21 RX ADMIN — OXYCODONE HYDROCHLORIDE 5 MILLIGRAM(S): 5 TABLET ORAL at 06:55

## 2017-08-21 RX ADMIN — Medication 1000 MILLIGRAM(S): at 21:31

## 2017-08-21 RX ADMIN — MORPHINE SULFATE 1 MILLIGRAM(S): 50 CAPSULE, EXTENDED RELEASE ORAL at 21:30

## 2017-08-21 RX ADMIN — MORPHINE SULFATE 1 MILLIGRAM(S): 50 CAPSULE, EXTENDED RELEASE ORAL at 01:46

## 2017-08-21 NOTE — DISCHARGE NOTE ADULT - PATIENT PORTAL LINK FT
“You can access the [employer] Patient Portal, offered by [facility], by registering with the following website: [email or web address]”

## 2017-08-21 NOTE — DISCHARGE NOTE ADULT - MEDICATION SUMMARY - MEDICATIONS TO STOP TAKING
I will STOP taking the medications listed below when I get home from the hospital:    predniSONE 20 mg oral tablet  -- 2 tab(s) by mouth once a day    Apriso 0.375 g oral capsule, extended release  -- 1 cap(s) by mouth once a day    plecanatide  -- 3 milligram(s) by mouth once a day

## 2017-08-21 NOTE — DISCHARGE NOTE ADULT - HOSPITAL COURSE
35 y/o male with a PMHx of ulcerative colitis presents to ED with abdominal pain, nausea and vomiting,     + Acute ulcerative colitis flare of the distal rectosigmoid colon- GI following (house), regular diet, pain control  8/19 Medicine note (from H&P): Ulcerative colitis. GI evaluation called. Patient's belly is very benign, doubt any significant pathology. Will monitor. Morphine PRN. Continue Canasa. Patient states uses every night.  8/19 GI consult (house): Send C. diff, stool cultures, O&P, ESR, CRP, fecal calprotectin if the patient has diarrhea. Given predominantly upper GI symptoms, will plan for EGD Monday to determine etiology of abdominal pain and for biopsies to rule out upper GI involvement of IBD (Crohn's disease). Will consider flexible sigmoidoscopy as well to assess colitis seen on CT. Pain control. IVF per primary. Continue UC medications. Diet as tolerated. NPO after midnight. Patient to bring in colonoscopy report from 2017.  8/19 CT abdomen/pelvis: Acute ulcerative colitis flare of the distal rectosigmoid colon.    8/20 Medicine note: Epigastric abdominal pain. Awaiting EGD. Ulcerative rectosigmoiditis without complication. Continue home meds. GI appreciated.    EGD: Esophagogastric landmarks identified.                       - 2 cm hiatus hernia.                       - Z-line irregular, 36 cm from the incisors.                       - Normal stomach.                       - Normal examined duodenum.                       - No specimens collected.                       - No etiology for abdominal pain, nausea or vomiting                        found on this exam.  Colonscopy:- Mild Inflammation was found from the rectum to the                        transverse colon. Biopsied.                       - Non-bleeding internal hemorrhoids.  Recommendation:      - Return patient to hospital mai for ongoing care.                       - Low residue diet.                       - Await pathology results.                       - Continue Rowasa enemas 1 per rectum daily (qhs).                       - Start Mesalamine PO 1gram QID.     Discussed with MD - pt stable for discharge home, pt with no complaints.  meds reviewed with GI and MD

## 2017-08-21 NOTE — DISCHARGE NOTE ADULT - CARE PROVIDER_API CALL
PMD,   call for appointment  Phone: (   )    -  Fax: (   )    -    Ramakrishna Dooley), Internal Medicine  [address]  [place] [address]  Phone: [phone]  Fax: [phone]

## 2017-08-21 NOTE — DISCHARGE NOTE ADULT - MEDICATION SUMMARY - MEDICATIONS TO TAKE
I will START or STAY ON the medications listed below when I get home from the hospital:    mesalamine 4 g/60 mL rectal enema  -- 60 milliliter(s) rectally once a day (at bedtime)  -- Indication: For Ulcerative colitis with complication    mesalamine 500 mg oral capsule, extended release  -- 2 cap(s) by mouth 4 times a day  -- Indication: For Ulcerative colitis with complication    ocular lubricant ophthalmic solution  -- 1 drop(s) to each affected eye every 8 hours, As Needed  -- Indication: For Eye drops    pantoprazole 40 mg oral delayed release tablet  -- 1 tab(s) by mouth once a day (before a meal)  -- Indication: For GERD    folic acid 1 mg oral tablet  -- 1 tab(s) by mouth once a day  -- Indication: For supplement

## 2017-08-21 NOTE — DISCHARGE NOTE ADULT - PLAN OF CARE
continue meds, follow up with GI follow up with your Gastroenterologist in one week - call for appointment

## 2017-08-21 NOTE — PROGRESS NOTE ADULT - SUBJECTIVE AND OBJECTIVE BOX
INTERVAL HPI/OVERNIGHT EVENTS: I feel fine .   Vital Signs Last 24 Hrs  T(C): 36.6 (21 Aug 2017 11:04), Max: 36.9 (20 Aug 2017 13:37)  T(F): 97.8 (21 Aug 2017 11:04), Max: 98.5 (20 Aug 2017 13:37)  HR: 65 (21 Aug 2017 11:04) (52 - 65)  BP: 115/64 (21 Aug 2017 11:04) (112/67 - 135/86)  BP(mean): --  RR: 17 (21 Aug 2017 11:04) (17 - 18)  SpO2: 99% (21 Aug 2017 11:04) (98% - 99%)  I&O's Summary    MEDICATIONS  (STANDING):  mesalamine Enema 4 Gram(s) Rectal at bedtime  pantoprazole    Tablet 40 milliGRAM(s) Oral before breakfast  folic acid 1 milliGRAM(s) Oral daily  enoxaparin Injectable 40 milliGRAM(s) SubCutaneous daily    MEDICATIONS  (PRN):  acetaminophen   Tablet 650 milliGRAM(s) Oral every 6 hours PRN For Temp greater than 38 C (100.4 F)  acetaminophen   Tablet. 650 milliGRAM(s) Oral every 6 hours PRN Mild Pain (1 - 3)  oxyCODONE    IR 5 milliGRAM(s) Oral every 6 hours PRN Moderate Pain (4 - 6)  morphine  - Injectable 1 milliGRAM(s) IV Push every 4 hours PRN Severe Pain (7 - 10)  docusate sodium 100 milliGRAM(s) Oral two times a day PRN Constipation  senna 2 Tablet(s) Oral at bedtime PRN Constipation    LABS:                        14.5   6.59  )-----------( 221      ( 21 Aug 2017 06:31 )             45.2     08-21    140  |  100  |  6<L>  ----------------------------<  87  3.8   |  25  |  0.83    Ca    9.4      21 Aug 2017 06:31  Phos  3.8     08-21  Mg     1.7     08-21          CAPILLARY BLOOD GLUCOSE              REVIEW OF SYSTEMS:  CONSTITUTIONAL: No fever, weight loss, or fatigue  EYES: No eye pain, visual disturbances, or discharge  ENMT:  No difficulty hearing, tinnitus, vertigo; No sinus or throat pain  NECK: No pain or stiffness  BREASTS: No pain, masses, or nipple discharge  RESPIRATORY: No cough, wheezing, chills or hemoptysis; No shortness of breath  CARDIOVASCULAR: No chest pain, palpitations, dizziness, or leg swelling  GASTROINTESTINAL: No abdominal or epigastric pain. No nausea, vomiting, or hematemesis; No diarrhea or constipation. No melena or hematochezia.  GENITOURINARY: No dysuria, frequency, hematuria, or incontinence  NEUROLOGICAL: No headaches, memory loss, loss of strength, numbness, or tremors  SKIN: No itching, burning, rashes, or lesions   LYMPH NODES: No enlarged glands  ENDOCRINE: No heat or cold intolerance; No hair loss  MUSCULOSKELETAL: No joint pain or swelling; No muscle, back, or extremity pain  PSYCHIATRIC: No depression, anxiety, mood swings, or difficulty sleeping  HEME/LYMPH: No easy bruising, or bleeding gums  ALLERY AND IMMUNOLOGIC: No hives or eczema    RADIOLOGY & ADDITIONAL TESTS:    Consultant(s) Notes Reviewed:  [x ] YES  [ ] NO    PHYSICAL EXAM:  GENERAL: NAD, well-groomed, well-developed,not in any distress ,  HEAD:  Atraumatic, Normocephalic  EYES: EOMI, PERRLA, conjunctiva and sclera clear  ENMT: No tonsillar erythema, exudates, or enlargement; Moist mucous membranes, Good dentition, No lesions  NECK: Supple, No JVD, Normal thyroid  NERVOUS SYSTEM:  Alert & Oriented X3, No focal deficit   CHEST/LUNG: Good air entry bilateral with no  rales, rhonchi, wheezing, or rubs  HEART: Regular rate and rhythm; No murmurs, rubs, or gallops  ABDOMEN: Soft, Nontender, Nondistended; Bowel sounds present  EXTREMITIES:  2+ Peripheral Pulses, No clubbing, cyanosis, or edema  SKIN: No rashes or lesions    Care Discussed with Consultants/Other Providers [ x] YES  [ ] NO

## 2017-08-21 NOTE — DISCHARGE NOTE ADULT - PROVIDER TOKENS
FREE:[LAST:[PMD],PHONE:[(   )    -],FAX:[(   )    -],ADDRESS:[call for appointment]],TOKEN:'[id number]'

## 2017-08-21 NOTE — DISCHARGE NOTE ADULT - ADDITIONAL INSTRUCTIONS
follow up with your PMD in one week - call for appointment  follow up with your Gastroenterologist in one week - call for appointment

## 2017-08-21 NOTE — DISCHARGE NOTE ADULT - CARE PLAN
Principal Discharge DX:	Ulcerative colitis with complication, unspecified location  Goal:	continue meds, follow up with GI  Instructions for follow-up, activity and diet:	follow up with your Gastroenterologist in one week - call for appointment

## 2017-08-22 VITALS
TEMPERATURE: 98 F | HEART RATE: 71 BPM | OXYGEN SATURATION: 98 % | SYSTOLIC BLOOD PRESSURE: 105 MMHG | DIASTOLIC BLOOD PRESSURE: 60 MMHG | RESPIRATION RATE: 18 BRPM

## 2017-08-22 LAB
BASOPHILS # BLD AUTO: 0.03 K/UL — SIGNIFICANT CHANGE UP (ref 0–0.2)
BASOPHILS NFR BLD AUTO: 0.5 % — SIGNIFICANT CHANGE UP (ref 0–2)
BUN SERPL-MCNC: 9 MG/DL — SIGNIFICANT CHANGE UP (ref 7–23)
CALCIUM SERPL-MCNC: 8.7 MG/DL — SIGNIFICANT CHANGE UP (ref 8.4–10.5)
CHLORIDE SERPL-SCNC: 102 MMOL/L — SIGNIFICANT CHANGE UP (ref 98–107)
CO2 SERPL-SCNC: 27 MMOL/L — SIGNIFICANT CHANGE UP (ref 22–31)
CREAT SERPL-MCNC: 0.74 MG/DL — SIGNIFICANT CHANGE UP (ref 0.5–1.3)
EOSINOPHIL # BLD AUTO: 0.24 K/UL — SIGNIFICANT CHANGE UP (ref 0–0.5)
EOSINOPHIL NFR BLD AUTO: 4 % — SIGNIFICANT CHANGE UP (ref 0–6)
GLUCOSE SERPL-MCNC: 90 MG/DL — SIGNIFICANT CHANGE UP (ref 70–99)
HCT VFR BLD CALC: 41.6 % — SIGNIFICANT CHANGE UP (ref 39–50)
HGB BLD-MCNC: 13.7 G/DL — SIGNIFICANT CHANGE UP (ref 13–17)
IMM GRANULOCYTES # BLD AUTO: 0.01 # — SIGNIFICANT CHANGE UP
IMM GRANULOCYTES NFR BLD AUTO: 0.2 % — SIGNIFICANT CHANGE UP (ref 0–1.5)
LYMPHOCYTES # BLD AUTO: 2.36 K/UL — SIGNIFICANT CHANGE UP (ref 1–3.3)
LYMPHOCYTES # BLD AUTO: 39.1 % — SIGNIFICANT CHANGE UP (ref 13–44)
MAGNESIUM SERPL-MCNC: 2.1 MG/DL — SIGNIFICANT CHANGE UP (ref 1.6–2.6)
MCHC RBC-ENTMCNC: 28.5 PG — SIGNIFICANT CHANGE UP (ref 27–34)
MCHC RBC-ENTMCNC: 32.9 % — SIGNIFICANT CHANGE UP (ref 32–36)
MCV RBC AUTO: 86.7 FL — SIGNIFICANT CHANGE UP (ref 80–100)
MONOCYTES # BLD AUTO: 0.6 K/UL — SIGNIFICANT CHANGE UP (ref 0–0.9)
MONOCYTES NFR BLD AUTO: 9.9 % — SIGNIFICANT CHANGE UP (ref 2–14)
NEUTROPHILS # BLD AUTO: 2.8 K/UL — SIGNIFICANT CHANGE UP (ref 1.8–7.4)
NEUTROPHILS NFR BLD AUTO: 46.3 % — SIGNIFICANT CHANGE UP (ref 43–77)
NRBC # FLD: 0 — SIGNIFICANT CHANGE UP
PLATELET # BLD AUTO: 192 K/UL — SIGNIFICANT CHANGE UP (ref 150–400)
PMV BLD: 9.8 FL — SIGNIFICANT CHANGE UP (ref 7–13)
POTASSIUM SERPL-MCNC: 3.9 MMOL/L — SIGNIFICANT CHANGE UP (ref 3.5–5.3)
POTASSIUM SERPL-SCNC: 3.9 MMOL/L — SIGNIFICANT CHANGE UP (ref 3.5–5.3)
RBC # BLD: 4.8 M/UL — SIGNIFICANT CHANGE UP (ref 4.2–5.8)
RBC # FLD: 13.6 % — SIGNIFICANT CHANGE UP (ref 10.3–14.5)
SODIUM SERPL-SCNC: 138 MMOL/L — SIGNIFICANT CHANGE UP (ref 135–145)
SURGICAL PATHOLOGY STUDY: SIGNIFICANT CHANGE UP
WBC # BLD: 6.04 K/UL — SIGNIFICANT CHANGE UP (ref 3.8–10.5)
WBC # FLD AUTO: 6.04 K/UL — SIGNIFICANT CHANGE UP (ref 3.8–10.5)

## 2017-08-22 RX ADMIN — MORPHINE SULFATE 1 MILLIGRAM(S): 50 CAPSULE, EXTENDED RELEASE ORAL at 03:03

## 2017-08-22 RX ADMIN — ENOXAPARIN SODIUM 40 MILLIGRAM(S): 100 INJECTION SUBCUTANEOUS at 11:42

## 2017-08-22 RX ADMIN — Medication 1000 MILLIGRAM(S): at 03:03

## 2017-08-22 RX ADMIN — Medication 1000 MILLIGRAM(S): at 08:57

## 2017-08-22 RX ADMIN — PANTOPRAZOLE SODIUM 40 MILLIGRAM(S): 20 TABLET, DELAYED RELEASE ORAL at 05:26

## 2017-08-22 RX ADMIN — Medication 1 MILLIGRAM(S): at 11:42

## 2017-08-22 RX ADMIN — OXYCODONE HYDROCHLORIDE 5 MILLIGRAM(S): 5 TABLET ORAL at 08:57

## 2017-08-22 RX ADMIN — Medication 1000 MILLIGRAM(S): at 11:42

## 2017-08-22 RX ADMIN — MORPHINE SULFATE 1 MILLIGRAM(S): 50 CAPSULE, EXTENDED RELEASE ORAL at 03:18

## 2017-08-22 RX ADMIN — OXYCODONE HYDROCHLORIDE 5 MILLIGRAM(S): 5 TABLET ORAL at 09:27

## 2017-08-22 NOTE — PROGRESS NOTE ADULT - SUBJECTIVE AND OBJECTIVE BOX
INTERVAL HPI/OVERNIGHT EVENTS: feel much better   Vital Signs Last 24 Hrs  T(C): 36.8 (22 Aug 2017 05:20), Max: 37.1 (21 Aug 2017 21:26)  T(F): 98.3 (22 Aug 2017 05:20), Max: 98.7 (21 Aug 2017 21:26)  HR: 65 (22 Aug 2017 05:20) (53 - 65)  BP: 95/40 (22 Aug 2017 05:20) (95/40 - 120/60)  BP(mean): --  RR: 18 (22 Aug 2017 05:20) (18 - 18)  SpO2: 98% (22 Aug 2017 05:20) (98% - 99%)  I&O's Summary    MEDICATIONS  (STANDING):  mesalamine Enema 4 Gram(s) Rectal at bedtime  pantoprazole    Tablet 40 milliGRAM(s) Oral before breakfast  folic acid 1 milliGRAM(s) Oral daily  enoxaparin Injectable 40 milliGRAM(s) SubCutaneous daily  mesalamine ER Capsule 1000 milliGRAM(s) Oral four times a day    MEDICATIONS  (PRN):  acetaminophen   Tablet 650 milliGRAM(s) Oral every 6 hours PRN For Temp greater than 38 C (100.4 F)  acetaminophen   Tablet. 650 milliGRAM(s) Oral every 6 hours PRN Mild Pain (1 - 3)  oxyCODONE    IR 5 milliGRAM(s) Oral every 6 hours PRN Moderate Pain (4 - 6)  morphine  - Injectable 1 milliGRAM(s) IV Push every 4 hours PRN Severe Pain (7 - 10)  docusate sodium 100 milliGRAM(s) Oral two times a day PRN Constipation  senna 2 Tablet(s) Oral at bedtime PRN Constipation    LABS:                        13.7   6.04  )-----------( 192      ( 22 Aug 2017 06:55 )             41.6     08-22    138  |  102  |  9   ----------------------------<  90  3.9   |  27  |  0.74    Ca    8.7      22 Aug 2017 06:55  Phos  3.8     08-21  Mg     2.1     08-22          CAPILLARY BLOOD GLUCOSE              REVIEW OF SYSTEMS:  CONSTITUTIONAL: No fever, weight loss, or fatigue  EYES: No eye pain, visual disturbances, or discharge  ENMT:  No difficulty hearing, tinnitus, vertigo; No sinus or throat pain  NECK: No pain or stiffness  BREASTS: No pain, masses, or nipple discharge  RESPIRATORY: No cough, wheezing, chills or hemoptysis; No shortness of breath  CARDIOVASCULAR: No chest pain, palpitations, dizziness, or leg swelling  GASTROINTESTINAL: No abdominal or epigastric pain. No nausea, vomiting, or hematemesis; No diarrhea or constipation. No melena or hematochezia.  GENITOURINARY: No dysuria, frequency, hematuria, or incontinence  NEUROLOGICAL: No headaches, memory loss, loss of strength, numbness, or tremors  SKIN: No itching, burning, rashes, or lesions   LYMPH NODES: No enlarged glands  ENDOCRINE: No heat or cold intolerance; No hair loss  MUSCULOSKELETAL: No joint pain or swelling; No muscle, back, or extremity pain  PSYCHIATRIC: No depression, anxiety, mood swings, or difficulty sleeping  HEME/LYMPH: No easy bruising, or bleeding gums  ALLERY AND IMMUNOLOGIC: No hives or eczema    RADIOLOGY & ADDITIONAL TESTS:    Consultant(s) Notes Reviewed:  [x ] YES  [ ] NO    PHYSICAL EXAM:  GENERAL: NAD, well-groomed, well-developed,not in any distress ,  HEAD:  Atraumatic, Normocephalic  EYES: EOMI, PERRLA, conjunctiva and sclera clear  ENMT: No tonsillar erythema, exudates, or enlargement; Moist mucous membranes, Good dentition, No lesions  NECK: Supple, No JVD, Normal thyroid  NERVOUS SYSTEM:  Alert & Oriented X3, No focal deficit   CHEST/LUNG: Good air entry bilateral with no  rales, rhonchi, wheezing, or rubs  HEART: Regular rate and rhythm; No murmurs, rubs, or gallops  ABDOMEN: Soft, Nontender, Nondistended; Bowel sounds present  EXTREMITIES:  2+ Peripheral Pulses, No clubbing, cyanosis, or edema  SKIN: No rashes or lesions    Care Discussed with Consultants/Other Providers [ x] YES  [ ] NO

## 2018-07-16 PROBLEM — K63.5 POLYP OF COLON: Chronic | Status: ACTIVE | Noted: 2017-04-30

## 2018-08-01 ENCOUNTER — OUTPATIENT (OUTPATIENT)
Dept: OUTPATIENT SERVICES | Facility: HOSPITAL | Age: 38
LOS: 1 days | End: 2018-08-01
Payer: MEDICAID

## 2018-08-01 ENCOUNTER — OUTPATIENT (OUTPATIENT)
Dept: OUTPATIENT SERVICES | Facility: HOSPITAL | Age: 38
LOS: 1 days | End: 2018-08-01

## 2018-08-01 DIAGNOSIS — Z87.09 PERSONAL HISTORY OF OTHER DISEASES OF THE RESPIRATORY SYSTEM: Chronic | ICD-10-CM

## 2018-08-01 PROCEDURE — G9001: CPT

## 2018-08-08 ENCOUNTER — INPATIENT (INPATIENT)
Facility: HOSPITAL | Age: 38
LOS: 7 days | Discharge: HOME | End: 2018-08-16
Attending: INTERNAL MEDICINE | Admitting: INTERNAL MEDICINE
Payer: MEDICAID

## 2018-08-08 VITALS
TEMPERATURE: 98 F | SYSTOLIC BLOOD PRESSURE: 155 MMHG | RESPIRATION RATE: 16 BRPM | DIASTOLIC BLOOD PRESSURE: 63 MMHG | HEART RATE: 87 BPM | OXYGEN SATURATION: 99 %

## 2018-08-08 DIAGNOSIS — Z87.09 PERSONAL HISTORY OF OTHER DISEASES OF THE RESPIRATORY SYSTEM: Chronic | ICD-10-CM

## 2018-08-08 NOTE — ED ADULT TRIAGE NOTE - CHIEF COMPLAINT QUOTE
pt c/o 3 weeks of diarrhea with "brownish red blood." endorses cramping pain, nausea, and decreased appetite. hx colitis. no hx of transfusions, no c/o dizziness.

## 2018-08-09 DIAGNOSIS — K51.00 ULCERATIVE (CHRONIC) PANCOLITIS WITHOUT COMPLICATIONS: ICD-10-CM

## 2018-08-09 DIAGNOSIS — Z29.9 ENCOUNTER FOR PROPHYLACTIC MEASURES, UNSPECIFIED: ICD-10-CM

## 2018-08-09 DIAGNOSIS — R63.4 ABNORMAL WEIGHT LOSS: ICD-10-CM

## 2018-08-09 LAB
ALBUMIN SERPL ELPH-MCNC: 4.1 G/DL — SIGNIFICANT CHANGE UP (ref 3.3–5)
ALP SERPL-CCNC: 39 U/L — LOW (ref 40–120)
ALT FLD-CCNC: 10 U/L — SIGNIFICANT CHANGE UP (ref 4–41)
APPEARANCE UR: CLEAR — SIGNIFICANT CHANGE UP
APTT BLD: 43.7 SEC — HIGH (ref 27.5–37.4)
AST SERPL-CCNC: 16 U/L — SIGNIFICANT CHANGE UP (ref 4–40)
BASE EXCESS BLDV CALC-SCNC: 4.3 MMOL/L — SIGNIFICANT CHANGE UP
BASOPHILS # BLD AUTO: 0.05 K/UL — SIGNIFICANT CHANGE UP (ref 0–0.2)
BASOPHILS NFR BLD AUTO: 0.5 % — SIGNIFICANT CHANGE UP (ref 0–2)
BASOPHILS NFR SPEC: 0 % — SIGNIFICANT CHANGE UP (ref 0–2)
BILIRUB SERPL-MCNC: 0.3 MG/DL — SIGNIFICANT CHANGE UP (ref 0.2–1.2)
BILIRUB UR-MCNC: NEGATIVE — SIGNIFICANT CHANGE UP
BLASTS # FLD: 0 % — SIGNIFICANT CHANGE UP (ref 0–0)
BLOOD GAS VENOUS - CREATININE: 0.79 MG/DL — SIGNIFICANT CHANGE UP (ref 0.5–1.3)
BLOOD UR QL VISUAL: NEGATIVE — SIGNIFICANT CHANGE UP
BUN SERPL-MCNC: 7 MG/DL — SIGNIFICANT CHANGE UP (ref 7–23)
CALCIUM SERPL-MCNC: 9.2 MG/DL — SIGNIFICANT CHANGE UP (ref 8.4–10.5)
CHLORIDE BLDV-SCNC: 105 MMOL/L — SIGNIFICANT CHANGE UP (ref 96–108)
CHLORIDE SERPL-SCNC: 100 MMOL/L — SIGNIFICANT CHANGE UP (ref 98–107)
CO2 SERPL-SCNC: 22 MMOL/L — SIGNIFICANT CHANGE UP (ref 22–31)
COLOR SPEC: YELLOW — SIGNIFICANT CHANGE UP
CREAT SERPL-MCNC: 0.78 MG/DL — SIGNIFICANT CHANGE UP (ref 0.5–1.3)
EOSINOPHIL # BLD AUTO: 0.43 K/UL — SIGNIFICANT CHANGE UP (ref 0–0.5)
EOSINOPHIL NFR BLD AUTO: 4.3 % — SIGNIFICANT CHANGE UP (ref 0–6)
EOSINOPHIL NFR FLD: 3.5 % — SIGNIFICANT CHANGE UP (ref 0–6)
GAS PNL BLDV: 136 MMOL/L — SIGNIFICANT CHANGE UP (ref 136–146)
GIANT PLATELETS BLD QL SMEAR: PRESENT — SIGNIFICANT CHANGE UP
GLUCOSE BLDV-MCNC: 107 — HIGH (ref 70–99)
GLUCOSE SERPL-MCNC: 97 MG/DL — SIGNIFICANT CHANGE UP (ref 70–99)
GLUCOSE UR-MCNC: NEGATIVE — SIGNIFICANT CHANGE UP
HCO3 BLDV-SCNC: 26 MMOL/L — SIGNIFICANT CHANGE UP (ref 20–27)
HCT VFR BLD CALC: 46.4 % — SIGNIFICANT CHANGE UP (ref 39–50)
HCT VFR BLDV CALC: 47.5 % — SIGNIFICANT CHANGE UP (ref 39–51)
HGB BLD-MCNC: 14.8 G/DL — SIGNIFICANT CHANGE UP (ref 13–17)
HGB BLDV-MCNC: 15.5 G/DL — SIGNIFICANT CHANGE UP (ref 13–17)
IMM GRANULOCYTES # BLD AUTO: 0.02 # — SIGNIFICANT CHANGE UP
IMM GRANULOCYTES NFR BLD AUTO: 0.2 % — SIGNIFICANT CHANGE UP (ref 0–1.5)
INR BLD: 1.14 — SIGNIFICANT CHANGE UP (ref 0.88–1.17)
KETONES UR-MCNC: SIGNIFICANT CHANGE UP
LACTATE BLDV-MCNC: 1.7 MMOL/L — SIGNIFICANT CHANGE UP (ref 0.5–2)
LEUKOCYTE ESTERASE UR-ACNC: NEGATIVE — SIGNIFICANT CHANGE UP
LYMPHOCYTES # BLD AUTO: 2.29 K/UL — SIGNIFICANT CHANGE UP (ref 1–3.3)
LYMPHOCYTES # BLD AUTO: 22.8 % — SIGNIFICANT CHANGE UP (ref 13–44)
LYMPHOCYTES NFR SPEC AUTO: 22.6 % — SIGNIFICANT CHANGE UP (ref 13–44)
MCHC RBC-ENTMCNC: 27.8 PG — SIGNIFICANT CHANGE UP (ref 27–34)
MCHC RBC-ENTMCNC: 31.9 % — LOW (ref 32–36)
MCV RBC AUTO: 87.1 FL — SIGNIFICANT CHANGE UP (ref 80–100)
METAMYELOCYTES # FLD: 0 % — SIGNIFICANT CHANGE UP (ref 0–1)
MONOCYTES # BLD AUTO: 1.38 K/UL — HIGH (ref 0–0.9)
MONOCYTES NFR BLD AUTO: 13.7 % — SIGNIFICANT CHANGE UP (ref 2–14)
MONOCYTES NFR BLD: 9.6 % — HIGH (ref 2–9)
MUCOUS THREADS # UR AUTO: SIGNIFICANT CHANGE UP
MYELOCYTES NFR BLD: 0 % — SIGNIFICANT CHANGE UP (ref 0–0)
NEUTROPHIL AB SER-ACNC: 59.1 % — SIGNIFICANT CHANGE UP (ref 43–77)
NEUTROPHILS # BLD AUTO: 5.87 K/UL — SIGNIFICANT CHANGE UP (ref 1.8–7.4)
NEUTROPHILS NFR BLD AUTO: 58.5 % — SIGNIFICANT CHANGE UP (ref 43–77)
NEUTS BAND # BLD: 0 % — SIGNIFICANT CHANGE UP (ref 0–6)
NITRITE UR-MCNC: NEGATIVE — SIGNIFICANT CHANGE UP
NRBC # FLD: 0 — SIGNIFICANT CHANGE UP
OB PNL STL: NEGATIVE — SIGNIFICANT CHANGE UP
OTHER - HEMATOLOGY %: 0 — SIGNIFICANT CHANGE UP
OVALOCYTES BLD QL SMEAR: SLIGHT — SIGNIFICANT CHANGE UP
PCO2 BLDV: 49 MMHG — SIGNIFICANT CHANGE UP (ref 41–51)
PH BLDV: 7.39 PH — SIGNIFICANT CHANGE UP (ref 7.32–7.43)
PH UR: 6 — SIGNIFICANT CHANGE UP (ref 4.6–8)
PLATELET # BLD AUTO: 226 K/UL — SIGNIFICANT CHANGE UP (ref 150–400)
PLATELET COUNT - ESTIMATE: NORMAL — SIGNIFICANT CHANGE UP
PMV BLD: 9.7 FL — SIGNIFICANT CHANGE UP (ref 7–13)
PO2 BLDV: < 24 MMHG — LOW (ref 35–40)
POIKILOCYTOSIS BLD QL AUTO: SLIGHT — SIGNIFICANT CHANGE UP
POTASSIUM BLDV-SCNC: 3.8 MMOL/L — SIGNIFICANT CHANGE UP (ref 3.4–4.5)
POTASSIUM SERPL-MCNC: 4.5 MMOL/L — SIGNIFICANT CHANGE UP (ref 3.5–5.3)
POTASSIUM SERPL-SCNC: 4.5 MMOL/L — SIGNIFICANT CHANGE UP (ref 3.5–5.3)
PROMYELOCYTES # FLD: 0 % — SIGNIFICANT CHANGE UP (ref 0–0)
PROT SERPL-MCNC: 7.2 G/DL — SIGNIFICANT CHANGE UP (ref 6–8.3)
PROT UR-MCNC: 30 MG/DL — HIGH
PROTHROM AB SERPL-ACNC: 12.7 SEC — SIGNIFICANT CHANGE UP (ref 9.8–13.1)
RBC # BLD: 5.33 M/UL — SIGNIFICANT CHANGE UP (ref 4.2–5.8)
RBC # FLD: 13.8 % — SIGNIFICANT CHANGE UP (ref 10.3–14.5)
RBC CASTS # UR COMP ASSIST: SIGNIFICANT CHANGE UP (ref 0–?)
SAO2 % BLDV: 30.9 % — LOW (ref 60–85)
SODIUM SERPL-SCNC: 137 MMOL/L — SIGNIFICANT CHANGE UP (ref 135–145)
SP GR SPEC: > 1.04 — HIGH (ref 1–1.04)
UROBILINOGEN FLD QL: NORMAL MG/DL — SIGNIFICANT CHANGE UP
VARIANT LYMPHS # BLD: 5.2 % — SIGNIFICANT CHANGE UP
WBC # BLD: 10.04 K/UL — SIGNIFICANT CHANGE UP (ref 3.8–10.5)
WBC # FLD AUTO: 10.04 K/UL — SIGNIFICANT CHANGE UP (ref 3.8–10.5)

## 2018-08-09 PROCEDURE — 99223 1ST HOSP IP/OBS HIGH 75: CPT

## 2018-08-09 PROCEDURE — 74177 CT ABD & PELVIS W/CONTRAST: CPT | Mod: 26

## 2018-08-09 RX ORDER — FOLIC ACID 0.8 MG
1 TABLET ORAL DAILY
Qty: 0 | Refills: 0 | Status: DISCONTINUED | OUTPATIENT
Start: 2018-08-09 | End: 2018-08-16

## 2018-08-09 RX ORDER — MORPHINE SULFATE 50 MG/1
4 CAPSULE, EXTENDED RELEASE ORAL ONCE
Qty: 0 | Refills: 0 | Status: DISCONTINUED | OUTPATIENT
Start: 2018-08-09 | End: 2018-08-09

## 2018-08-09 RX ORDER — SODIUM CHLORIDE 9 MG/ML
1000 INJECTION INTRAMUSCULAR; INTRAVENOUS; SUBCUTANEOUS ONCE
Qty: 0 | Refills: 0 | Status: COMPLETED | OUTPATIENT
Start: 2018-08-09 | End: 2018-08-09

## 2018-08-09 RX ORDER — PANTOPRAZOLE SODIUM 20 MG/1
40 TABLET, DELAYED RELEASE ORAL
Qty: 0 | Refills: 0 | Status: DISCONTINUED | OUTPATIENT
Start: 2018-08-09 | End: 2018-08-16

## 2018-08-09 RX ORDER — MESALAMINE 400 MG
4 TABLET, DELAYED RELEASE (ENTERIC COATED) ORAL AT BEDTIME
Qty: 0 | Refills: 0 | Status: DISCONTINUED | OUTPATIENT
Start: 2018-08-09 | End: 2018-08-16

## 2018-08-09 RX ORDER — MESALAMINE 400 MG
500 TABLET, DELAYED RELEASE (ENTERIC COATED) ORAL
Qty: 0 | Refills: 0 | Status: DISCONTINUED | OUTPATIENT
Start: 2018-08-09 | End: 2018-08-16

## 2018-08-09 RX ORDER — HYDROCORTISONE 20 MG
100 TABLET ORAL EVERY 8 HOURS
Qty: 0 | Refills: 0 | Status: DISCONTINUED | OUTPATIENT
Start: 2018-08-09 | End: 2018-08-09

## 2018-08-09 RX ORDER — SODIUM CHLORIDE 9 MG/ML
1000 INJECTION INTRAMUSCULAR; INTRAVENOUS; SUBCUTANEOUS
Qty: 0 | Refills: 0 | Status: DISCONTINUED | OUTPATIENT
Start: 2018-08-09 | End: 2018-08-15

## 2018-08-09 RX ORDER — ONDANSETRON 8 MG/1
4 TABLET, FILM COATED ORAL ONCE
Qty: 0 | Refills: 0 | Status: COMPLETED | OUTPATIENT
Start: 2018-08-09 | End: 2018-08-09

## 2018-08-09 RX ADMIN — MORPHINE SULFATE 4 MILLIGRAM(S): 50 CAPSULE, EXTENDED RELEASE ORAL at 08:00

## 2018-08-09 RX ADMIN — MORPHINE SULFATE 4 MILLIGRAM(S): 50 CAPSULE, EXTENDED RELEASE ORAL at 16:49

## 2018-08-09 RX ADMIN — MORPHINE SULFATE 4 MILLIGRAM(S): 50 CAPSULE, EXTENDED RELEASE ORAL at 07:30

## 2018-08-09 RX ADMIN — SODIUM CHLORIDE 75 MILLILITER(S): 9 INJECTION INTRAMUSCULAR; INTRAVENOUS; SUBCUTANEOUS at 23:50

## 2018-08-09 RX ADMIN — Medication 1 MILLIGRAM(S): at 18:05

## 2018-08-09 RX ADMIN — ONDANSETRON 4 MILLIGRAM(S): 8 TABLET, FILM COATED ORAL at 03:24

## 2018-08-09 RX ADMIN — SODIUM CHLORIDE 1000 MILLILITER(S): 9 INJECTION INTRAMUSCULAR; INTRAVENOUS; SUBCUTANEOUS at 03:24

## 2018-08-09 RX ADMIN — Medication 500 MILLIGRAM(S): at 18:05

## 2018-08-09 RX ADMIN — Medication 500 MILLIGRAM(S): at 23:50

## 2018-08-09 RX ADMIN — SODIUM CHLORIDE 75 MILLILITER(S): 9 INJECTION INTRAMUSCULAR; INTRAVENOUS; SUBCUTANEOUS at 12:18

## 2018-08-09 RX ADMIN — Medication 100 MILLIGRAM(S): at 15:10

## 2018-08-09 RX ADMIN — MORPHINE SULFATE 4 MILLIGRAM(S): 50 CAPSULE, EXTENDED RELEASE ORAL at 22:17

## 2018-08-09 RX ADMIN — MORPHINE SULFATE 4 MILLIGRAM(S): 50 CAPSULE, EXTENDED RELEASE ORAL at 22:32

## 2018-08-09 RX ADMIN — Medication 125 MILLIGRAM(S): at 07:30

## 2018-08-09 RX ADMIN — MORPHINE SULFATE 4 MILLIGRAM(S): 50 CAPSULE, EXTENDED RELEASE ORAL at 03:36

## 2018-08-09 RX ADMIN — MORPHINE SULFATE 4 MILLIGRAM(S): 50 CAPSULE, EXTENDED RELEASE ORAL at 16:34

## 2018-08-09 RX ADMIN — Medication 4 GRAM(S): at 22:19

## 2018-08-09 RX ADMIN — MORPHINE SULFATE 4 MILLIGRAM(S): 50 CAPSULE, EXTENDED RELEASE ORAL at 03:58

## 2018-08-09 NOTE — CONSULT NOTE ADULT - ASSESSMENT
Impression:  # Pancolitis likely secondary to underlying IBD. Impression:  # Pancolitis likely secondary to underlying IBD. Can't rule out infectious colitis at this time    Recommendations:  - Check C. Diff, Stool PCR, Stool Culture, Fecal Calprotectin  - Supportive care per primary team    Dulce León MD  Gastroenterology Fellow  [phone]  Please page on call fellow on weekends and after 5pm on weekdays Impression:  # Pancolitis likely secondary to underlying IBD. Can't rule out infectious colitis at this time    Recommendations:  - Check C. Diff, Stool PCR, Stool Culture, Fecal Calprotectin  - Diet as tolerated  - Hold steroids until infectious etiologies ruled out  - Check ESR, CRP, QFN, HBV Serologies  - OSH records from [facility] (primary team to obtain)  - Flex sig tomorrow to r/o CMV    Dulce eLón MD  Gastroenterology Fellow  [phone]  Please page on call fellow on weekends and after 5pm on weekdays

## 2018-08-09 NOTE — CONSULT NOTE ADULT - SUBJECTIVE AND OBJECTIVE BOX
Chief Complaint:  Patient is a 37y old  Male who presents with a chief complaint of "Indeterminate colitis flare" (09 Aug 2018 11:41)    HPI:  37 year old man with hx of indeterminate colitis (Initially diagnosed with UC, but worked up at [facility] and diagnosed with indeterminate colitis) on mesalamine, golimumab and rifaximin presents with progressively worsening abdominal pain, diarrhea, chills and 10 lb weight loss x 3 weeks. Patient reports he has been on Golimumab for the past two months and overall feels his symptoms have worsened since then. He has multiple episode of loose brown stools whenever he eats, but reports over the past two days he has noted blood speckled within the stools. He reports the abdominal pain is throbbing/cramping, nonradiating, waxing and waning and worsened by food intake. Denies any specific alleviating factors. Has noted worsening fatigue over this time as well as nausea. Denies fevers, chest pain, shortness of breath, vomiting, sick contacts and recent travel.    Was initially being seen by a GI at [place], but that doctor moved.    Last colonoscopy: 6/24/18: IBD Quiescent colitis multiple pseudopolyps, no active ulcer, mass, stricture or bleeding.    ED Course:  VS: Afebrile, BP: 155/63, HR: 87, RR: 18, O2 sat: 100% on RA  Given 1L NS Bolus and Solumedrol 125mg IV x 1    Allergies:  Lialda (Hives)    Home Medications:  Mesalamine  Golimumab  Rifaximin  Probioitics    Hospital Medications:  hydrocortisone sodium succinate Injectable 100 milliGRAM(s) IV Push every 8 hours  sodium chloride 0.9%. 1000 milliLiter(s) IV Continuous <Continuous>    PMHX/PSHX:  Polyp of colon, unspecified part of colon, unspecified type  Condyloma  Herpes simplex type 2 infection  Herpes labialis  Ulcerative colitis  History of tracheostomy as a child  S/P knee surgery    Family history:  Denies family history of colon cancer/polyps, stomach cancer/polyps, pancreatic cancer/masses, liver cancer/disease, ovarian cancer and endometrial cancer.     Social History:   Social alcohol and tobacco use  Denies drug use  Currently unemployed    ROS:   General:  No fevers, + chills  ENT:  No sore throat or dysphagia  CV:  No pain or palpitations  Resp:  No dyspnea, cough, wheezing  GI:  + pain, + nausea, + diarrhea, + weight loss, + diarrhea, + rectal bleeding, No vomiting, No tarry stools  Heme:  No petechiae or bruising  Skin:  No rash or edema    PHYSICAL EXAM:   GENERAL:  NAD, Appears stated age  HEENT:  NC/AT,  conjunctivae clear and pink, sclera -anicteric  CHEST:  CTA B/L, Normal effort  HEART:  RRR S1/S2, No murmurs  ABDOMEN:  Soft, Diffusely tender, non-distended, +BS, no hepatomegaly, no signs of chronic liver disease  EXTEREMITIES:  No cyanosis or Edema  SKIN:  Warm & Dry. No rash or erythema  NEURO:  Alert, oriented    Vital Signs:  Vital Signs Last 24 Hrs  T(C): 37.2 (09 Aug 2018 11:48), Max: 37.6 (09 Aug 2018 00:14)  T(F): 99 (09 Aug 2018 11:48), Max: 99.7 (09 Aug 2018 00:14)  HR: 72 (09 Aug 2018 11:48) (69 - 87)  BP: 121/71 (09 Aug 2018 11:48) (105/73 - 155/63)  BP(mean): --  RR: 16 (09 Aug 2018 11:48) (16 - 16)  SpO2: 98% (09 Aug 2018 11:48) (98% - 100%)  Daily     Daily     LABS:                        14.8   10.04 )-----------( 226      ( 09 Aug 2018 02:39 )             46.4     Mean Cell Volume: 87.1 fL (08-09-18 @ 02:39)    08-09    137  |  100  |  7   ----------------------------<  97  4.5   |  22  |  0.78    Ca    9.2      09 Aug 2018 02:39    LIVER FUNCTIONS - ( 09 Aug 2018 02:39 )  Alb: 4.1 g/dL / Pro: 7.2 g/dL / ALK PHOS: 39 u/L / ALT: 10 u/L / AST: 16 u/L / GGT: x           PT/INR - ( 09 Aug 2018 03:11 )   PT: 12.7 SEC;   INR: 1.14        PTT - ( 09 Aug 2018 03:11 )  PTT:43.7 SEC  Urinalysis Basic - ( 09 Aug 2018 08:45 )    Color: YELLOW / Appearance: CLEAR / SG: > 1.040 / pH: 6.0  Gluc: NEGATIVE / Ketone: MODERATE  / Bili: NEGATIVE / Urobili: NORMAL mg/dL   Blood: NEGATIVE / Protein: 30 mg/dL / Nitrite: NEGATIVE   Leuk Esterase: NEGATIVE / RBC: 0-2 / WBC x   Sq Epi: x / Non Sq Epi: x / Bacteria: x                         14.8   10.04 )-----------( 226      ( 09 Aug 2018 02:39 )             46.4     Imaging:  < from: CT Abdomen and Pelvis w/ Oral Cont and w/ IV Cont (08.09.18 @ 06:50) >  FINDINGS:    LOWER CHEST: Within normal limits.    LIVER: Stable segment 7 flash filling hemangiomas.  BILE DUCTS: Normal caliber.  GALLBLADDER: Within normal limits.  SPLEEN: Within normal limits.  PANCREAS: Within normal limits.  ADRENALS: Within normal limits.  KIDNEYS/URETERS: Within normal limits.  BLADDER: Within normal limits.  REPRODUCTIVE ORGANS: The prostate is within normal limits.   BOWEL: Contiguous hyperemia and mild wall thickening of the colon from rectum to cecum. Normal appendix. No bowel obstruction.   PERITONEUM: No ascites.  VESSELS:  Within normal limits.  RETROPERITONEUM: No lymphadenopathy.    ABDOMINAL WALL: Within normal limits.  BONES: Within normal limits.    IMPRESSION: Acute pan colonic ulcerative colitis flare.  < end of copied text > Chief Complaint:  Patient is a 37y old  Male who presents with a chief complaint of "Indeterminate colitis flare" (09 Aug 2018 11:41)    HPI:  37 year old man with hx of indeterminate colitis (Initially diagnosed with UC 18 years ago, but worked up at [facility] and diagnosed with indeterminate colitis) on mesalamine, golimumab and rifaximin presents with progressively worsening abdominal pain, diarrhea, chills and 10 lb weight loss x 3 weeks. Patient reports he has been on Golimumab for the past two months and overall feels his symptoms have worsened since then. He has multiple episode of loose brown stools whenever he eats, but reports over the past two days he has noted blood speckled within the stools. He reports the abdominal pain is throbbing/cramping, nonradiating, waxing and waning and worsened by food intake. Denies any specific alleviating factors. Has noted worsening fatigue over this time as well as nausea. Denies fevers, chest pain, shortness of breath, vomiting, sick contacts and recent travel.    Was initially being seen by a GI at [place], but that doctor moved. Patient has tried Humira in the past with no response. Has not had Entyvio or Remicade before    Last colonoscopy: 6/24/18: IBD Quiescent colitis multiple pseudopolyps, no active ulcer, mass, stricture or bleeding.    ED Course:  VS: Afebrile, BP: 155/63, HR: 87, RR: 18, O2 sat: 100% on RA  Given 1L NS Bolus and Solumedrol 125mg IV x 1    Allergies:  Lialda (Hives)    Home Medications:  Mesalamine  Golimumab  Rifaximin  Probioitics    Hospital Medications:  hydrocortisone sodium succinate Injectable 100 milliGRAM(s) IV Push every 8 hours  sodium chloride 0.9%. 1000 milliLiter(s) IV Continuous <Continuous>    PMHX/PSHX:  Polyp of colon, unspecified part of colon, unspecified type  Condyloma  Herpes simplex type 2 infection  Herpes labialis  Ulcerative colitis  History of tracheostomy as a child  S/P knee surgery    Family history:  Denies family history of colon cancer/polyps, stomach cancer/polyps, pancreatic cancer/masses, liver cancer/disease, ovarian cancer and endometrial cancer.     Social History:   Social alcohol and tobacco use  Denies drug use  Currently unemployed    ROS:   General:  No fevers, + chills  ENT:  No sore throat or dysphagia  CV:  No pain or palpitations  Resp:  No dyspnea, cough, wheezing  GI:  + pain, + nausea, + diarrhea, + weight loss, + diarrhea, + rectal bleeding, No vomiting, No tarry stools  Heme:  No petechiae or bruising  Skin:  No rash or edema    PHYSICAL EXAM:   GENERAL:  NAD, Appears stated age  HEENT:  NC/AT,  conjunctivae clear and pink, sclera -anicteric  CHEST:  CTA B/L, Normal effort  HEART:  RRR S1/S2, No murmurs  ABDOMEN:  Soft, Diffusely tender, non-distended, +BS, no hepatomegaly, no signs of chronic liver disease  EXTEREMITIES:  No cyanosis or Edema  SKIN:  Warm & Dry. No rash or erythema  NEURO:  Alert, oriented    Vital Signs:  Vital Signs Last 24 Hrs  T(C): 37.2 (09 Aug 2018 11:48), Max: 37.6 (09 Aug 2018 00:14)  T(F): 99 (09 Aug 2018 11:48), Max: 99.7 (09 Aug 2018 00:14)  HR: 72 (09 Aug 2018 11:48) (69 - 87)  BP: 121/71 (09 Aug 2018 11:48) (105/73 - 155/63)  BP(mean): --  RR: 16 (09 Aug 2018 11:48) (16 - 16)  SpO2: 98% (09 Aug 2018 11:48) (98% - 100%)  Daily     Daily     LABS:                        14.8   10.04 )-----------( 226      ( 09 Aug 2018 02:39 )             46.4     Mean Cell Volume: 87.1 fL (08-09-18 @ 02:39)    08-09    137  |  100  |  7   ----------------------------<  97  4.5   |  22  |  0.78    Ca    9.2      09 Aug 2018 02:39    LIVER FUNCTIONS - ( 09 Aug 2018 02:39 )  Alb: 4.1 g/dL / Pro: 7.2 g/dL / ALK PHOS: 39 u/L / ALT: 10 u/L / AST: 16 u/L / GGT: x           PT/INR - ( 09 Aug 2018 03:11 )   PT: 12.7 SEC;   INR: 1.14        PTT - ( 09 Aug 2018 03:11 )  PTT:43.7 SEC  Urinalysis Basic - ( 09 Aug 2018 08:45 )    Color: YELLOW / Appearance: CLEAR / SG: > 1.040 / pH: 6.0  Gluc: NEGATIVE / Ketone: MODERATE  / Bili: NEGATIVE / Urobili: NORMAL mg/dL   Blood: NEGATIVE / Protein: 30 mg/dL / Nitrite: NEGATIVE   Leuk Esterase: NEGATIVE / RBC: 0-2 / WBC x   Sq Epi: x / Non Sq Epi: x / Bacteria: x                         14.8   10.04 )-----------( 226      ( 09 Aug 2018 02:39 )             46.4     Imaging:  < from: CT Abdomen and Pelvis w/ Oral Cont and w/ IV Cont (08.09.18 @ 06:50) >  FINDINGS:    LOWER CHEST: Within normal limits.    LIVER: Stable segment 7 flash filling hemangiomas.  BILE DUCTS: Normal caliber.  GALLBLADDER: Within normal limits.  SPLEEN: Within normal limits.  PANCREAS: Within normal limits.  ADRENALS: Within normal limits.  KIDNEYS/URETERS: Within normal limits.  BLADDER: Within normal limits.  REPRODUCTIVE ORGANS: The prostate is within normal limits.   BOWEL: Contiguous hyperemia and mild wall thickening of the colon from rectum to cecum. Normal appendix. No bowel obstruction.   PERITONEUM: No ascites.  VESSELS:  Within normal limits.  RETROPERITONEUM: No lymphadenopathy.    ABDOMINAL WALL: Within normal limits.  BONES: Within normal limits.    IMPRESSION: Acute pan colonic ulcerative colitis flare.  < end of copied text > Chief Complaint:  Patient is a 37y old  Male who presents with a chief complaint of "Indeterminate colitis flare" (09 Aug 2018 11:41)    HPI:  37 year old man with hx of indeterminate colitis (Initially diagnosed with UC 18 years ago, but worked up at [facility] and diagnosed with indeterminate colitis) on mesalamine, golimumab and rifaximin presents with progressively worsening abdominal pain, diarrhea, chills and 10 lb weight loss x 3 weeks. Patient reports he has been on Golimumab for the past two months and overall feels his symptoms have worsened since then. He has multiple episode of loose brown stools whenever he eats, but reports over the past two days he has noted blood speckled within the stools. He reports the abdominal pain is throbbing/cramping, nonradiating, waxing and waning and worsened by food intake. Denies any specific alleviating factors. Has noted worsening fatigue over this time as well as nausea. Denies fevers, chest pain, shortness of breath, vomiting, sick contacts and recent travel.    Was initially being seen by a GI at [facility], but that doctor moved. Patient has tried Humira in the past with no response. Has not had Entyvio or Remicade before    Last colonoscopy: 6/24/18: IBD Quiescent colitis multiple pseudopolyps, no active ulcer, mass, stricture or bleeding.    ED Course:  VS: Afebrile, BP: 155/63, HR: 87, RR: 18, O2 sat: 100% on RA  Given 1L NS Bolus and Solumedrol 125mg IV x 1    Allergies:  Lialda (Hives)    Home Medications:  Mesalamine  Golimumab  Rifaximin  Probioitics    Hospital Medications:  hydrocortisone sodium succinate Injectable 100 milliGRAM(s) IV Push every 8 hours  sodium chloride 0.9%. 1000 milliLiter(s) IV Continuous <Continuous>    PMHX/PSHX:  Polyp of colon, unspecified part of colon, unspecified type  Condyloma  Herpes simplex type 2 infection  Herpes labialis  Ulcerative colitis  History of tracheostomy as a child  S/P knee surgery    Family history:  Denies family history of colon cancer/polyps, stomach cancer/polyps, pancreatic cancer/masses, liver cancer/disease, ovarian cancer and endometrial cancer.     Social History:   Social alcohol and tobacco use  Denies drug use  Currently unemployed    ROS:   General:  No fevers, + chills  ENT:  No sore throat or dysphagia  CV:  No pain or palpitations  Resp:  No dyspnea, cough, wheezing  GI:  + pain, + nausea, + diarrhea, + weight loss, + diarrhea, + rectal bleeding, No vomiting, No tarry stools  Heme:  No petechiae or bruising  Skin:  No rash or edema    PHYSICAL EXAM:   GENERAL:  NAD, Appears stated age  HEENT:  NC/AT,  conjunctivae clear and pink, sclera -anicteric  CHEST:  CTA B/L, Normal effort  HEART:  RRR S1/S2, No murmurs  ABDOMEN:  Soft, Diffusely tender, non-distended, +BS, no hepatomegaly, no signs of chronic liver disease  EXTEREMITIES:  No cyanosis or Edema  SKIN:  Warm & Dry. No rash or erythema  NEURO:  Alert, oriented    Vital Signs:  Vital Signs Last 24 Hrs  T(C): 37.2 (09 Aug 2018 11:48), Max: 37.6 (09 Aug 2018 00:14)  T(F): 99 (09 Aug 2018 11:48), Max: 99.7 (09 Aug 2018 00:14)  HR: 72 (09 Aug 2018 11:48) (69 - 87)  BP: 121/71 (09 Aug 2018 11:48) (105/73 - 155/63)  BP(mean): --  RR: 16 (09 Aug 2018 11:48) (16 - 16)  SpO2: 98% (09 Aug 2018 11:48) (98% - 100%)  Daily     Daily     LABS:                        14.8   10.04 )-----------( 226      ( 09 Aug 2018 02:39 )             46.4     Mean Cell Volume: 87.1 fL (08-09-18 @ 02:39)    08-09    137  |  100  |  7   ----------------------------<  97  4.5   |  22  |  0.78    Ca    9.2      09 Aug 2018 02:39    LIVER FUNCTIONS - ( 09 Aug 2018 02:39 )  Alb: 4.1 g/dL / Pro: 7.2 g/dL / ALK PHOS: 39 u/L / ALT: 10 u/L / AST: 16 u/L / GGT: x           PT/INR - ( 09 Aug 2018 03:11 )   PT: 12.7 SEC;   INR: 1.14        PTT - ( 09 Aug 2018 03:11 )  PTT:43.7 SEC  Urinalysis Basic - ( 09 Aug 2018 08:45 )    Color: YELLOW / Appearance: CLEAR / SG: > 1.040 / pH: 6.0  Gluc: NEGATIVE / Ketone: MODERATE  / Bili: NEGATIVE / Urobili: NORMAL mg/dL   Blood: NEGATIVE / Protein: 30 mg/dL / Nitrite: NEGATIVE   Leuk Esterase: NEGATIVE / RBC: 0-2 / WBC x   Sq Epi: x / Non Sq Epi: x / Bacteria: x                         14.8   10.04 )-----------( 226      ( 09 Aug 2018 02:39 )             46.4     Imaging:  < from: CT Abdomen and Pelvis w/ Oral Cont and w/ IV Cont (08.09.18 @ 06:50) >  FINDINGS:    LOWER CHEST: Within normal limits.    LIVER: Stable segment 7 flash filling hemangiomas.  BILE DUCTS: Normal caliber.  GALLBLADDER: Within normal limits.  SPLEEN: Within normal limits.  PANCREAS: Within normal limits.  ADRENALS: Within normal limits.  KIDNEYS/URETERS: Within normal limits.  BLADDER: Within normal limits.  REPRODUCTIVE ORGANS: The prostate is within normal limits.   BOWEL: Contiguous hyperemia and mild wall thickening of the colon from rectum to cecum. Normal appendix. No bowel obstruction.   PERITONEUM: No ascites.  VESSELS:  Within normal limits.  RETROPERITONEUM: No lymphadenopathy.    ABDOMINAL WALL: Within normal limits.  BONES: Within normal limits.    IMPRESSION: Acute pan colonic ulcerative colitis flare.  < end of copied text >

## 2018-08-09 NOTE — ED PROVIDER NOTE - ATTENDING CONTRIBUTION TO CARE
38 y/o M with h/o UC (on mesalamine, rifaxamin, and an immunomodulator) here with 3 weeks abd pain, nausea, diarrhea, and chills.  Pt reports several episodes of watery diarrhea every day, occasionally with blood.  No fever or vomiting but poor appetite.  Decreased urine output.  Pt previously followed with GI at [facility], who has since left.  He attempted to schedule an appointment with another GI, but is scheduled for September.  Pt lying in stretcher, awake and alert, nontoxic.  VSS.  Dry mucous membranes.  Lungs cta bl.  Cards nl S1/S2, RRR, no MRG.  Abd soft diffusely tender, worse in RLQ and LLQ, no rebound or guarding.  No pedal edema or calf tenderness.  Plan for labs, urine, ct abd/pel r/o complication of colitis/intraabd infection, pain control, reassess.

## 2018-08-09 NOTE — ED PROVIDER NOTE - PROGRESS NOTE DETAILS
guaiac attempted, but no visible stool. Morphine for pain control, attending spoke with the patient regarding concerns for CT and will order CT w/ oral contrast.   rectal temp 99.2 nausea and pain improved with zofran and morphine Esvin PAZ: Pt cont to have abd pain.  CT showing pancolitis, will start steroids.  Pt previously admitted to Dr Pelayo.  Spoke with Dr Pelayo who is requesting admission to hospitalist.

## 2018-08-09 NOTE — ED PROVIDER NOTE - OBJECTIVE STATEMENT
38 yo male w. indeterminate colitis c/o 3 wks of diarrhea and abd cramping pain. Pt has similar colitis flares before but his GI moved to [place]. Pain come in waves and have 10-15 episodes of diarrhea each day, but denied any gross blood. Patient have also decreased PO intake and food worsens the pain. No recent abx use, travel hx, or med changes. Currently on Apriso, Canasa, rifaximin. Last colonoscopy was in may and was told no noticeable changes. No prior surgeries. Denied any fever, chestpain, shortness of breath, palpitation, or urinary issues.

## 2018-08-09 NOTE — H&P ADULT - HISTORY OF PRESENT ILLNESS
HPI:    Bam Mackenzie is a 38 y/o male with self reported history of "indeterminate colitis" on mesalamine, golimumab and rifaximin who presented to the ED complaining of progressively worsening diarrhea, abdominal pain, chills and weight loss for the last three weeks. The patient was recently seen in [facility], but was discharged from the ED after he refused a CT scan. He previously followed up with a GI doctor in [place], but the doctor moved and the patient was unable to secure a GI appointment until September. The patient reports about 10 episodes of watery diarrhea daily, and lately has noticed some blood mixed in. In addition the patient had 10 lbs weight loss with decreased energy around this same time period. He denies Initially, the patient was told that he had ulcerative colitis, but after a work up that was done at the [facility], the patient was told that his colitis is indeterminate. The patient denies dizziness, decreased urine output, shortness of breath, chest pain or palpitations.      PAST MEDICAL & SURGICAL HISTORY:  Polyp of colon, unspecified part of colon, unspecified type: ~ 60 per documentation in previous H&amp;P (per Dr Gary - Surgery)  Condyloma  Herpes simplex type 2 infection  Ulcerative colitis: indetermined colitis per patient  History of tracheostomy as a child  S/P knee surgery: right knee      Review of Systems:   CONSTITUTIONAL: +weight loss, no fevers  EYES: No eye pain, visual disturbances, or discharge  ENMT:  No difficulty hearing, tinnitus, vertigo; No sinus or throat pain  NECK: No pain or stiffness  RESPIRATORY: No cough, wheezing, chills or hemoptysis; No shortness of breath  CARDIOVASCULAR: No chest pain, palpitations, dizziness, or leg swelling  GASTROINTESTINAL: As per HPI  GENITOURINARY: No dysuria, frequency, hematuria, or incontinence  NEUROLOGICAL: No headaches, memory loss, loss of strength, numbness, or tremors  SKIN: No itching, burning, rashes, or lesions   LYMPH NODES: No enlarged glands  ENDOCRINE: No heat or cold intolerance; No hair loss  MUSCULOSKELETAL: No joint pain or swelling; No muscle, back, or extremity pain  PSYCHIATRIC: No depression, anxiety, mood swings, or difficulty sleeping  HEME/LYMPH: No easy bruising, or bleeding gums  ALLERGY AND IMMUNOLOGIC: No hives or eczema    Allergies    Lialda (Hives)    Intolerances        Social History:   Social History (marital status, living situation, occupation, tobacco use, alcohol and drug use, and sexual history): lives with family rare ETOH no IVDA 	        FAMILY HISTORY:  Family history of hypertension: father  Family history of cerebrovascular accident (Grandparent): grandmother  Family history of diabetes mellitus (Grandparent, Uncle): grandmother, uncle      MEDICATIONS  (STANDING):  sodium chloride 0.9%. 1000 milliLiter(s) (75 mL/Hr) IV Continuous <Continuous>    MEDICATIONS  (PRN):      T(C): 37.3 (08-09-18 @ 07:10), Max: 37.6 (08-09-18 @ 00:14)  HR: 69 (08-09-18 @ 07:10) (69 - 87)  BP: 105/73 (08-09-18 @ 07:10) (105/73 - 155/63)  RR: 16 (08-09-18 @ 07:10) (16 - 16)  SpO2: 100% (08-09-18 @ 07:10) (99% - 100%)    CAPILLARY BLOOD GLUCOSE        I&O's Summary      PHYSICAL EXAM:  GENERAL: NAD, well-developed  HEAD:  Atraumatic, Normocephalic  EYES: EOMI, PERRLA, conjunctiva and sclera clear  NECK: Supple, No elevated JVD  CHEST/LUNG: Clear to auscultation bilaterally; No wheeze  HEART: Regular rate and rhythm; No murmurs, rubs, or gallops  ABDOMEN: BS slightly decreased, diffuse tenderness, no guarding.  EXTREMITIES:  2+ Peripheral Pulses, No clubbing, cyanosis, or edema  PSYCH: AAOx3  NEUROLOGY: CN II-XII grossly intact, moving all extremities  SKIN: No rashes or lesions    LABS:                        14.8   10.04 )-----------( 226      ( 09 Aug 2018 02:39 )             46.4     08-09    137  |  100  |  7   ----------------------------<  97  4.5   |  22  |  0.78    Ca    9.2      09 Aug 2018 02:39    TPro  7.2  /  Alb  4.1  /  TBili  0.3  /  DBili  x   /  AST  16  /  ALT  10  /  AlkPhos  39<L>  08-09    PT/INR - ( 09 Aug 2018 03:11 )   PT: 12.7 SEC;   INR: 1.14          PTT - ( 09 Aug 2018 03:11 )  PTT:43.7 SEC      Urinalysis Basic - ( 09 Aug 2018 08:45 )    Color: YELLOW / Appearance: CLEAR / SG: > 1.040 / pH: 6.0  Gluc: NEGATIVE / Ketone: MODERATE  / Bili: NEGATIVE / Urobili: NORMAL mg/dL   Blood: NEGATIVE / Protein: 30 mg/dL / Nitrite: NEGATIVE   Leuk Esterase: NEGATIVE / RBC: 0-2 / WBC x   Sq Epi: x / Non Sq Epi: x / Bacteria: x          RADIOLOGY & ADDITIONAL TESTS:    Imaging Personally Reviewed and report read- Pancolitis noted    GI paged for consult

## 2018-08-09 NOTE — ED ADULT NURSE REASSESSMENT NOTE - NS ED NURSE REASSESS COMMENT FT1
pt aware that urine sample required. will continue to monitor.
pt c/o 7/10 pain, MD Mcallister aware, will medicate as per ordered.
pt reports alleviation from pain meds.
pt reports relief from pain medication, rates pain as 4/10. will continue to monitor.

## 2018-08-09 NOTE — PATIENT PROFILE ADULT. - NS TRANSFER PATIENT BELONGINGS
ipad, book, shorts,/Jewelry/Money (specify)/Cell Phone/PDA (specify)/Clothing/Electronic Device (specify)

## 2018-08-09 NOTE — ED PROVIDER NOTE - MEDICAL DECISION MAKING DETAILS
38 yo m w. UC c/o 3 wks diarrhea, similar symptoms before  CT r.o UC complications such as fistula, megacolon, perforation  pain control, labs and stool culture, r.o infection

## 2018-08-09 NOTE — ED PROVIDER NOTE - PMH
Condyloma    Herpes simplex type 2 infection    Polyp of colon, unspecified part of colon, unspecified type  ~ 60 per documentation in previous H&P (per Dr Gary - Surgery)  Ulcerative colitis  indetermined colitis per patient

## 2018-08-09 NOTE — ED ADULT NURSE NOTE - OBJECTIVE STATEMENT
pt received to the ed came in for colitis flare up. pt a&ox4 and ambulatory at baseline, skin intact, respirations even and unlabored, abd soft and non-distended tender to palpation in mid abd area. pt endorses 12 bouts of diarrhea today reporting that stool contained bright red blood. pt endorses chills and nausea but denies vomiting, cp, fever, sob or any other symptoms. pt has 20G in rt arm, labs drawn and sent. will continue to monitor.

## 2018-08-09 NOTE — H&P ADULT - ASSESSMENT
38 y/o with IBD flare on mesalamine, golimumab and rifaximin, currently experiencing about 10 loose stools a day, now with a bit of blood. He also has a 10 lbs weight loss in a period of a few weeks.

## 2018-08-09 NOTE — ED PROVIDER NOTE - PHYSICAL EXAMINATION
General: uncomfortable from pain  HENT: Atraumatic, PERRLA, EMOI, no sclera icterus, no post. oropharynx erythema, exudates, or tonsillar enlargement, uvula midline  Neck: no masses, pain on flexion, tenderness, or LAD, normal ROM and trachea midline   Cardiovascular: RRR, S1&2, no mumurs, rubs, radial pulses equal and b/l  Respiratory: CTABL, no wheezes or crackles, no decreased breath sounds  Abdominal: soft and tenderness in all quad. no distension, rigidity, guarding, or rebound, BSx4, no CVA tenderness, no hepatospenomegaly, negative smart's sign, rovsing's sign, mcburney's sign  Extremities: no edema of the legs/feet, DP/PT pulses equal and b/l  Skin: warm, well perfused, no rashes   MSK: no muscular atrophy, grossly FROM, normal gait  Neurologic: CN 2-12 grossly intact, +5 strength b/l & sensation intact b/l, nonfocal, AAOx3  Psych: normal mood and affect General: uncomfortable from pain  HENT: Atraumatic, PERRLA, EMOI, no sclera icterus, no post. oropharynx erythema, exudates, or tonsillar enlargement, uvula midline  Neck: no masses, pain on flexion, tenderness, or LAD, normal ROM and trachea midline   Cardiovascular: RRR, S1&2, no mumurs, rubs, radial pulses equal and b/l  Respiratory: CTABL, no wheezes or crackles, no decreased breath sounds  Abdominal: soft but tenderness in all quad. no distension, rigidity, guarding, or rebound, BSx4, no CVA tenderness, no hepatospenomegaly, negative smart's sign, rovsing's sign, mcburney's sign  Extremities: no edema of the legs/feet, DP/PT pulses equal and b/l  Skin: warm, well perfused, no rashes   MSK: no muscular atrophy, grossly FROM, normal gait  Neurologic: CN 2-12 grossly intact, +5 strength b/l & sensation intact b/l, nonfocal, AAOx3  Psych: normal mood and affect

## 2018-08-09 NOTE — ED ADULT NURSE NOTE - NSIMPLEMENTINTERV_GEN_ALL_ED
Implemented All Universal Safety Interventions:  [place] to call system. Call bell, personal items and telephone within reach. Instruct patient to call for assistance. Room bathroom lighting operational. Non-slip footwear when patient is off stretcher. Physically safe environment: no spills, clutter or unnecessary equipment. Stretcher in lowest position, wheels locked, appropriate side rails in place.

## 2018-08-09 NOTE — CONSULT NOTE ADULT - ATTENDING COMMENTS
Pt seen and examined. Agree with fellow's note. Plan discussed with patient and primary team.     Patient had chief complaint of colitis

## 2018-08-09 NOTE — ED PROVIDER NOTE - NS ED ROS FT
General: decreased PO, denies fever, chills, fatigue  HENT: denies nasal congestion, sore throat, rhinorrhea, ear pain, hearing loss  Eyes: denies visual changes, blurred vision, eye discharge, eye redness  Neck: denies neck pain, swelling, stiffness  CV: denies chest pain, palpitations  Resp: denies difficulty breathing, cough  GI: denies nausea, vomiting, diarrhea, abdominal pain, blood in stool, dark stool, constipation  Urinary: denies pain on urination, incontinence, change in urine frequency, color, smell  MSK: denies muscle weakness, aches, bony pain, leg pain, leg swelling  Neuro: denies headaches, numbness, tingling, dizziness, lightheadedness, syncope  Skin: denies rashes, cuts, bruises, jaundice  Hematologic: denies unexplained bruises

## 2018-08-10 ENCOUNTER — RESULT REVIEW (OUTPATIENT)
Age: 38
End: 2018-08-10

## 2018-08-10 DIAGNOSIS — Z71.89 OTHER SPECIFIED COUNSELING: ICD-10-CM

## 2018-08-10 LAB
ALBUMIN SERPL ELPH-MCNC: 3.6 G/DL — SIGNIFICANT CHANGE UP (ref 3.3–5)
ALP SERPL-CCNC: 32 U/L — LOW (ref 40–120)
ALT FLD-CCNC: 7 U/L — SIGNIFICANT CHANGE UP (ref 4–41)
AST SERPL-CCNC: 9 U/L — SIGNIFICANT CHANGE UP (ref 4–40)
BASOPHILS # BLD AUTO: 0.06 K/UL — SIGNIFICANT CHANGE UP (ref 0–0.2)
BASOPHILS NFR BLD AUTO: 0.6 % — SIGNIFICANT CHANGE UP (ref 0–2)
BILIRUB SERPL-MCNC: 0.3 MG/DL — SIGNIFICANT CHANGE UP (ref 0.2–1.2)
BUN SERPL-MCNC: 7 MG/DL — SIGNIFICANT CHANGE UP (ref 7–23)
C DIFF TOX GENS STL QL NAA+PROBE: SIGNIFICANT CHANGE UP
CALCIUM SERPL-MCNC: 8.4 MG/DL — SIGNIFICANT CHANGE UP (ref 8.4–10.5)
CHLORIDE SERPL-SCNC: 102 MMOL/L — SIGNIFICANT CHANGE UP (ref 98–107)
CO2 SERPL-SCNC: 24 MMOL/L — SIGNIFICANT CHANGE UP (ref 22–31)
CREAT SERPL-MCNC: 0.75 MG/DL — SIGNIFICANT CHANGE UP (ref 0.5–1.3)
CRP SERPL-MCNC: 5.5 MG/L — HIGH
EOSINOPHIL # BLD AUTO: 0.74 K/UL — HIGH (ref 0–0.5)
EOSINOPHIL NFR BLD AUTO: 7.3 % — HIGH (ref 0–6)
ERYTHROCYTE [SEDIMENTATION RATE] IN BLOOD: 7 MM/HR — SIGNIFICANT CHANGE UP (ref 1–15)
GLUCOSE SERPL-MCNC: 92 MG/DL — SIGNIFICANT CHANGE UP (ref 70–99)
HBA1C BLD-MCNC: 5.6 % — SIGNIFICANT CHANGE UP (ref 4–5.6)
HBV CORE IGM SER-ACNC: NONREACTIVE — SIGNIFICANT CHANGE UP
HBV SURFACE AB SER-ACNC: NONREACTIVE — SIGNIFICANT CHANGE UP
HBV SURFACE AG SER-ACNC: NEGATIVE — SIGNIFICANT CHANGE UP
HCT VFR BLD CALC: 39.2 % — SIGNIFICANT CHANGE UP (ref 39–50)
HGB BLD-MCNC: 12.8 G/DL — LOW (ref 13–17)
IMM GRANULOCYTES # BLD AUTO: 0.02 # — SIGNIFICANT CHANGE UP
IMM GRANULOCYTES NFR BLD AUTO: 0.2 % — SIGNIFICANT CHANGE UP (ref 0–1.5)
LYMPHOCYTES # BLD AUTO: 2.54 K/UL — SIGNIFICANT CHANGE UP (ref 1–3.3)
LYMPHOCYTES # BLD AUTO: 24.9 % — SIGNIFICANT CHANGE UP (ref 13–44)
MCHC RBC-ENTMCNC: 28.2 PG — SIGNIFICANT CHANGE UP (ref 27–34)
MCHC RBC-ENTMCNC: 32.7 % — SIGNIFICANT CHANGE UP (ref 32–36)
MCV RBC AUTO: 86.3 FL — SIGNIFICANT CHANGE UP (ref 80–100)
MONOCYTES # BLD AUTO: 1.49 K/UL — HIGH (ref 0–0.9)
MONOCYTES NFR BLD AUTO: 14.6 % — HIGH (ref 2–14)
NEUTROPHILS # BLD AUTO: 5.35 K/UL — SIGNIFICANT CHANGE UP (ref 1.8–7.4)
NEUTROPHILS NFR BLD AUTO: 52.4 % — SIGNIFICANT CHANGE UP (ref 43–77)
NRBC # FLD: 0 — SIGNIFICANT CHANGE UP
PLATELET # BLD AUTO: 201 K/UL — SIGNIFICANT CHANGE UP (ref 150–400)
PMV BLD: 9.7 FL — SIGNIFICANT CHANGE UP (ref 7–13)
POTASSIUM SERPL-MCNC: 3.6 MMOL/L — SIGNIFICANT CHANGE UP (ref 3.5–5.3)
POTASSIUM SERPL-SCNC: 3.6 MMOL/L — SIGNIFICANT CHANGE UP (ref 3.5–5.3)
PROT SERPL-MCNC: 6.1 G/DL — SIGNIFICANT CHANGE UP (ref 6–8.3)
RBC # BLD: 4.54 M/UL — SIGNIFICANT CHANGE UP (ref 4.2–5.8)
RBC # FLD: 13.8 % — SIGNIFICANT CHANGE UP (ref 10.3–14.5)
SODIUM SERPL-SCNC: 140 MMOL/L — SIGNIFICANT CHANGE UP (ref 135–145)
WBC # BLD: 10.2 K/UL — SIGNIFICANT CHANGE UP (ref 3.8–10.5)
WBC # FLD AUTO: 10.2 K/UL — SIGNIFICANT CHANGE UP (ref 3.8–10.5)

## 2018-08-10 PROCEDURE — 88342 IMHCHEM/IMCYTCHM 1ST ANTB: CPT | Mod: 26

## 2018-08-10 PROCEDURE — 99233 SBSQ HOSP IP/OBS HIGH 50: CPT

## 2018-08-10 PROCEDURE — 88305 TISSUE EXAM BY PATHOLOGIST: CPT | Mod: 26

## 2018-08-10 PROCEDURE — 45331 SIGMOIDOSCOPY AND BIOPSY: CPT

## 2018-08-10 RX ORDER — OXYCODONE HYDROCHLORIDE 5 MG/1
5 TABLET ORAL EVERY 6 HOURS
Qty: 0 | Refills: 0 | Status: DISCONTINUED | OUTPATIENT
Start: 2018-08-10 | End: 2018-08-12

## 2018-08-10 RX ORDER — ONDANSETRON 8 MG/1
4 TABLET, FILM COATED ORAL ONCE
Qty: 0 | Refills: 0 | Status: COMPLETED | OUTPATIENT
Start: 2018-08-10 | End: 2018-08-10

## 2018-08-10 RX ORDER — MORPHINE SULFATE 50 MG/1
4 CAPSULE, EXTENDED RELEASE ORAL ONCE
Qty: 0 | Refills: 0 | Status: DISCONTINUED | OUTPATIENT
Start: 2018-08-10 | End: 2018-08-10

## 2018-08-10 RX ORDER — MORPHINE SULFATE 50 MG/1
2 CAPSULE, EXTENDED RELEASE ORAL EVERY 6 HOURS
Qty: 0 | Refills: 0 | Status: DISCONTINUED | OUTPATIENT
Start: 2018-08-10 | End: 2018-08-12

## 2018-08-10 RX ORDER — ACETAMINOPHEN 500 MG
650 TABLET ORAL EVERY 6 HOURS
Qty: 0 | Refills: 0 | Status: DISCONTINUED | OUTPATIENT
Start: 2018-08-10 | End: 2018-08-16

## 2018-08-10 RX ADMIN — ONDANSETRON 4 MILLIGRAM(S): 8 TABLET, FILM COATED ORAL at 06:56

## 2018-08-10 RX ADMIN — OXYCODONE HYDROCHLORIDE 5 MILLIGRAM(S): 5 TABLET ORAL at 21:32

## 2018-08-10 RX ADMIN — MORPHINE SULFATE 4 MILLIGRAM(S): 50 CAPSULE, EXTENDED RELEASE ORAL at 07:10

## 2018-08-10 RX ADMIN — MORPHINE SULFATE 4 MILLIGRAM(S): 50 CAPSULE, EXTENDED RELEASE ORAL at 06:55

## 2018-08-10 RX ADMIN — ONDANSETRON 4 MILLIGRAM(S): 8 TABLET, FILM COATED ORAL at 14:21

## 2018-08-10 RX ADMIN — MORPHINE SULFATE 2 MILLIGRAM(S): 50 CAPSULE, EXTENDED RELEASE ORAL at 13:27

## 2018-08-10 RX ADMIN — OXYCODONE HYDROCHLORIDE 5 MILLIGRAM(S): 5 TABLET ORAL at 22:32

## 2018-08-10 RX ADMIN — MORPHINE SULFATE 2 MILLIGRAM(S): 50 CAPSULE, EXTENDED RELEASE ORAL at 14:42

## 2018-08-10 NOTE — CHART NOTE - NSCHARTNOTEFT_GEN_A_CORE
Spoke with pt regarding outpatient workup, pt states that he was diagnosed with some sort of bowel disorder ~17 years ago at [facility]. Pt states that he changed his GI doctor ~3 years ago but doesn't remember the name. Pt states will call sister to bring in outpatient records regarding diagnosis/workup. Will follow.    ADS  [id number]

## 2018-08-10 NOTE — PROGRESS NOTE ADULT - PROBLEM SELECTOR PLAN 1
c/w mesalamine , holding steroids until infection ruled out   IV fluids  GI consult appreciated   C. Diff negative, f/u , Stool PCR, Stool Culture, Fecal Calprotectin  ESR, CRP, , HBV Serologies noted   QFN pending   plan for  Flex sig today  to r/o CMV

## 2018-08-10 NOTE — PROGRESS NOTE ADULT - SUBJECTIVE AND OBJECTIVE BOX
Patient is a 37y old  Male who presents with a chief complaint of "Indeterminate colitis flare" (09 Aug 2018 11:41)      SUBJECTIVE / OVERNIGHT EVENTS: patient seen and examined by bedside, c/o N/v, abdominal pain , had chills before, now resolved       MEDICATIONS  (STANDING):  folic acid 1 milliGRAM(s) Oral daily  mesalamine Enema 4 Gram(s) Rectal at bedtime  mesalamine ER Capsule 500 milliGRAM(s) Oral four times a day  ondansetron Injectable 4 milliGRAM(s) IV Push once  pantoprazole    Tablet 40 milliGRAM(s) Oral before breakfast  sodium chloride 0.9%. 1000 milliLiter(s) (75 mL/Hr) IV Continuous <Continuous>    MEDICATIONS  (PRN):  acetaminophen   Tablet. 650 milliGRAM(s) Oral every 6 hours PRN Mild Pain (1 - 3)  artificial  tears Solution 1 Drop(s) Both EYES every 8 hours PRN Dry Eyes  morphine  - Injectable 2 milliGRAM(s) IV Push every 6 hours PRN Breakthrough pain  oxyCODONE    IR 5 milliGRAM(s) Oral every 6 hours PRN Moderate to severe pain      Vital Signs Last 24 Hrs  T(C): 37.1 (10 Aug 2018 06:39), Max: 37.2 (09 Aug 2018 15:48)  T(F): 98.7 (10 Aug 2018 06:39), Max: 99 (09 Aug 2018 15:48)  HR: 68 (10 Aug 2018 06:39) (59 - 68)  BP: 111/74 (10 Aug 2018 06:39) (106/64 - 115/68)  BP(mean): --  RR: 17 (10 Aug 2018 06:39) (17 - 18)  SpO2: 99% (10 Aug 2018 06:39) (97% - 100%)  CAPILLARY BLOOD GLUCOSE        I&O's Summary      PHYSICAL EXAM:  GENERAL: NAD, well-developed  HEAD:  Atraumatic, Normocephalic  EYES: EOMI, PERRLA, conjunctiva and sclera clear  NECK: Supple,  CHEST/LUNG: Clear to auscultation bilaterally; No wheeze  HEART: Regular rate and rhythm;   ABDOMEN: Soft, mild diffuse tenderness,  Nondistended; Bowel sounds present  EXTREMITIES:  2+ Peripheral Pulses, No clubbing, cyanosis, or edema  PSYCH: AAOx3  NEUROLOGY: non-focal  SKIN: No rashes or lesions    LABS:                        12.8   10.20 )-----------( 201      ( 10 Aug 2018 04:22 )             39.2     08-10    140  |  102  |  7   ----------------------------<  92  3.6   |  24  |  0.75    Ca    8.4      10 Aug 2018 04:22    TPro  6.1  /  Alb  3.6  /  TBili  0.3  /  DBili  x   /  AST  9   /  ALT  7   /  AlkPhos  32<L>  08-10    PT/INR - ( 09 Aug 2018 03:11 )   PT: 12.7 SEC;   INR: 1.14          PTT - ( 09 Aug 2018 03:11 )  PTT:43.7 SEC      Urinalysis Basic - ( 09 Aug 2018 08:45 )    Color: YELLOW / Appearance: CLEAR / SG: > 1.040 / pH: 6.0  Gluc: NEGATIVE / Ketone: MODERATE  / Bili: NEGATIVE / Urobili: NORMAL mg/dL   Blood: NEGATIVE / Protein: 30 mg/dL / Nitrite: NEGATIVE   Leuk Esterase: NEGATIVE / RBC: 0-2 / WBC x   Sq Epi: x / Non Sq Epi: x / Bacteria: x        RADIOLOGY & ADDITIONAL TESTS:    Imaging Personally Reviewed:    Consultant(s) Notes Reviewed:  GI    Care Discussed with Consultants/Other Providers:

## 2018-08-10 NOTE — CHART NOTE - NSCHARTNOTEFT_GEN_A_CORE
POST PROCEUDRE    < from: Flexible Sigmoidoscopy (08.10.18 @ 17:38) >    Findings:  Poor bowel preparation obscured views of the mucosa, though granular erythematous changes were seen. Retroflexion was not performed. See note                                                                                   Impression:            - Preparation of the colon was poor.  - Specimens collected for CMV colitis.    Recommendation:        - Await pathology results.  - Recommendations per GI progress notes.      Helen Mejia, PGY-5  GI fellow  [phone]- [phone]/ [phone]  Please call GI fellow on call after 5pm and on weekends

## 2018-08-10 NOTE — CHART NOTE - NSCHARTNOTEFT_GEN_A_CORE
GI Attending    Pt seen and examined. Plan discussed with patient and primary team.     Patient had chief complaint of colitis    *Pancolitis likely secondary to underlying IBD. Can't rule out infectious colitis at this time. Cdif negative. ESR wnl. CRP mildly elevated. HBV exposure seems less likely.    Recommendations:  - Check  Stool PCR, Stool Culture, Fecal Calprotectin  - Diet as tolerated  - Hold steroids until infectious etiologies ruled out  - Check ESR, CRP, QFN.  - HBV vaccination  - OSH records from [place] (primary team to obtain)  - F/u pathology from Flex sig    25 min spent in patient care, >50% in care coordination/counseling GI Attending    Pt seen and examined. Plan discussed with patient and primary team.     Patient had chief complaint of colitis    *Pancolitis likely secondary to underlying IBD. Can't rule out infectious colitis at this time. Cdif negative. ESR wnl. CRP mildly elevated. HBV exposure seems less likely.    Recommendations:  - Check  Stool PCR, Stool Culture, Fecal Calprotectin  - Diet as tolerated  - Hold steroids until infectious etiologies ruled out  - Check QFN.  - HBV vaccination  - OSH records from [place] (primary team to obtain)  - F/u pathology from Flex sig    25 min spent in patient care, >50% in care coordination/counseling

## 2018-08-11 LAB
ALBUMIN SERPL ELPH-MCNC: 3.3 G/DL — SIGNIFICANT CHANGE UP (ref 3.3–5)
ALP SERPL-CCNC: 30 U/L — LOW (ref 40–120)
ALT FLD-CCNC: 6 U/L — SIGNIFICANT CHANGE UP (ref 4–41)
AST SERPL-CCNC: 9 U/L — SIGNIFICANT CHANGE UP (ref 4–40)
BASOPHILS # BLD AUTO: 0.05 K/UL — SIGNIFICANT CHANGE UP (ref 0–0.2)
BASOPHILS NFR BLD AUTO: 0.6 % — SIGNIFICANT CHANGE UP (ref 0–2)
BILIRUB SERPL-MCNC: 0.3 MG/DL — SIGNIFICANT CHANGE UP (ref 0.2–1.2)
BUN SERPL-MCNC: 5 MG/DL — LOW (ref 7–23)
CALCIUM SERPL-MCNC: 8.3 MG/DL — LOW (ref 8.4–10.5)
CHLORIDE SERPL-SCNC: 99 MMOL/L — SIGNIFICANT CHANGE UP (ref 98–107)
CO2 SERPL-SCNC: 23 MMOL/L — SIGNIFICANT CHANGE UP (ref 22–31)
CREAT SERPL-MCNC: 0.72 MG/DL — SIGNIFICANT CHANGE UP (ref 0.5–1.3)
EOSINOPHIL # BLD AUTO: 0.55 K/UL — HIGH (ref 0–0.5)
EOSINOPHIL NFR BLD AUTO: 6.4 % — HIGH (ref 0–6)
GI PCR PANEL, STOOL: SIGNIFICANT CHANGE UP
GLUCOSE SERPL-MCNC: 109 MG/DL — HIGH (ref 70–99)
HCT VFR BLD CALC: 39.7 % — SIGNIFICANT CHANGE UP (ref 39–50)
HGB BLD-MCNC: 13 G/DL — SIGNIFICANT CHANGE UP (ref 13–17)
IMM GRANULOCYTES # BLD AUTO: 0.01 # — SIGNIFICANT CHANGE UP
IMM GRANULOCYTES NFR BLD AUTO: 0.1 % — SIGNIFICANT CHANGE UP (ref 0–1.5)
LYMPHOCYTES # BLD AUTO: 1.55 K/UL — SIGNIFICANT CHANGE UP (ref 1–3.3)
LYMPHOCYTES # BLD AUTO: 18 % — SIGNIFICANT CHANGE UP (ref 13–44)
MCHC RBC-ENTMCNC: 28.5 PG — SIGNIFICANT CHANGE UP (ref 27–34)
MCHC RBC-ENTMCNC: 32.7 % — SIGNIFICANT CHANGE UP (ref 32–36)
MCV RBC AUTO: 87.1 FL — SIGNIFICANT CHANGE UP (ref 80–100)
MONOCYTES # BLD AUTO: 1.26 K/UL — HIGH (ref 0–0.9)
MONOCYTES NFR BLD AUTO: 14.6 % — HIGH (ref 2–14)
NEUTROPHILS # BLD AUTO: 5.21 K/UL — SIGNIFICANT CHANGE UP (ref 1.8–7.4)
NEUTROPHILS NFR BLD AUTO: 60.3 % — SIGNIFICANT CHANGE UP (ref 43–77)
NRBC # FLD: 0 — SIGNIFICANT CHANGE UP
PLATELET # BLD AUTO: 194 K/UL — SIGNIFICANT CHANGE UP (ref 150–400)
PMV BLD: 9.8 FL — SIGNIFICANT CHANGE UP (ref 7–13)
POTASSIUM SERPL-MCNC: 3.5 MMOL/L — SIGNIFICANT CHANGE UP (ref 3.5–5.3)
POTASSIUM SERPL-SCNC: 3.5 MMOL/L — SIGNIFICANT CHANGE UP (ref 3.5–5.3)
PROT SERPL-MCNC: 5.8 G/DL — LOW (ref 6–8.3)
RBC # BLD: 4.56 M/UL — SIGNIFICANT CHANGE UP (ref 4.2–5.8)
RBC # FLD: 14 % — SIGNIFICANT CHANGE UP (ref 10.3–14.5)
SODIUM SERPL-SCNC: 137 MMOL/L — SIGNIFICANT CHANGE UP (ref 135–145)
SPECIMEN SOURCE: SIGNIFICANT CHANGE UP
SPECIMEN SOURCE: SIGNIFICANT CHANGE UP
WBC # BLD: 8.63 K/UL — SIGNIFICANT CHANGE UP (ref 3.8–10.5)
WBC # FLD AUTO: 8.63 K/UL — SIGNIFICANT CHANGE UP (ref 3.8–10.5)

## 2018-08-11 PROCEDURE — 99233 SBSQ HOSP IP/OBS HIGH 50: CPT

## 2018-08-11 RX ORDER — ONDANSETRON 8 MG/1
4 TABLET, FILM COATED ORAL EVERY 6 HOURS
Qty: 0 | Refills: 0 | Status: DISCONTINUED | OUTPATIENT
Start: 2018-08-11 | End: 2018-08-16

## 2018-08-11 RX ORDER — ONDANSETRON 8 MG/1
4 TABLET, FILM COATED ORAL ONCE
Qty: 0 | Refills: 0 | Status: COMPLETED | OUTPATIENT
Start: 2018-08-11 | End: 2018-08-11

## 2018-08-11 RX ORDER — MORPHINE SULFATE 50 MG/1
1 CAPSULE, EXTENDED RELEASE ORAL ONCE
Qty: 0 | Refills: 0 | Status: DISCONTINUED | OUTPATIENT
Start: 2018-08-11 | End: 2018-08-11

## 2018-08-11 RX ADMIN — SODIUM CHLORIDE 75 MILLILITER(S): 9 INJECTION INTRAMUSCULAR; INTRAVENOUS; SUBCUTANEOUS at 00:30

## 2018-08-11 RX ADMIN — MORPHINE SULFATE 1 MILLIGRAM(S): 50 CAPSULE, EXTENDED RELEASE ORAL at 12:17

## 2018-08-11 RX ADMIN — MORPHINE SULFATE 2 MILLIGRAM(S): 50 CAPSULE, EXTENDED RELEASE ORAL at 08:57

## 2018-08-11 RX ADMIN — Medication 1 MILLIGRAM(S): at 11:48

## 2018-08-11 RX ADMIN — MORPHINE SULFATE 1 MILLIGRAM(S): 50 CAPSULE, EXTENDED RELEASE ORAL at 11:47

## 2018-08-11 RX ADMIN — Medication 500 MILLIGRAM(S): at 18:00

## 2018-08-11 RX ADMIN — Medication 500 MILLIGRAM(S): at 06:39

## 2018-08-11 RX ADMIN — Medication 500 MILLIGRAM(S): at 23:21

## 2018-08-11 RX ADMIN — MORPHINE SULFATE 2 MILLIGRAM(S): 50 CAPSULE, EXTENDED RELEASE ORAL at 00:28

## 2018-08-11 RX ADMIN — Medication 500 MILLIGRAM(S): at 11:48

## 2018-08-11 RX ADMIN — PANTOPRAZOLE SODIUM 40 MILLIGRAM(S): 20 TABLET, DELAYED RELEASE ORAL at 06:39

## 2018-08-11 RX ADMIN — MORPHINE SULFATE 2 MILLIGRAM(S): 50 CAPSULE, EXTENDED RELEASE ORAL at 00:52

## 2018-08-11 RX ADMIN — Medication 4 GRAM(S): at 21:50

## 2018-08-11 RX ADMIN — Medication 4 GRAM(S): at 00:29

## 2018-08-11 RX ADMIN — MORPHINE SULFATE 2 MILLIGRAM(S): 50 CAPSULE, EXTENDED RELEASE ORAL at 21:48

## 2018-08-11 RX ADMIN — ONDANSETRON 4 MILLIGRAM(S): 8 TABLET, FILM COATED ORAL at 03:58

## 2018-08-11 RX ADMIN — Medication 500 MILLIGRAM(S): at 00:30

## 2018-08-11 RX ADMIN — MORPHINE SULFATE 2 MILLIGRAM(S): 50 CAPSULE, EXTENDED RELEASE ORAL at 09:27

## 2018-08-11 RX ADMIN — SODIUM CHLORIDE 75 MILLILITER(S): 9 INJECTION INTRAMUSCULAR; INTRAVENOUS; SUBCUTANEOUS at 06:39

## 2018-08-11 RX ADMIN — ONDANSETRON 4 MILLIGRAM(S): 8 TABLET, FILM COATED ORAL at 18:17

## 2018-08-11 RX ADMIN — MORPHINE SULFATE 2 MILLIGRAM(S): 50 CAPSULE, EXTENDED RELEASE ORAL at 22:05

## 2018-08-11 NOTE — CHART NOTE - NSCHARTNOTEFT_GEN_A_CORE
Called by NP due to Stool PCR positive for Enteropathic E. Coli    Recommend starting Cipro 400mg IV BID given patient history of indeterminate colitis and immunosuppression    Dulce León MD  Gastroenterology Fellow  [phone]  Please page on call fellow on weekends and after 5pm on weekdays

## 2018-08-11 NOTE — PROGRESS NOTE ADULT - SUBJECTIVE AND OBJECTIVE BOX
Patient is a 37y old  Male who presents with a chief complaint of "Indeterminate colitis flare" (09 Aug 2018 11:41)      SUBJECTIVE / OVERNIGHT EVENTS: patient seen and examined by bedside, still having abdominal pain,  and diarrhea 6 times since last night ,less bloody now       MEDICATIONS  (STANDING):  folic acid 1 milliGRAM(s) Oral daily  mesalamine Enema 4 Gram(s) Rectal at bedtime  mesalamine ER Capsule 500 milliGRAM(s) Oral four times a day  pantoprazole    Tablet 40 milliGRAM(s) Oral before breakfast  sodium chloride 0.9%. 1000 milliLiter(s) (75 mL/Hr) IV Continuous <Continuous>    MEDICATIONS  (PRN):  acetaminophen   Tablet. 650 milliGRAM(s) Oral every 6 hours PRN Mild Pain (1 - 3)  artificial  tears Solution 1 Drop(s) Both EYES every 8 hours PRN Dry Eyes  morphine  - Injectable 2 milliGRAM(s) IV Push every 6 hours PRN Breakthrough pain  oxyCODONE    IR 5 milliGRAM(s) Oral every 6 hours PRN Moderate to severe pain      Vital Signs Last 24 Hrs  T(C): 36.7 (11 Aug 2018 11:40), Max: 36.9 (10 Aug 2018 21:34)  T(F): 98 (11 Aug 2018 11:40), Max: 98.4 (10 Aug 2018 21:34)  HR: 68 (11 Aug 2018 11:40) (66 - 70)  BP: 117/63 (11 Aug 2018 11:40) (102/61 - 120/66)  BP(mean): --  RR: 18 (11 Aug 2018 11:40) (18 - 18)  SpO2: 100% (11 Aug 2018 11:40) (98% - 100%)  CAPILLARY BLOOD GLUCOSE        I&O's Summary    PHYSICAL EXAM:  GENERAL: NAD, well-developed  HEAD:  Atraumatic, Normocephalic  EYES: EOMI, PERRLA, conjunctiva and sclera clear  NECK: Supple,  CHEST/LUNG: Clear to auscultation bilaterally; No wheeze  HEART: Regular rate and rhythm;   ABDOMEN: Soft, mild diffuse tenderness,  Nondistended; Bowel sounds present  EXTREMITIES:  2+ Peripheral Pulses, No clubbing, cyanosis, or edema  PSYCH: AAOx3  NEUROLOGY: non-focal  SKIN: No rashes or lesions    LABS:                        13.0   8.63  )-----------( 194      ( 11 Aug 2018 06:51 )             39.7     08-11    137  |  99  |  5<L>  ----------------------------<  109<H>  3.5   |  23  |  0.72    Ca    8.3<L>      11 Aug 2018 06:51    TPro  5.8<L>  /  Alb  3.3  /  TBili  0.3  /  DBili  x   /  AST  9   /  ALT  6   /  AlkPhos  30<L>  08-11      GI PCR Panel, Stool:   GI panel PCR evaluates for:  Campylobacter, Plesiomonas shigelloides, Salmonella,  Yersinia enterocolitica, Vibrio, Enteroaggregative  Escherichia coli (EAEC), Enteropathogenic E. coli (EPEC),  Enterotoxigenic E. coli (ETEC) lt/st, Shiga-like  toxin-producing E. coli (STEC) stx1/stx2,  Shigella/Enteroinvasive E. coli (EIEC), Cryptosporidium,  Cyclospora cayetanensis, Entamoeba histolytica, Giardia  lamblia, Adenovirus F 40/41, Astrovirus, Norovirus GI/GII,  Rotavirus A, Sapovirus  **********************************************************  FINAL REPORT   ENTEROPATHOGENIC ECOLI (EPEC)   DETECTED BI PCR  ANALYSIS PERFORMED BY:  [facility]                          [address] [place] [address]                          Phone: [phone]                          Fax:   [phone]  :           CYNTHIA GALVAN MD  RESULT CALLED TO / READ BACK: CHOLO LAUREANO RN/Y  DATE / TIME CALLED: 08/11/18 1531  CALLED BY: DELFINA JARAMILLO  EPEC^Enteropathogenic E. coli  GI PCR PANEL: DETECTED by PCR (08.10.18 @ 08:58)            RADIOLOGY & ADDITIONAL TESTS:    Imaging Personally Reviewed:    Consultant(s) Notes Reviewed:  GI     Care Discussed with Consultants/Other Providers:

## 2018-08-11 NOTE — PROGRESS NOTE ADULT - PROBLEM SELECTOR PLAN 1
c/w mesalamine , holding steroids until infection ruled out   IV fluids  GI consult appreciated   C. Diff negative,, Stool PCR + for  ENTEROPATHOGENIC ECOLI (EPEC), will f/u GI recommendation, GI informed by KRISTAN Mccallum   f/u  Stool Culture, Fecal Calprotectin  ESR, CRP, , HBV Serologies noted   QFN pending   s/p   Flex sig on 8/10 f/u  pathology to r/o CMV

## 2018-08-11 NOTE — PROGRESS NOTE ADULT - ASSESSMENT
36 y/o with IBD flare on mesalamine, golimumab and rifaximin, currently experiencing about 10 loose stools a day, now with a bit of blood. He also has a 10 lbs weight loss in a period of a few weeks.

## 2018-08-12 LAB
ALBUMIN SERPL ELPH-MCNC: 2.9 G/DL — LOW (ref 3.3–5)
ALP SERPL-CCNC: 26 U/L — LOW (ref 40–120)
ALT FLD-CCNC: 7 U/L — SIGNIFICANT CHANGE UP (ref 4–41)
AST SERPL-CCNC: 10 U/L — SIGNIFICANT CHANGE UP (ref 4–40)
BACTERIA STL CULT: SIGNIFICANT CHANGE UP
BASOPHILS # BLD AUTO: 0.04 K/UL — SIGNIFICANT CHANGE UP (ref 0–0.2)
BASOPHILS NFR BLD AUTO: 0.4 % — SIGNIFICANT CHANGE UP (ref 0–2)
BILIRUB SERPL-MCNC: 0.3 MG/DL — SIGNIFICANT CHANGE UP (ref 0.2–1.2)
BUN SERPL-MCNC: 5 MG/DL — LOW (ref 7–23)
CALCIUM SERPL-MCNC: 8.2 MG/DL — LOW (ref 8.4–10.5)
CHLORIDE SERPL-SCNC: 99 MMOL/L — SIGNIFICANT CHANGE UP (ref 98–107)
CO2 SERPL-SCNC: 21 MMOL/L — LOW (ref 22–31)
CREAT SERPL-MCNC: 0.64 MG/DL — SIGNIFICANT CHANGE UP (ref 0.5–1.3)
EOSINOPHIL # BLD AUTO: 0.69 K/UL — HIGH (ref 0–0.5)
EOSINOPHIL NFR BLD AUTO: 7.1 % — HIGH (ref 0–6)
GAMMA INTERFERON BACKGROUND BLD IA-ACNC: 0.03 IU/ML — SIGNIFICANT CHANGE UP
GLUCOSE SERPL-MCNC: 91 MG/DL — SIGNIFICANT CHANGE UP (ref 70–99)
HCT VFR BLD CALC: 39.9 % — SIGNIFICANT CHANGE UP (ref 39–50)
HGB BLD-MCNC: 13.1 G/DL — SIGNIFICANT CHANGE UP (ref 13–17)
IMM GRANULOCYTES # BLD AUTO: 0.04 # — SIGNIFICANT CHANGE UP
IMM GRANULOCYTES NFR BLD AUTO: 0.4 % — SIGNIFICANT CHANGE UP (ref 0–1.5)
LYMPHOCYTES # BLD AUTO: 1.66 K/UL — SIGNIFICANT CHANGE UP (ref 1–3.3)
LYMPHOCYTES # BLD AUTO: 17.1 % — SIGNIFICANT CHANGE UP (ref 13–44)
M TB IFN-G BLD-IMP: NEGATIVE — SIGNIFICANT CHANGE UP
M TB IFN-G CD4+ BCKGRND COR BLD-ACNC: 0 IU/ML — SIGNIFICANT CHANGE UP
M TB IFN-G CD4+CD8+ BCKGRND COR BLD-ACNC: 0 IU/ML — SIGNIFICANT CHANGE UP
MANUAL SMEAR VERIFICATION: SIGNIFICANT CHANGE UP
MCHC RBC-ENTMCNC: 28.1 PG — SIGNIFICANT CHANGE UP (ref 27–34)
MCHC RBC-ENTMCNC: 32.8 % — SIGNIFICANT CHANGE UP (ref 32–36)
MCV RBC AUTO: 85.4 FL — SIGNIFICANT CHANGE UP (ref 80–100)
MONOCYTES # BLD AUTO: 1.22 K/UL — HIGH (ref 0–0.9)
MONOCYTES NFR BLD AUTO: 12.6 % — SIGNIFICANT CHANGE UP (ref 2–14)
NEUTROPHILS # BLD AUTO: 6.03 K/UL — SIGNIFICANT CHANGE UP (ref 1.8–7.4)
NEUTROPHILS NFR BLD AUTO: 62.4 % — SIGNIFICANT CHANGE UP (ref 43–77)
NRBC # FLD: 0 — SIGNIFICANT CHANGE UP
PLATELET # BLD AUTO: 204 K/UL — SIGNIFICANT CHANGE UP (ref 150–400)
PMV BLD: 9.3 FL — SIGNIFICANT CHANGE UP (ref 7–13)
POTASSIUM SERPL-MCNC: 3.5 MMOL/L — SIGNIFICANT CHANGE UP (ref 3.5–5.3)
POTASSIUM SERPL-SCNC: 3.5 MMOL/L — SIGNIFICANT CHANGE UP (ref 3.5–5.3)
PROT SERPL-MCNC: 5.3 G/DL — LOW (ref 6–8.3)
QUANT TB PLUS MITOGEN MINUS NIL: >10 IU/ML — SIGNIFICANT CHANGE UP
RBC # BLD: 4.67 M/UL — SIGNIFICANT CHANGE UP (ref 4.2–5.8)
RBC # FLD: 13.4 % — SIGNIFICANT CHANGE UP (ref 10.3–14.5)
SODIUM SERPL-SCNC: 135 MMOL/L — SIGNIFICANT CHANGE UP (ref 135–145)
SPECIMEN SOURCE: SIGNIFICANT CHANGE UP
WBC # BLD: 9.68 K/UL — SIGNIFICANT CHANGE UP (ref 3.8–10.5)
WBC # FLD AUTO: 9.68 K/UL — SIGNIFICANT CHANGE UP (ref 3.8–10.5)

## 2018-08-12 PROCEDURE — 99232 SBSQ HOSP IP/OBS MODERATE 35: CPT

## 2018-08-12 RX ORDER — MORPHINE SULFATE 50 MG/1
2 CAPSULE, EXTENDED RELEASE ORAL EVERY 6 HOURS
Qty: 0 | Refills: 0 | Status: DISCONTINUED | OUTPATIENT
Start: 2018-08-12 | End: 2018-08-16

## 2018-08-12 RX ORDER — CIPROFLOXACIN LACTATE 400MG/40ML
400 VIAL (ML) INTRAVENOUS ONCE
Qty: 0 | Refills: 0 | Status: COMPLETED | OUTPATIENT
Start: 2018-08-12 | End: 2018-08-12

## 2018-08-12 RX ORDER — CIPROFLOXACIN LACTATE 400MG/40ML
400 VIAL (ML) INTRAVENOUS EVERY 12 HOURS
Qty: 0 | Refills: 0 | Status: DISCONTINUED | OUTPATIENT
Start: 2018-08-12 | End: 2018-08-16

## 2018-08-12 RX ORDER — CIPROFLOXACIN LACTATE 400MG/40ML
VIAL (ML) INTRAVENOUS
Qty: 0 | Refills: 0 | Status: DISCONTINUED | OUTPATIENT
Start: 2018-08-12 | End: 2018-08-16

## 2018-08-12 RX ADMIN — Medication 4 GRAM(S): at 22:15

## 2018-08-12 RX ADMIN — MORPHINE SULFATE 2 MILLIGRAM(S): 50 CAPSULE, EXTENDED RELEASE ORAL at 12:45

## 2018-08-12 RX ADMIN — MORPHINE SULFATE 2 MILLIGRAM(S): 50 CAPSULE, EXTENDED RELEASE ORAL at 12:21

## 2018-08-12 RX ADMIN — Medication 1 MILLIGRAM(S): at 11:32

## 2018-08-12 RX ADMIN — Medication 500 MILLIGRAM(S): at 06:16

## 2018-08-12 RX ADMIN — MORPHINE SULFATE 2 MILLIGRAM(S): 50 CAPSULE, EXTENDED RELEASE ORAL at 18:15

## 2018-08-12 RX ADMIN — Medication 200 MILLIGRAM(S): at 11:31

## 2018-08-12 RX ADMIN — MORPHINE SULFATE 2 MILLIGRAM(S): 50 CAPSULE, EXTENDED RELEASE ORAL at 17:56

## 2018-08-12 RX ADMIN — ONDANSETRON 4 MILLIGRAM(S): 8 TABLET, FILM COATED ORAL at 08:53

## 2018-08-12 RX ADMIN — PANTOPRAZOLE SODIUM 40 MILLIGRAM(S): 20 TABLET, DELAYED RELEASE ORAL at 06:16

## 2018-08-12 RX ADMIN — Medication 200 MILLIGRAM(S): at 22:14

## 2018-08-12 RX ADMIN — SODIUM CHLORIDE 75 MILLILITER(S): 9 INJECTION INTRAMUSCULAR; INTRAVENOUS; SUBCUTANEOUS at 22:14

## 2018-08-12 RX ADMIN — MORPHINE SULFATE 2 MILLIGRAM(S): 50 CAPSULE, EXTENDED RELEASE ORAL at 06:15

## 2018-08-12 RX ADMIN — Medication 500 MILLIGRAM(S): at 17:30

## 2018-08-12 RX ADMIN — Medication 500 MILLIGRAM(S): at 11:31

## 2018-08-12 RX ADMIN — MORPHINE SULFATE 2 MILLIGRAM(S): 50 CAPSULE, EXTENDED RELEASE ORAL at 06:30

## 2018-08-12 RX ADMIN — Medication 500 MILLIGRAM(S): at 23:46

## 2018-08-12 NOTE — PROGRESS NOTE ADULT - SUBJECTIVE AND OBJECTIVE BOX
Patient is a 37y old  Male who presents with a chief complaint of "Indeterminate colitis flare" (09 Aug 2018 11:41)      SUBJECTIVE / OVERNIGHT EVENTS:    MEDICATIONS  (STANDING):  ciprofloxacin   IVPB      ciprofloxacin   IVPB 400 milliGRAM(s) IV Intermittent every 12 hours  folic acid 1 milliGRAM(s) Oral daily  mesalamine Enema 4 Gram(s) Rectal at bedtime  mesalamine ER Capsule 500 milliGRAM(s) Oral four times a day  pantoprazole    Tablet 40 milliGRAM(s) Oral before breakfast  sodium chloride 0.9%. 1000 milliLiter(s) (75 mL/Hr) IV Continuous <Continuous>    MEDICATIONS  (PRN):  acetaminophen   Tablet. 650 milliGRAM(s) Oral every 6 hours PRN Mild Pain (1 - 3)  artificial  tears Solution 1 Drop(s) Both EYES every 8 hours PRN Dry Eyes  morphine  - Injectable 2 milliGRAM(s) IV Push every 6 hours PRN Breakthrough pain  ondansetron Injectable 4 milliGRAM(s) IV Push every 6 hours PRN Nausea and/or Vomiting  oxyCODONE    IR 5 milliGRAM(s) Oral every 6 hours PRN Moderate to severe pain      Vital Signs Last 24 Hrs  T(C): 37.1 (12 Aug 2018 12:23), Max: 37.1 (12 Aug 2018 12:23)  T(F): 98.7 (12 Aug 2018 12:23), Max: 98.7 (12 Aug 2018 12:23)  HR: 64 (12 Aug 2018 12:23) (63 - 64)  BP: 122/57 (12 Aug 2018 12:23) (122/57 - 124/69)  BP(mean): --  RR: 18 (12 Aug 2018 12:23) (18 - 18)  SpO2: 97% (12 Aug 2018 12:23) (97% - 100%)  CAPILLARY BLOOD GLUCOSE        I&O's Summary      PHYSICAL EXAM:  GENERAL: NAD, well-developed  HEAD:  Atraumatic, Normocephalic  EYES: EOMI, PERRLA, conjunctiva and sclera clear  NECK: Supple, No JVD  CHEST/LUNG: Clear to auscultation bilaterally; No wheeze  HEART: Regular rate and rhythm; No murmurs, rubs, or gallops  ABDOMEN: Soft, Nontender, Nondistended; Bowel sounds present  EXTREMITIES:  2+ Peripheral Pulses, No clubbing, cyanosis, or edema  PSYCH: AAOx3  NEUROLOGY: non-focal  SKIN: No rashes or lesions    LABS:                        13.1   9.68  )-----------( 204      ( 12 Aug 2018 06:08 )             39.9     08-12    135  |  99  |  5<L>  ----------------------------<  91  3.5   |  21<L>  |  0.64    Ca    8.2<L>      12 Aug 2018 06:08    TPro  5.3<L>  /  Alb  2.9<L>  /  TBili  0.3  /  DBili  x   /  AST  10  /  ALT  7   /  AlkPhos  26<L>  08-12              RADIOLOGY & ADDITIONAL TESTS:    Imaging Personally Reviewed:    Consultant(s) Notes Reviewed:      Care Discussed with Consultants/Other Providers: Patient is a 37y old  Male who presents with a chief complaint of "Indeterminate colitis flare" (09 Aug 2018 11:41)      SUBJECTIVE / OVERNIGHT EVENTS: patient seen and examined by bedside at 11:05 Am, still c/o abdominal pain, nausea, vomiting, and diarrhea      MEDICATIONS  (STANDING):  ciprofloxacin   IVPB      ciprofloxacin   IVPB 400 milliGRAM(s) IV Intermittent every 12 hours  folic acid 1 milliGRAM(s) Oral daily  mesalamine Enema 4 Gram(s) Rectal at bedtime  mesalamine ER Capsule 500 milliGRAM(s) Oral four times a day  pantoprazole    Tablet 40 milliGRAM(s) Oral before breakfast  sodium chloride 0.9%. 1000 milliLiter(s) (75 mL/Hr) IV Continuous <Continuous>    MEDICATIONS  (PRN):  acetaminophen   Tablet. 650 milliGRAM(s) Oral every 6 hours PRN Mild Pain (1 - 3)  artificial  tears Solution 1 Drop(s) Both EYES every 8 hours PRN Dry Eyes  morphine  - Injectable 2 milliGRAM(s) IV Push every 6 hours PRN Breakthrough pain  ondansetron Injectable 4 milliGRAM(s) IV Push every 6 hours PRN Nausea and/or Vomiting  oxyCODONE    IR 5 milliGRAM(s) Oral every 6 hours PRN Moderate to severe pain      Vital Signs Last 24 Hrs  T(C): 37.1 (12 Aug 2018 12:23), Max: 37.1 (12 Aug 2018 12:23)  T(F): 98.7 (12 Aug 2018 12:23), Max: 98.7 (12 Aug 2018 12:23)  HR: 64 (12 Aug 2018 12:23) (63 - 64)  BP: 122/57 (12 Aug 2018 12:23) (122/57 - 124/69)  BP(mean): --  RR: 18 (12 Aug 2018 12:23) (18 - 18)  SpO2: 97% (12 Aug 2018 12:23) (97% - 100%)  CAPILLARY BLOOD GLUCOSE        I&O's Summary      PHYSICAL EXAM:  GENERAL: appears tired   HEAD:  Atraumatic, Normocephalic  EYES: EOMI, PERRLA, conjunctiva and sclera clear  NECK: Supple,  CHEST/LUNG: Clear to auscultation bilaterally; No wheeze  HEART: Regular rate and rhythm;   ABDOMEN: Soft, mild diffuse tenderness,  Nondistended; Bowel sounds present  EXTREMITIES:  2+ Peripheral Pulses, No clubbing, cyanosis, or edema  PSYCH: AAOx3  NEUROLOGY: non-focal  SKIN: No rashes or lesions  LABS:                        13.1   9.68  )-----------( 204      ( 12 Aug 2018 06:08 )             39.9     08-12    135  |  99  |  5<L>  ----------------------------<  91  3.5   |  21<L>  |  0.64    Ca    8.2<L>      12 Aug 2018 06:08    TPro  5.3<L>  /  Alb  2.9<L>  /  TBili  0.3  /  DBili  x   /  AST  10  /  ALT  7   /  AlkPhos  26<L>  08-12              RADIOLOGY & ADDITIONAL TESTS:    Imaging Personally Reviewed:    Consultant(s) Notes Reviewed:      Care Discussed with Consultants/Other Providers:

## 2018-08-12 NOTE — PROGRESS NOTE ADULT - PROBLEM SELECTOR PLAN 1
c/w mesalamine , holding steroids until infection ruled out   IV fluids  GI consult appreciated   C. Diff negative,, Stool PCR + for  ENTEROPATHOGENIC ECOLI (EPEC), will f/u GI recommendation, GI informed by KRISTAN Mccallum   f/u  Stool Culture, Fecal Calprotectin  ESR, CRP, , HBV Serologies noted   QFN pending   s/p   Flex sig on 8/10 f/u  pathology to r/o CMV c/w mesalamine , holding steroids until infection ruled out   IV fluids  GI following  C. Diff negative,, Stool PCR + for  ENTEROPATHOGENIC ECOLI (EPEC), will f/u GI recommendation, GI informed by KRISTAN Mccallum   f/u  Stool Culture, Fecal Calprotectin  ESR, CRP, , HBV Serologies noted   QFN pending   s/p   Flex sig on 8/10 f/u  pathology to r/o CMV

## 2018-08-13 DIAGNOSIS — R19.7 DIARRHEA, UNSPECIFIED: ICD-10-CM

## 2018-08-13 LAB
ALBUMIN SERPL ELPH-MCNC: 2.8 G/DL — LOW (ref 3.3–5)
ALP SERPL-CCNC: 26 U/L — LOW (ref 40–120)
ALT FLD-CCNC: 5 U/L — SIGNIFICANT CHANGE UP (ref 4–41)
AST SERPL-CCNC: 9 U/L — SIGNIFICANT CHANGE UP (ref 4–40)
BACTERIA STL CULT: SIGNIFICANT CHANGE UP
BASOPHILS # BLD AUTO: 0.06 K/UL — SIGNIFICANT CHANGE UP (ref 0–0.2)
BASOPHILS NFR BLD AUTO: 0.6 % — SIGNIFICANT CHANGE UP (ref 0–2)
BILIRUB SERPL-MCNC: 0.2 MG/DL — SIGNIFICANT CHANGE UP (ref 0.2–1.2)
BUN SERPL-MCNC: 3 MG/DL — LOW (ref 7–23)
CALCIUM SERPL-MCNC: 8 MG/DL — LOW (ref 8.4–10.5)
CHLORIDE SERPL-SCNC: 101 MMOL/L — SIGNIFICANT CHANGE UP (ref 98–107)
CO2 SERPL-SCNC: 19 MMOL/L — LOW (ref 22–31)
CREAT SERPL-MCNC: 0.62 MG/DL — SIGNIFICANT CHANGE UP (ref 0.5–1.3)
EOSINOPHIL # BLD AUTO: 0.77 K/UL — HIGH (ref 0–0.5)
EOSINOPHIL NFR BLD AUTO: 7.9 % — HIGH (ref 0–6)
GLUCOSE SERPL-MCNC: 96 MG/DL — SIGNIFICANT CHANGE UP (ref 70–99)
HCT VFR BLD CALC: 40.5 % — SIGNIFICANT CHANGE UP (ref 39–50)
HGB BLD-MCNC: 13.4 G/DL — SIGNIFICANT CHANGE UP (ref 13–17)
IMM GRANULOCYTES # BLD AUTO: 0.05 # — SIGNIFICANT CHANGE UP
IMM GRANULOCYTES NFR BLD AUTO: 0.5 % — SIGNIFICANT CHANGE UP (ref 0–1.5)
LYMPHOCYTES # BLD AUTO: 1.86 K/UL — SIGNIFICANT CHANGE UP (ref 1–3.3)
LYMPHOCYTES # BLD AUTO: 19.1 % — SIGNIFICANT CHANGE UP (ref 13–44)
MCHC RBC-ENTMCNC: 27.7 PG — SIGNIFICANT CHANGE UP (ref 27–34)
MCHC RBC-ENTMCNC: 33.1 % — SIGNIFICANT CHANGE UP (ref 32–36)
MCV RBC AUTO: 83.9 FL — SIGNIFICANT CHANGE UP (ref 80–100)
MONOCYTES # BLD AUTO: 1.34 K/UL — HIGH (ref 0–0.9)
MONOCYTES NFR BLD AUTO: 13.8 % — SIGNIFICANT CHANGE UP (ref 2–14)
NEUTROPHILS # BLD AUTO: 5.65 K/UL — SIGNIFICANT CHANGE UP (ref 1.8–7.4)
NEUTROPHILS NFR BLD AUTO: 58.1 % — SIGNIFICANT CHANGE UP (ref 43–77)
NRBC # FLD: 0 — SIGNIFICANT CHANGE UP
PLATELET # BLD AUTO: 205 K/UL — SIGNIFICANT CHANGE UP (ref 150–400)
PMV BLD: 9.5 FL — SIGNIFICANT CHANGE UP (ref 7–13)
POTASSIUM SERPL-MCNC: 3.5 MMOL/L — SIGNIFICANT CHANGE UP (ref 3.5–5.3)
POTASSIUM SERPL-SCNC: 3.5 MMOL/L — SIGNIFICANT CHANGE UP (ref 3.5–5.3)
PROT SERPL-MCNC: 5.3 G/DL — LOW (ref 6–8.3)
RBC # BLD: 4.83 M/UL — SIGNIFICANT CHANGE UP (ref 4.2–5.8)
RBC # FLD: 13.5 % — SIGNIFICANT CHANGE UP (ref 10.3–14.5)
SODIUM SERPL-SCNC: 135 MMOL/L — SIGNIFICANT CHANGE UP (ref 135–145)
WBC # BLD: 9.73 K/UL — SIGNIFICANT CHANGE UP (ref 3.8–10.5)
WBC # FLD AUTO: 9.73 K/UL — SIGNIFICANT CHANGE UP (ref 3.8–10.5)

## 2018-08-13 PROCEDURE — 99232 SBSQ HOSP IP/OBS MODERATE 35: CPT | Mod: GC

## 2018-08-13 RX ORDER — LACTOBACILLUS ACIDOPHILUS 100MM CELL
1 CAPSULE ORAL DAILY
Qty: 0 | Refills: 0 | Status: DISCONTINUED | OUTPATIENT
Start: 2018-08-13 | End: 2018-08-16

## 2018-08-13 RX ADMIN — Medication 1 TABLET(S): at 12:57

## 2018-08-13 RX ADMIN — Medication 500 MILLIGRAM(S): at 05:55

## 2018-08-13 RX ADMIN — MORPHINE SULFATE 2 MILLIGRAM(S): 50 CAPSULE, EXTENDED RELEASE ORAL at 19:40

## 2018-08-13 RX ADMIN — Medication 500 MILLIGRAM(S): at 23:03

## 2018-08-13 RX ADMIN — SODIUM CHLORIDE 75 MILLILITER(S): 9 INJECTION INTRAMUSCULAR; INTRAVENOUS; SUBCUTANEOUS at 22:54

## 2018-08-13 RX ADMIN — ONDANSETRON 4 MILLIGRAM(S): 8 TABLET, FILM COATED ORAL at 08:20

## 2018-08-13 RX ADMIN — MORPHINE SULFATE 2 MILLIGRAM(S): 50 CAPSULE, EXTENDED RELEASE ORAL at 12:57

## 2018-08-13 RX ADMIN — MORPHINE SULFATE 2 MILLIGRAM(S): 50 CAPSULE, EXTENDED RELEASE ORAL at 00:44

## 2018-08-13 RX ADMIN — Medication 500 MILLIGRAM(S): at 12:54

## 2018-08-13 RX ADMIN — ONDANSETRON 4 MILLIGRAM(S): 8 TABLET, FILM COATED ORAL at 22:55

## 2018-08-13 RX ADMIN — MORPHINE SULFATE 2 MILLIGRAM(S): 50 CAPSULE, EXTENDED RELEASE ORAL at 00:14

## 2018-08-13 RX ADMIN — Medication 200 MILLIGRAM(S): at 22:54

## 2018-08-13 RX ADMIN — MORPHINE SULFATE 2 MILLIGRAM(S): 50 CAPSULE, EXTENDED RELEASE ORAL at 07:07

## 2018-08-13 RX ADMIN — MORPHINE SULFATE 2 MILLIGRAM(S): 50 CAPSULE, EXTENDED RELEASE ORAL at 06:37

## 2018-08-13 RX ADMIN — Medication 500 MILLIGRAM(S): at 17:33

## 2018-08-13 RX ADMIN — MORPHINE SULFATE 2 MILLIGRAM(S): 50 CAPSULE, EXTENDED RELEASE ORAL at 13:15

## 2018-08-13 RX ADMIN — Medication 200 MILLIGRAM(S): at 12:55

## 2018-08-13 RX ADMIN — PANTOPRAZOLE SODIUM 40 MILLIGRAM(S): 20 TABLET, DELAYED RELEASE ORAL at 05:55

## 2018-08-13 RX ADMIN — Medication 1 MILLIGRAM(S): at 12:54

## 2018-08-13 RX ADMIN — ONDANSETRON 4 MILLIGRAM(S): 8 TABLET, FILM COATED ORAL at 14:18

## 2018-08-13 RX ADMIN — ONDANSETRON 4 MILLIGRAM(S): 8 TABLET, FILM COATED ORAL at 02:38

## 2018-08-13 RX ADMIN — MORPHINE SULFATE 2 MILLIGRAM(S): 50 CAPSULE, EXTENDED RELEASE ORAL at 19:21

## 2018-08-13 NOTE — PROGRESS NOTE ADULT - ASSESSMENT
38 y/o with history "indeterminate colitis" presents with diarrhea and weight loss 2/2 colitis flare with PCR studies positive for enteropathogenic Ecoli. 38 y/o with history UC (dx 20 years ago), also with likely Crohns disease (per [facility] reports) presents with worsening diarrhea and weight loss 2/2 infectious colitis flare with PCR studies positive for enteropathogenic Ecoli.

## 2018-08-13 NOTE — PROGRESS NOTE ADULT - SUBJECTIVE AND OBJECTIVE BOX
Patient is a 37y old  Male who presents with a chief complaint of "Indeterminate colitis flare" (09 Aug 2018 11:41)      SUBJECTIVE / OVERNIGHT EVENTS:    MEDICATIONS  (STANDING):  ciprofloxacin   IVPB      ciprofloxacin   IVPB 400 milliGRAM(s) IV Intermittent every 12 hours  folic acid 1 milliGRAM(s) Oral daily  lactobacillus acidophilus 1 Tablet(s) Oral daily  mesalamine Enema 4 Gram(s) Rectal at bedtime  mesalamine ER Capsule 500 milliGRAM(s) Oral four times a day  pantoprazole    Tablet 40 milliGRAM(s) Oral before breakfast  sodium chloride 0.9%. 1000 milliLiter(s) (75 mL/Hr) IV Continuous <Continuous>    MEDICATIONS  (PRN):  acetaminophen   Tablet. 650 milliGRAM(s) Oral every 6 hours PRN Mild Pain (1 - 3)  artificial  tears Solution 1 Drop(s) Both EYES every 8 hours PRN Dry Eyes  morphine  - Injectable 2 milliGRAM(s) IV Push every 6 hours PRN Severe Pain (7 - 10)  ondansetron Injectable 4 milliGRAM(s) IV Push every 6 hours PRN Nausea and/or Vomiting      I&O's Summary      Vital Signs Last 24 Hrs  T(C): 36.9 (13 Aug 2018 05:53), Max: 37.4 (12 Aug 2018 22:53)  T(F): 98.4 (13 Aug 2018 05:53), Max: 99.4 (12 Aug 2018 22:53)  HR: 66 (13 Aug 2018 06:51) (62 - 77)  BP: 110/63 (13 Aug 2018 06:51) (105/56 - 133/66)  BP(mean): --  RR: 17 (13 Aug 2018 05:53) (17 - 18)  SpO2: 96% (13 Aug 2018 05:53) (96% - 97%)  CAPILLARY BLOOD GLUCOSE          PHYSICAL EXAM:  GENERAL: NAD, well-developed  HEAD:  Atraumatic, Normocephalic  EYES: EOMI, PERRLA, conjunctiva and sclera clear  NECK: Supple, No JVD  CHEST/LUNG: Clear to auscultation bilaterally; No wheeze  HEART: Regular rate and rhythm; No murmurs, rubs, or gallops  ABDOMEN: Soft, Nontender, Nondistended; Bowel sounds present  EXTREMITIES:  2+ Peripheral Pulses, No clubbing, cyanosis, or edema  PSYCH: AAOx3  NEUROLOGY: non-focal  SKIN: No rashes or lesions    LABS:                        13.4   9.73  )-----------( 205      ( 13 Aug 2018 06:45 )             40.5     Auto Eosinophil # 0.77  / Auto Eosinophil % 7.9   / Auto Neutrophil # 5.65  / Auto Neutrophil % 58.1  / BANDS % x                            13.1   9.68  )-----------( 204      ( 12 Aug 2018 06:08 )             39.9     Auto Eosinophil # 0.69  / Auto Eosinophil % 7.1   / Auto Neutrophil # 6.03  / Auto Neutrophil % 62.4  / BANDS % x        08-13    135  |  101  |  3<L>  ----------------------------<  96  3.5   |  19<L>  |  0.62  08-12    135  |  99  |  5<L>  ----------------------------<  91  3.5   |  21<L>  |  0.64    Ca    8.0<L>      13 Aug 2018 06:45  TPro  5.3<L>  /  Alb  2.8<L>  /  TBili  0.2  /  DBili  x   /  AST  9   /  ALT  5   /  AlkPhos  26<L>  08-13  TPro  5.3<L>  /  Alb  2.9<L>  /  TBili  0.3  /  DBili  x   /  AST  10  /  ALT  7   /  AlkPhos  26<L>  08-12                RESPIRATORY  VENT:    ABG:     VBG:     RADIOLOGY & ADDITIONAL TESTS:    Imaging Personally Reviewed:    Consultant(s) Notes Reviewed:      Care Discussed with Consultants/Other Providers: Patient is a 37y old  Male who presents with a chief complaint of "Indeterminate colitis flare" (09 Aug 2018 11:41)      SUBJECTIVE / OVERNIGHT EVENTS: Pt seen and evaluated at bedside. Pt states his diarrhea has improved, overnight he went 3 times (watery, brown, nonbloody). Abd pain also improving. No fevers, chills, sob, chest pain, dysuria. Pt tolerating a clear liquid diet.     MEDICATIONS  (STANDING):  ciprofloxacin   IVPB      ciprofloxacin   IVPB 400 milliGRAM(s) IV Intermittent every 12 hours  folic acid 1 milliGRAM(s) Oral daily  lactobacillus acidophilus 1 Tablet(s) Oral daily  mesalamine Enema 4 Gram(s) Rectal at bedtime  mesalamine ER Capsule 500 milliGRAM(s) Oral four times a day  pantoprazole    Tablet 40 milliGRAM(s) Oral before breakfast  sodium chloride 0.9%. 1000 milliLiter(s) (75 mL/Hr) IV Continuous <Continuous>    MEDICATIONS  (PRN):  acetaminophen   Tablet. 650 milliGRAM(s) Oral every 6 hours PRN Mild Pain (1 - 3)  artificial  tears Solution 1 Drop(s) Both EYES every 8 hours PRN Dry Eyes  morphine  - Injectable 2 milliGRAM(s) IV Push every 6 hours PRN Severe Pain (7 - 10)  ondansetron Injectable 4 milliGRAM(s) IV Push every 6 hours PRN Nausea and/or Vomiting      I&O's Summary      Vital Signs Last 24 Hrs  T(C): 36.9 (13 Aug 2018 05:53), Max: 37.4 (12 Aug 2018 22:53)  T(F): 98.4 (13 Aug 2018 05:53), Max: 99.4 (12 Aug 2018 22:53)  HR: 66 (13 Aug 2018 06:51) (62 - 77)  BP: 110/63 (13 Aug 2018 06:51) (105/56 - 133/66)  BP(mean): --  RR: 17 (13 Aug 2018 05:53) (17 - 18)  SpO2: 96% (13 Aug 2018 05:53) (96% - 97%)  CAPILLARY BLOOD GLUCOSE          PHYSICAL EXAM:  GENERAL: NAD, well-developed  HEAD:  Atraumatic, Normocephalic  EYES: conjunctiva and sclera clear  NECK: Supple, No JVD  CHEST/LUNG: Clear to auscultation bilaterally; No wheeze  HEART: Regular rate and rhythm; No murmurs, rubs, or gallops  ABDOMEN: Soft, tender diffusely, hyperactive bowel sounds.   EXTREMITIES:  No clubbing, cyanosis, or edema  PSYCH: AAOx3  NEUROLOGY: non-focal  SKIN: No rashes or lesions    LABS:                        13.4   9.73  )-----------( 205      ( 13 Aug 2018 06:45 )             40.5     Auto Eosinophil # 0.77  / Auto Eosinophil % 7.9   / Auto Neutrophil # 5.65  / Auto Neutrophil % 58.1  / BANDS % x                            13.1   9.68  )-----------( 204      ( 12 Aug 2018 06:08 )             39.9     Auto Eosinophil # 0.69  / Auto Eosinophil % 7.1   / Auto Neutrophil # 6.03  / Auto Neutrophil % 62.4  / BANDS % x        08-13    135  |  101  |  3<L>  ----------------------------<  96  3.5   |  19<L>  |  0.62  08-12    135  |  99  |  5<L>  ----------------------------<  91  3.5   |  21<L>  |  0.64    Ca    8.0<L>      13 Aug 2018 06:45  TPro  5.3<L>  /  Alb  2.8<L>  /  TBili  0.2  /  DBili  x   /  AST  9   /  ALT  5   /  AlkPhos  26<L>  08-13  TPro  5.3<L>  /  Alb  2.9<L>  /  TBili  0.3  /  DBili  x   /  AST  10  /  ALT  7   /  AlkPhos  26<L>  08-12                RESPIRATORY  VENT:    ABG:     VBG:     RADIOLOGY & ADDITIONAL TESTS:    Imaging Personally Reviewed:    Consultant(s) Notes Reviewed:      Care Discussed with Consultants/Other Providers:

## 2018-08-13 NOTE — CHART NOTE - NSCHARTNOTEFT_GEN_A_CORE
Gi attending    Prelim path results from flex sig:       ACCESSION No:  [id number]    FREDY SCHROEDER                        1        Surgical Final Report          Final Diagnosis  1-Sigmoid colon, biopsy:  - Active colitis with features suggestive of chronicity, see  note.    Note: The clinical history of "inflammatory bowel disease" is  noted. The histologic features in this biopsy are compatible with  IBD with superimposed infection. No viral cytopathic changes,  pathogenic organisms, ova,  granulomas, or dysplasia identified. Immunostain for CMV will be  reported in an addendum.    Verified by: Ann-Marie Thompson M.D.  (Electronic Signature)  Reported on: 08/13/18 15:48 EDT,[address] [place] [address]  _________________________________________________________________    Clinical History  Colitis, flex sig, rule out CMV    Specimen(s) Submitted  1-Sigmoid colon    Gross Description  The specimen is received in formalin and the specimen container  is labeled: Sigmoid biopsy.  It  consists of two fragments of  soft, tan tissue each measuring 0.2 cm in maximum dimension.  Entirely submitted.  One cassette.    In addition to other data that may appear on the specimen  container, the label has been inspected to confirm the presence  of the patient's name and date of birth.  CRUZ 08/11/18 09:01

## 2018-08-13 NOTE — PROGRESS NOTE ADULT - SUBJECTIVE AND OBJECTIVE BOX
Chief Complaint:  Patient is a 37y old  Male who presents with a chief complaint of "Indeterminate colitis flare" (09 Aug 2018 11:41)    Interval Events:   - GI PCR on 8/10 positive for enteropathogenic E. coli   - Patient started on Ciprofloxacin 400 mg IV BID    Allergies:  Lialda (Hives)        [facility] Medications:  acetaminophen   Tablet. 650 milliGRAM(s) Oral every 6 hours PRN  artificial  tears Solution 1 Drop(s) Both EYES every 8 hours PRN  ciprofloxacin   IVPB      ciprofloxacin   IVPB 400 milliGRAM(s) IV Intermittent every 12 hours  folic acid 1 milliGRAM(s) Oral daily  mesalamine Enema 4 Gram(s) Rectal at bedtime  mesalamine ER Capsule 500 milliGRAM(s) Oral four times a day  morphine  - Injectable 2 milliGRAM(s) IV Push every 6 hours PRN  ondansetron Injectable 4 milliGRAM(s) IV Push every 6 hours PRN  pantoprazole    Tablet 40 milliGRAM(s) Oral before breakfast  sodium chloride 0.9%. 1000 milliLiter(s) IV Continuous <Continuous>      PMHX/PSHX:  Polyp of colon, unspecified part of colon, unspecified type  Condyloma  Herpes simplex type 2 infection  Herpes labialis  Ulcerative colitis  History of tracheostomy as a child  S/P knee surgery      Family history:  Family history of hypertension  Family history of cerebrovascular accident (Grandparent)  Family history of diabetes mellitus (Grandparent, Uncle)  No pertinent family history in first degree relatives  Family history of hypertension      ROS:     General:  No wt loss, fevers, chills, night sweats, fatigue,   Eyes:  Good vision, no reported pain  ENT:  No sore throat, pain, runny nose, dysphagia  CV:  No pain, palpitations, hypo/hypertension  Pulm:  No dyspnea, cough, tachypnea, wheezing  GI:  No pain, No nausea, No vomiting, No diarrhea, No constipation, No weight loss, No fever, No pruritis, No rectal bleeding, No tarry stools, No dysphagia,  :  No pain, bleeding, incontinence, nocturia  Muscle:  No pain, weakness  Neuro:  No weakness, tingling, memory problems  Psych:  No fatigue, insomnia, mood problems, depression  Endocrine:  No polyuria, polydipsia, cold/heat intolerance  Heme:  No petechiae, ecchymosis, easy bruisability  Skin:  No rash, tattoos, scars, edema      PHYSICAL EXAM:   Vital Signs:  Vital Signs Last 24 Hrs  T(C): 36.9 (13 Aug 2018 05:53), Max: 37.4 (12 Aug 2018 22:53)  T(F): 98.4 (13 Aug 2018 05:53), Max: 99.4 (12 Aug 2018 22:53)  HR: 66 (13 Aug 2018 06:51) (62 - 77)  BP: 110/63 (13 Aug 2018 06:51) (105/56 - 133/66)  BP(mean): --  RR: 17 (13 Aug 2018 05:53) (17 - 18)  SpO2: 96% (13 Aug 2018 05:53) (96% - 97%)  Daily     Daily     GENERAL:  No acute distress  HEENT:  Normocephalic/atraumtic,  no scleral icterus  CHEST:  Clear to auscultation bilaterally, no wheezes/rales/ronchi, no accessory muscle use  HEART:  Regular rate and rhythm, no murmurs/rubs/gallops  ABDOMEN:  Soft, non-tender, non-distended, normoactive bowel sounds, no masses, no hepato-splenomegaly, no signs of chronic liver disease  EXTREMITIES:  No cyanosis, clubbing, or edema  SKIN:  No rash/erythema/ecchymoses/petechiae/wounds/abscess/warm/dry  NEURO:  Alert and oriented x 3, no asterixis, no tremor    LABS:                        13.4   9.73  )-----------( 205      ( 13 Aug 2018 06:45 )             40.5     Mean Cell Volume: 83.9 fL (08-13-18 @ 06:45)    08-13    135  |  101  |  3<L>  ----------------------------<  96  3.5   |  19<L>  |  0.62    Ca    8.0<L>      13 Aug 2018 06:45    TPro  5.3<L>  /  Alb  2.8<L>  /  TBili  0.2  /  DBili  x   /  AST  9   /  ALT  5   /  AlkPhos  26<L>  08-13    LIVER FUNCTIONS - ( 13 Aug 2018 06:45 )  Alb: 2.8 g/dL / Pro: 5.3 g/dL / ALK PHOS: 26 u/L / ALT: 5 u/L / AST: 9 u/L / GGT: x                      13.4   9.73  )-----------( 205      ( 13 Aug 2018 06:45 )             40.5                         13.1   9.68  )-----------( 204      ( 12 Aug 2018 06:08 )             39.9                         13.0   8.63  )-----------( 194      ( 11 Aug 2018 06:51 )             39.7       Imaging:    < from: CT Abdomen and Pelvis w/ Oral Cont and w/ IV Cont (08.09.18 @ 06:50) >  FINDINGS:    LOWER CHEST: Within normal limits.    LIVER: Stable segment 7 flash filling hemangiomas.  BILE DUCTS: Normal caliber.  GALLBLADDER: Within normal limits.  SPLEEN: Within normal limits.  PANCREAS: Within normal limits.  ADRENALS: Within normal limits.  KIDNEYS/URETERS: Within normal limits.    BLADDER: Within normal limits.  REPRODUCTIVE ORGANS: The prostate is within normal limits.     BOWEL: Contiguous hyperemia and mild wall thickening of the colon from   rectum to cecum. Normal appendix. No bowel obstruction.   PERITONEUM: No ascites.  VESSELS:  Within normal limits.  RETROPERITONEUM: No lymphadenopathy.    ABDOMINAL WALL: Within normal limits.  BONES: Within normal limits.    IMPRESSION: Acute pan colonic ulcerative colitis flare.    < end of copied text > Chief Complaint:  Patient is a 37y old  Male who presents with a chief complaint of "Indeterminate colitis flare" (09 Aug 2018 11:41)    Interval Events:   - GI PCR on 8/10 positive for enteropathogenic E. coli   - Patient started on Ciprofloxacin 400 mg IV BID    Allergies:  Lialda (Hives)    [facility] Medications:  acetaminophen   Tablet. 650 milliGRAM(s) Oral every 6 hours PRN  artificial  tears Solution 1 Drop(s) Both EYES every 8 hours PRN  ciprofloxacin   IVPB      ciprofloxacin   IVPB 400 milliGRAM(s) IV Intermittent every 12 hours  folic acid 1 milliGRAM(s) Oral daily  mesalamine Enema 4 Gram(s) Rectal at bedtime  mesalamine ER Capsule 500 milliGRAM(s) Oral four times a day  morphine  - Injectable 2 milliGRAM(s) IV Push every 6 hours PRN  ondansetron Injectable 4 milliGRAM(s) IV Push every 6 hours PRN  pantoprazole    Tablet 40 milliGRAM(s) Oral before breakfast  sodium chloride 0.9%. 1000 milliLiter(s) IV Continuous <Continuous>    PMHX/PSHX:  Polyp of colon, unspecified part of colon, unspecified type  Condyloma  Herpes simplex type 2 infection  Herpes labialis  Ulcerative colitis  History of tracheostomy as a child  S/P knee surgery    Family history:  Family history of hypertension  Family history of cerebrovascular accident (Grandparent)  Family history of diabetes mellitus (Grandparent, Uncle)  No pertinent family history in first degree relatives  Family history of hypertension    Denies family history of colon cancer/polyps, stomach cancer/polyps, pancreatic cancer/masses, liver cancer/disease, ovarian cancer and endometrial cancer.    ROS:     General:  No wt loss, fevers, chills, night sweats, fatigue,   Eyes:  Good vision, no reported pain  ENT:  No sore throat, pain, runny nose, dysphagia  CV:  No pain, palpitations, hypo/hypertension  Pulm:  No dyspnea, cough, tachypnea, wheezing  GI:  + pain, No nausea, No vomiting, + diarrhea, No constipation, No weight loss, No fever, No pruritis, No rectal bleeding, No tarry stools, No dysphagia,  :  No pain, bleeding, incontinence, nocturia  Muscle:  No pain, weakness  Neuro:  No memory problems  Endocrine:  No polyuria, polydipsia, cold/heat intolerance  Heme:  No petechiae, ecchymosis, easy bruisability  Skin:  No rash, tattoos, scars, edema    PHYSICAL EXAM:   Vital Signs:  Vital Signs Last 24 Hrs  T(C): 36.9 (13 Aug 2018 05:53), Max: 37.4 (12 Aug 2018 22:53)  T(F): 98.4 (13 Aug 2018 05:53), Max: 99.4 (12 Aug 2018 22:53)  HR: 66 (13 Aug 2018 06:51) (62 - 77)  BP: 110/63 (13 Aug 2018 06:51) (105/56 - 133/66)  BP(mean): --  RR: 17 (13 Aug 2018 05:53) (17 - 18)  SpO2: 96% (13 Aug 2018 05:53) (96% - 97%)    GENERAL:  No acute distress  HEENT:  Normocephalic/atraumtic,  no scleral icterus  CHEST:  Clear to auscultation bilaterally, no wheezes/rales/ronchi, no accessory muscle use  HEART:  Regular rate and rhythm, no murmurs/rubs/gallops  ABDOMEN:  Soft, mild diffuse tenderness, non-distended, normoactive bowel sounds, no masses, no hepato-splenomegaly, no signs of chronic liver disease  EXTREMITIES:  No cyanosis, clubbing, or edema  SKIN:  No rash/erythema/ecchymoses/petechiae/wounds/abscess/warm/dry  NEURO:  Alert and oriented x 3, no asterixis, no tremor    LABS:                        13.4   9.73  )-----------( 205      ( 13 Aug 2018 06:45 )             40.5     Mean Cell Volume: 83.9 fL (08-13-18 @ 06:45)    08-13    135  |  101  |  3<L>  ----------------------------<  96  3.5   |  19<L>  |  0.62    Ca    8.0<L>      13 Aug 2018 06:45    TPro  5.3<L>  /  Alb  2.8<L>  /  TBili  0.2  /  DBili  x   /  AST  9   /  ALT  5   /  AlkPhos  26<L>  08-13    LIVER FUNCTIONS - ( 13 Aug 2018 06:45 )  Alb: 2.8 g/dL / Pro: 5.3 g/dL / ALK PHOS: 26 u/L / ALT: 5 u/L / AST: 9 u/L / GGT: x                      13.4   9.73  )-----------( 205      ( 13 Aug 2018 06:45 )             40.5                         13.1   9.68  )-----------( 204      ( 12 Aug 2018 06:08 )             39.9                         13.0   8.63  )-----------( 194      ( 11 Aug 2018 06:51 )             39.7       Imaging:    < from: CT Abdomen and Pelvis w/ Oral Cont and w/ IV Cont (08.09.18 @ 06:50) >  FINDINGS:    LOWER CHEST: Within normal limits.    LIVER: Stable segment 7 flash filling hemangiomas.  BILE DUCTS: Normal caliber.  GALLBLADDER: Within normal limits.  SPLEEN: Within normal limits.  PANCREAS: Within normal limits.  ADRENALS: Within normal limits.  KIDNEYS/URETERS: Within normal limits.    BLADDER: Within normal limits.  REPRODUCTIVE ORGANS: The prostate is within normal limits.     BOWEL: Contiguous hyperemia and mild wall thickening of the colon from   rectum to cecum. Normal appendix. No bowel obstruction.   PERITONEUM: No ascites.  VESSELS:  Within normal limits.  RETROPERITONEUM: No lymphadenopathy.    ABDOMINAL WALL: Within normal limits.  BONES: Within normal limits.    IMPRESSION: Acute pan colonic ulcerative colitis flare.    < end of copied text > Chief Complaint:  Patient is a 37y old  Male who presents with a chief complaint of "Indeterminate colitis flare" (09 Aug 2018 11:41)    Interval Events:   - GI PCR on 8/10 positive for enteropathogenic E. coli; PCR negative for C. difficile  - Patient started on Ciprofloxacin 400 mg IV BID    Allergies:  Lialda (Hives)    [facility] Medications:  acetaminophen   Tablet. 650 milliGRAM(s) Oral every 6 hours PRN  artificial  tears Solution 1 Drop(s) Both EYES every 8 hours PRN  ciprofloxacin   IVPB      ciprofloxacin   IVPB 400 milliGRAM(s) IV Intermittent every 12 hours  folic acid 1 milliGRAM(s) Oral daily  mesalamine Enema 4 Gram(s) Rectal at bedtime  mesalamine ER Capsule 500 milliGRAM(s) Oral four times a day  morphine  - Injectable 2 milliGRAM(s) IV Push every 6 hours PRN  ondansetron Injectable 4 milliGRAM(s) IV Push every 6 hours PRN  pantoprazole    Tablet 40 milliGRAM(s) Oral before breakfast  sodium chloride 0.9%. 1000 milliLiter(s) IV Continuous <Continuous>    PMHX/PSHX:  Polyp of colon, unspecified part of colon, unspecified type  Condyloma  Herpes simplex type 2 infection  Herpes labialis  Ulcerative colitis  History of tracheostomy as a child  S/P knee surgery    Family history:  Family history of hypertension  Family history of cerebrovascular accident (Grandparent)  Family history of diabetes mellitus (Grandparent, Uncle)  No pertinent family history in first degree relatives  Family history of hypertension    Denies family history of colon cancer/polyps, stomach cancer/polyps, pancreatic cancer/masses, liver cancer/disease, ovarian cancer and endometrial cancer.    ROS:     General:  No wt loss, fevers, chills, night sweats, fatigue,   Eyes:  Good vision, no reported pain  ENT:  No sore throat, pain, runny nose, dysphagia  CV:  No pain, palpitations, hypo/hypertension  Pulm:  No dyspnea, cough, tachypnea, wheezing  GI:  + pain, No nausea, No vomiting, + diarrhea, No constipation, No weight loss, No fever, No pruritis, No rectal bleeding, No tarry stools, No dysphagia,  :  No pain, bleeding, incontinence, nocturia  Muscle:  No pain, weakness  Neuro:  No memory problems  Endocrine:  No polyuria, polydipsia, cold/heat intolerance  Heme:  No petechiae, ecchymosis, easy bruisability  Skin:  No rash, tattoos, scars, edema    PHYSICAL EXAM:   Vital Signs:  Vital Signs Last 24 Hrs  T(C): 36.9 (13 Aug 2018 05:53), Max: 37.4 (12 Aug 2018 22:53)  T(F): 98.4 (13 Aug 2018 05:53), Max: 99.4 (12 Aug 2018 22:53)  HR: 66 (13 Aug 2018 06:51) (62 - 77)  BP: 110/63 (13 Aug 2018 06:51) (105/56 - 133/66)  BP(mean): --  RR: 17 (13 Aug 2018 05:53) (17 - 18)  SpO2: 96% (13 Aug 2018 05:53) (96% - 97%)    GENERAL:  No acute distress  HEENT:  Normocephalic/atraumtic,  no scleral icterus  CHEST:  Clear to auscultation bilaterally, no wheezes/rales/ronchi, no accessory muscle use  HEART:  Regular rate and rhythm, no murmurs/rubs/gallops  ABDOMEN:  Soft, mild diffuse tenderness, non-distended, normoactive bowel sounds, no masses, no hepato-splenomegaly, no signs of chronic liver disease  EXTREMITIES:  No cyanosis, clubbing, or edema  SKIN:  No rash/erythema/ecchymoses/petechiae/wounds/abscess/warm/dry  NEURO:  Alert and oriented x 3, no asterixis, no tremor    LABS:                        13.4   9.73  )-----------( 205      ( 13 Aug 2018 06:45 )             40.5     Mean Cell Volume: 83.9 fL (08-13-18 @ 06:45)    08-13    135  |  101  |  3<L>  ----------------------------<  96  3.5   |  19<L>  |  0.62    Ca    8.0<L>      13 Aug 2018 06:45    TPro  5.3<L>  /  Alb  2.8<L>  /  TBili  0.2  /  DBili  x   /  AST  9   /  ALT  5   /  AlkPhos  26<L>  08-13    LIVER FUNCTIONS - ( 13 Aug 2018 06:45 )  Alb: 2.8 g/dL / Pro: 5.3 g/dL / ALK PHOS: 26 u/L / ALT: 5 u/L / AST: 9 u/L / GGT: x                      13.4   9.73  )-----------( 205      ( 13 Aug 2018 06:45 )             40.5                         13.1   9.68  )-----------( 204      ( 12 Aug 2018 06:08 )             39.9                         13.0   8.63  )-----------( 194      ( 11 Aug 2018 06:51 )             39.7       Imaging:    < from: CT Abdomen and Pelvis w/ Oral Cont and w/ IV Cont (08.09.18 @ 06:50) >  FINDINGS:    LOWER CHEST: Within normal limits.    LIVER: Stable segment 7 flash filling hemangiomas.  BILE DUCTS: Normal caliber.  GALLBLADDER: Within normal limits.  SPLEEN: Within normal limits.  PANCREAS: Within normal limits.  ADRENALS: Within normal limits.  KIDNEYS/URETERS: Within normal limits.    BLADDER: Within normal limits.  REPRODUCTIVE ORGANS: The prostate is within normal limits.     BOWEL: Contiguous hyperemia and mild wall thickening of the colon from   rectum to cecum. Normal appendix. No bowel obstruction.   PERITONEUM: No ascites.  VESSELS:  Within normal limits.  RETROPERITONEUM: No lymphadenopathy.    ABDOMINAL WALL: Within normal limits.  BONES: Within normal limits.    IMPRESSION: Acute pan colonic ulcerative colitis flare.    < end of copied text >

## 2018-08-13 NOTE — PROGRESS NOTE ADULT - PROBLEM SELECTOR PLAN 1
Infectious vs. inflammatory. C. Diff negative,, Stool PCR + for  ENTEROPATHOGENIC ECOLI (EPEC), started on cipro Day 2 today.   -c/w mesalamine, holding steroids in setting of Ecoli infection.   -continue with IV fluids  -f/up GI recs  -f/u  Stool O/P and viral PCR  -s/p Flex sig on 8/10 f/u  pathology to r/o CMV  -pain control with morphine Pt with underyling inflammatory colitis now with infectious etiology. C. Diff negative,, Stool PCR + for  ENTEROPATHOGENIC ECOLI (EPEC), started on cipro Day 2 today.   -c/w mesalamine, holding steroids in setting of Ecoli infection.   -continue with IV fluids  -f/up GI recs  -f/u  Stool O/P and viral PCR  -s/p Flex sig on 8/10 f/u  pathology to r/o CMV  -pain control with morphine

## 2018-08-13 NOTE — PROGRESS NOTE ADULT - ASSESSMENT
Impression:  1. Colitis: Differential diagnosis includes inflammatory bowel disease, specifically indeterminate colitis flare verus infectious colitis. GI PCR (8/10) positive for enteropathogenic E. coli. Stool culture (8/11) with no growth. Clostridium difficile (8/9) PCR negative. Flexible sigmoidoscopy on 8/10 with poop prep, however, showing erythematous changes concerning for colitis. Biopsies taken to rule out CMV and are currently pending. ESR 7 and CRP 5.5 on 8/10.    Recommendations:  - Diet as tolerated  - F/U biopsy results  - Continue ciprofloxacin 400 mg IV BID  - OSH records from [place] (primary team to obtain)    Milo Gunter MD  Gastroenterology Fellow  Pager number: [phone] / [phone]    Please page on call fellow on weekends and after 5pm on weekdays Impression:  1. Pancolitis: Differential diagnosis includes inflammatory bowel disease, specifically indeterminate colitis flare verus infectious colitis. GI PCR (8/10) positive for enteropathogenic E. coli. Stool culture (8/11) with no growth. Clostridium difficile (8/9) PCR negative. Flexible sigmoidoscopy on 8/10 with poop prep, however, showing erythematous changes concerning for colitis. Biopsies taken to rule out CMV and are currently pending. ESR 7 and CRP 5.5 on 8/10.    Recommendations:  - Diet as tolerated  - F/U biopsy results from flexible sigmoidoscopy  - Continue ciprofloxacin 400 mg IV BID  - OSH records from [place] (primary team to obtain)    Milo Gunter MD  Gastroenterology Fellow  Pager number: [phone] / [phone]    Please page on call fellow on weekends and after 5pm on weekdays Impression:  1. Pancolitis with new onset of infectious colitis. GI PCR (8/10) positive for enteropathogenic E. coli.   2) colitis noted on flex sigmoidoscopy  Recommendations:  - Diet as tolerated  - F/U biopsy results from flexible sigmoidoscopy  - Continue ciprofloxacin 400 mg IV BID 3-5 days  - OSH records from [place] (primary team to obtain)  - fecal calprotectin pending    Milo Gunter MD  Gastroenterology Fellow  Pager number: [phone] / [phone]    Please page on call fellow on weekends and after 5pm on weekdays

## 2018-08-14 DIAGNOSIS — E87.6 HYPOKALEMIA: ICD-10-CM

## 2018-08-14 DIAGNOSIS — Z71.89 OTHER SPECIFIED COUNSELING: ICD-10-CM

## 2018-08-14 LAB
ALBUMIN SERPL ELPH-MCNC: 2.8 G/DL — LOW (ref 3.3–5)
ALP SERPL-CCNC: 24 U/L — LOW (ref 40–120)
ALT FLD-CCNC: 6 U/L — SIGNIFICANT CHANGE UP (ref 4–41)
AST SERPL-CCNC: 9 U/L — SIGNIFICANT CHANGE UP (ref 4–40)
BASOPHILS # BLD AUTO: 0.07 K/UL — SIGNIFICANT CHANGE UP (ref 0–0.2)
BASOPHILS NFR BLD AUTO: 0.7 % — SIGNIFICANT CHANGE UP (ref 0–2)
BILIRUB SERPL-MCNC: 0.2 MG/DL — SIGNIFICANT CHANGE UP (ref 0.2–1.2)
BUN SERPL-MCNC: 3 MG/DL — LOW (ref 7–23)
CALCIUM SERPL-MCNC: 8 MG/DL — LOW (ref 8.4–10.5)
CHLORIDE SERPL-SCNC: 98 MMOL/L — SIGNIFICANT CHANGE UP (ref 98–107)
CO2 SERPL-SCNC: 22 MMOL/L — SIGNIFICANT CHANGE UP (ref 22–31)
CREAT SERPL-MCNC: 0.63 MG/DL — SIGNIFICANT CHANGE UP (ref 0.5–1.3)
EOSINOPHIL # BLD AUTO: 0.72 K/UL — HIGH (ref 0–0.5)
EOSINOPHIL NFR BLD AUTO: 7 % — HIGH (ref 0–6)
GLUCOSE SERPL-MCNC: 111 MG/DL — HIGH (ref 70–99)
HCT VFR BLD CALC: 41 % — SIGNIFICANT CHANGE UP (ref 39–50)
HGB BLD-MCNC: 13.6 G/DL — SIGNIFICANT CHANGE UP (ref 13–17)
IMM GRANULOCYTES # BLD AUTO: 0.06 # — SIGNIFICANT CHANGE UP
IMM GRANULOCYTES NFR BLD AUTO: 0.6 % — SIGNIFICANT CHANGE UP (ref 0–1.5)
LYMPHOCYTES # BLD AUTO: 1.93 K/UL — SIGNIFICANT CHANGE UP (ref 1–3.3)
LYMPHOCYTES # BLD AUTO: 18.8 % — SIGNIFICANT CHANGE UP (ref 13–44)
MCHC RBC-ENTMCNC: 28.2 PG — SIGNIFICANT CHANGE UP (ref 27–34)
MCHC RBC-ENTMCNC: 33.2 % — SIGNIFICANT CHANGE UP (ref 32–36)
MCV RBC AUTO: 84.9 FL — SIGNIFICANT CHANGE UP (ref 80–100)
MONOCYTES # BLD AUTO: 1.33 K/UL — HIGH (ref 0–0.9)
MONOCYTES NFR BLD AUTO: 13 % — SIGNIFICANT CHANGE UP (ref 2–14)
NEUTROPHILS # BLD AUTO: 6.16 K/UL — SIGNIFICANT CHANGE UP (ref 1.8–7.4)
NEUTROPHILS NFR BLD AUTO: 59.9 % — SIGNIFICANT CHANGE UP (ref 43–77)
NRBC # FLD: 0 — SIGNIFICANT CHANGE UP
O+P SPEC CONC: SIGNIFICANT CHANGE UP
PLATELET # BLD AUTO: 243 K/UL — SIGNIFICANT CHANGE UP (ref 150–400)
PMV BLD: 9.7 FL — SIGNIFICANT CHANGE UP (ref 7–13)
POTASSIUM SERPL-MCNC: 3.1 MMOL/L — LOW (ref 3.5–5.3)
POTASSIUM SERPL-SCNC: 3.1 MMOL/L — LOW (ref 3.5–5.3)
PROT SERPL-MCNC: 5.1 G/DL — LOW (ref 6–8.3)
RBC # BLD: 4.83 M/UL — SIGNIFICANT CHANGE UP (ref 4.2–5.8)
RBC # FLD: 13.6 % — SIGNIFICANT CHANGE UP (ref 10.3–14.5)
SODIUM SERPL-SCNC: 133 MMOL/L — LOW (ref 135–145)
SPECIMEN SOURCE: SIGNIFICANT CHANGE UP
TRI STN SPEC: SIGNIFICANT CHANGE UP
WBC # BLD: 10.27 K/UL — SIGNIFICANT CHANGE UP (ref 3.8–10.5)
WBC # FLD AUTO: 10.27 K/UL — SIGNIFICANT CHANGE UP (ref 3.8–10.5)

## 2018-08-14 PROCEDURE — 99232 SBSQ HOSP IP/OBS MODERATE 35: CPT | Mod: GC

## 2018-08-14 RX ORDER — POTASSIUM CHLORIDE 20 MEQ
40 PACKET (EA) ORAL ONCE
Qty: 0 | Refills: 0 | Status: COMPLETED | OUTPATIENT
Start: 2018-08-14 | End: 2018-08-14

## 2018-08-14 RX ADMIN — Medication 500 MILLIGRAM(S): at 05:30

## 2018-08-14 RX ADMIN — Medication 200 MILLIGRAM(S): at 23:14

## 2018-08-14 RX ADMIN — Medication 200 MILLIGRAM(S): at 11:53

## 2018-08-14 RX ADMIN — ONDANSETRON 4 MILLIGRAM(S): 8 TABLET, FILM COATED ORAL at 11:04

## 2018-08-14 RX ADMIN — MORPHINE SULFATE 2 MILLIGRAM(S): 50 CAPSULE, EXTENDED RELEASE ORAL at 08:53

## 2018-08-14 RX ADMIN — MORPHINE SULFATE 2 MILLIGRAM(S): 50 CAPSULE, EXTENDED RELEASE ORAL at 16:15

## 2018-08-14 RX ADMIN — SODIUM CHLORIDE 75 MILLILITER(S): 9 INJECTION INTRAMUSCULAR; INTRAVENOUS; SUBCUTANEOUS at 23:14

## 2018-08-14 RX ADMIN — MORPHINE SULFATE 2 MILLIGRAM(S): 50 CAPSULE, EXTENDED RELEASE ORAL at 22:25

## 2018-08-14 RX ADMIN — MORPHINE SULFATE 2 MILLIGRAM(S): 50 CAPSULE, EXTENDED RELEASE ORAL at 22:06

## 2018-08-14 RX ADMIN — Medication 500 MILLIGRAM(S): at 18:02

## 2018-08-14 RX ADMIN — Medication 500 MILLIGRAM(S): at 11:53

## 2018-08-14 RX ADMIN — Medication 1 MILLIGRAM(S): at 11:53

## 2018-08-14 RX ADMIN — PANTOPRAZOLE SODIUM 40 MILLIGRAM(S): 20 TABLET, DELAYED RELEASE ORAL at 05:30

## 2018-08-14 RX ADMIN — MORPHINE SULFATE 2 MILLIGRAM(S): 50 CAPSULE, EXTENDED RELEASE ORAL at 16:02

## 2018-08-14 RX ADMIN — MORPHINE SULFATE 2 MILLIGRAM(S): 50 CAPSULE, EXTENDED RELEASE ORAL at 02:20

## 2018-08-14 RX ADMIN — Medication 40 MILLIEQUIVALENT(S): at 18:02

## 2018-08-14 RX ADMIN — MORPHINE SULFATE 2 MILLIGRAM(S): 50 CAPSULE, EXTENDED RELEASE ORAL at 02:02

## 2018-08-14 RX ADMIN — SODIUM CHLORIDE 75 MILLILITER(S): 9 INJECTION INTRAMUSCULAR; INTRAVENOUS; SUBCUTANEOUS at 05:30

## 2018-08-14 RX ADMIN — MORPHINE SULFATE 2 MILLIGRAM(S): 50 CAPSULE, EXTENDED RELEASE ORAL at 09:08

## 2018-08-14 RX ADMIN — Medication 1 TABLET(S): at 11:56

## 2018-08-14 RX ADMIN — Medication 500 MILLIGRAM(S): at 23:14

## 2018-08-14 NOTE — PROGRESS NOTE ADULT - SUBJECTIVE AND OBJECTIVE BOX
Chief Complaint:  Patient is a 37y old  Male who presents with a chief complaint of "Indeterminate colitis flare" (09 Aug 2018 11:41)    Interval Events:   - Patient endorses continued improvement in diarrhea - had 5 episodes of non-bloody brown colored diarrhea over the past 12 hours. Endorses continued abdominal pain which is controlled on current pain regimen.  - Biopsy showing active colitis with features suggestive of chronicity - full report below    Allergies:  [facility] (Hives)    Hospital Medications:  acetaminophen   Tablet. 650 milliGRAM(s) Oral every 6 hours PRN  artificial  tears Solution 1 Drop(s) Both EYES every 8 hours PRN  ciprofloxacin   IVPB      ciprofloxacin   IVPB 400 milliGRAM(s) IV Intermittent every 12 hours  folic acid 1 milliGRAM(s) Oral daily  lactobacillus acidophilus 1 Tablet(s) Oral daily  mesalamine Enema 4 Gram(s) Rectal at bedtime  mesalamine ER Capsule 500 milliGRAM(s) Oral four times a day  morphine  - Injectable 2 milliGRAM(s) IV Push every 6 hours PRN  ondansetron Injectable 4 milliGRAM(s) IV Push every 6 hours PRN  pantoprazole    Tablet 40 milliGRAM(s) Oral before breakfast  potassium chloride    Tablet ER 40 milliEquivalent(s) Oral once  sodium chloride 0.9%. 1000 milliLiter(s) IV Continuous <Continuous>    PMHX/PSHX:  Polyp of colon, unspecified part of colon, unspecified type  Condyloma  Herpes simplex type 2 infection  Herpes labialis  Ulcerative colitis  History of tracheostomy as a child  S/P knee surgery    Family history:  Family history of hypertension  Family history of cerebrovascular accident (Grandparent)  Family history of diabetes mellitus (Grandparent, Uncle)  No pertinent family history in first degree relatives  Family history of hypertension    Denies family history of colon cancer/polyps, stomach cancer/polyps, pancreatic cancer/masses, liver cancer/disease, ovarian cancer and endometrial cancer.    ROS:     General:  No wt loss, fevers, chills, night sweats, fatigue,   Eyes:  Good vision, no reported pain  ENT:  No sore throat, pain, runny nose, dysphagia  CV:  No pain, palpitations, hypo/hypertension  Pulm:  No dyspnea, cough, tachypnea, wheezing  GI:  + pain, No nausea, No vomiting, + diarrhea, No constipation, No weight loss, No fever, No pruritis, No rectal bleeding, No tarry stools, No dysphagia,  :  No pain, bleeding, incontinence, nocturia  Muscle:  No pain, weakness  Neuro:  No memory problems  Endocrine:  No polyuria, polydipsia, cold/heat intolerance  Heme:  No petechiae, ecchymosis, easy bruisability  Skin:  No rash, tattoos, scars, edema    PHYSICAL EXAM:   Vital Signs:  Vital Signs Last 24 Hrs  T(C): 37.3 (14 Aug 2018 05:29), Max: 37.3 (14 Aug 2018 05:29)  T(F): 99.1 (14 Aug 2018 05:29), Max: 99.1 (14 Aug 2018 05:29)  HR: 72 (14 Aug 2018 05:29) (69 - 72)  BP: 104/55 (14 Aug 2018 05:29) (104/55 - 123/62)  BP(mean): --  RR: 17 (14 Aug 2018 05:29) (17 - 17)  SpO2: 99% (14 Aug 2018 05:29) (96% - 99%)     GENERAL:  No acute distress  HEENT:  Normocephalic, atraumatic  no scleral icterus  CHEST:  Clear to auscultation bilaterally, no wheezes/rales/ronchi, no accessory muscle use  HEART:  Regular rate and rhythm, no murmurs/rubs/gallops  ABDOMEN:  Soft, mild diffuse tenderness, non-distended, normoactive bowel sounds, no masses, no hepato-splenomegaly, no signs of chronic liver disease  EXTREMITIES:  No cyanosis, clubbing, or edema  SKIN:  No rash/erythema/ecchymoses/petechiae/wounds/abscess/warm/dry  NEURO:  Alert and oriented x 3, no asterixis, no tremor    LABS:                        13.6   10.27 )-----------( 243      ( 14 Aug 2018 06:30 )             41.0     Mean Cell Volume: 84.9 fL (08-14-18 @ 06:30)    08-14    133<L>  |  98  |  3<L>  ----------------------------<  111<H>  3.1<L>   |  22  |  0.63    Ca    8.0<L>      14 Aug 2018 06:30    TPro  5.1<L>  /  Alb  2.8<L>  /  TBili  0.2  /  DBili  x   /  AST  9   /  ALT  6   /  AlkPhos  24<L>  08-14    LIVER FUNCTIONS - ( 14 Aug 2018 06:30 )  Alb: 2.8 g/dL / Pro: 5.1 g/dL / ALK PHOS: 24 u/L / ALT: 6 u/L / AST: 9 u/L / GGT: x                               13.6   10.27 )-----------( 243      ( 14 Aug 2018 06:30 )             41.0                         13.4   9.73  )-----------( 205      ( 13 Aug 2018 06:45 )             40.5                         13.1   9.68  )-----------( 204      ( 12 Aug 2018 06:08 )             39.9     Imaging:    No new imaging    Pathology:    Surgical Pathology Report (08.10.18 @ 18:00)    Surgical Pathology Report:   ACCESSION No:  [id number]    FREDY SCHROEDER                        1    Surgical Final Report    Final Diagnosis  1-Sigmoid colon, biopsy:  - Active colitis with features suggestive of chronicity, see  note.    Note: The clinical history of "inflammatory bowel disease" is  noted. The histologic features in this biopsy are compatible with  IBD with superimposed infection. No viral cytopathic changes,  pathogenic organisms, ova,  granulomas, or dysplasia identified. Immunostain for CMV will be  reported in an addendum.    Verified by: Ann-Marie Thompson M.D.  (Electronic Signature)  Reported on: 08/13/18 15:48 EDT,[address] [place] [address]  [id number]  _________________________________________________________________    Clinical History  Colitis, flex sig, rule out CMV    Specimen(s) Submitted  1-Sigmoid colon    Gross Description  The specimen is received in formalin and the specimen container  is labeled: Sigmoid biopsy.  It  consists of two fragments of  soft, tan tissue each measuring 0.2 cm in maximum dimension.  Entirely submitted.  One cassette.    In addition to other data that may appear on the specimen  container, the label has been inspected to confirm the presence  of the patient's name and date of birth.  DA 08/11/18 09:01 Chief Complaint:  Patient is a 37y old  Male who presents with a chief complaint of "Indeterminate colitis flare" (09 Aug 2018 11:41)    Interval Events:   - Patient endorses continued improvement in diarrhea - had 5 episodes of non-bloody brown colored diarrhea over the past 12 hours. Endorses continued abdominal pain which is controlled on current pain regimen iwth morphine.  - Biopsy showing active colitis with features suggestive of chronicity - full report below  - Treatment continuing for E. coli colitis    Allergies:  [facility] (Hives)    Hospital Medications:  acetaminophen   Tablet. 650 milliGRAM(s) Oral every 6 hours PRN  artificial  tears Solution 1 Drop(s) Both EYES every 8 hours PRN  ciprofloxacin   IVPB      ciprofloxacin   IVPB 400 milliGRAM(s) IV Intermittent every 12 hours  folic acid 1 milliGRAM(s) Oral daily  lactobacillus acidophilus 1 Tablet(s) Oral daily  mesalamine Enema 4 Gram(s) Rectal at bedtime  mesalamine ER Capsule 500 milliGRAM(s) Oral four times a day  morphine  - Injectable 2 milliGRAM(s) IV Push every 6 hours PRN  ondansetron Injectable 4 milliGRAM(s) IV Push every 6 hours PRN  pantoprazole    Tablet 40 milliGRAM(s) Oral before breakfast  potassium chloride    Tablet ER 40 milliEquivalent(s) Oral once  sodium chloride 0.9%. 1000 milliLiter(s) IV Continuous <Continuous>    PMHX/PSHX:  Polyp of colon, unspecified part of colon, unspecified type  Condyloma  Herpes simplex type 2 infection  Herpes labialis  Ulcerative colitis  History of tracheostomy as a child  S/P knee surgery    Family history:  Family history of hypertension  Family history of cerebrovascular accident (Grandparent)  Family history of diabetes mellitus (Grandparent, Uncle)  No pertinent family history in first degree relatives  Family history of hypertension    Denies family history of colon cancer/polyps, stomach cancer/polyps, pancreatic cancer/masses, liver cancer/disease, ovarian cancer and endometrial cancer.    ROS:     General:  No wt loss, fevers, chills, night sweats, fatigue,   Eyes:  Good vision, no reported pain  ENT:  No sore throat, pain, runny nose, dysphagia  CV:  No pain, palpitations, hypo/hypertension  Pulm:  No dyspnea, cough, tachypnea, wheezing  GI:  + pain, No nausea, No vomiting, + diarrhea, No constipation, No weight loss, No fever, No pruritis, No rectal bleeding, No tarry stools, No dysphagia,  :  No pain, bleeding, incontinence, nocturia  Muscle:  No pain, weakness  Neuro:  No memory problems  Endocrine:  No polyuria, polydipsia, cold/heat intolerance  Heme:  No petechiae, ecchymosis, easy bruisability  Skin:  No rash, tattoos, scars, edema    PHYSICAL EXAM:   Vital Signs:  Vital Signs Last 24 Hrs  T(C): 37.3 (14 Aug 2018 05:29), Max: 37.3 (14 Aug 2018 05:29)  T(F): 99.1 (14 Aug 2018 05:29), Max: 99.1 (14 Aug 2018 05:29)  HR: 72 (14 Aug 2018 05:29) (69 - 72)  BP: 104/55 (14 Aug 2018 05:29) (104/55 - 123/62)  BP(mean): --  RR: 17 (14 Aug 2018 05:29) (17 - 17)  SpO2: 99% (14 Aug 2018 05:29) (96% - 99%)     GENERAL:  No acute distress  HEENT:  Normocephalic, atraumatic  no scleral icterus  CHEST:  Clear to auscultation bilaterally, no wheezes/rales/ronchi, no accessory muscle use  HEART:  Regular rate and rhythm, no murmurs/rubs/gallops  ABDOMEN:  Soft, mild diffuse tenderness, non-distended, normoactive bowel sounds, no masses, no hepato-splenomegaly, no signs of chronic liver disease  EXTREMITIES:  No cyanosis, clubbing, or edema  SKIN:  No rash/erythema/ecchymoses/petechiae/wounds/abscess/warm/dry  NEURO:  Alert and oriented x 3, no asterixis, no tremor    LABS:                        13.6   10.27 )-----------( 243      ( 14 Aug 2018 06:30 )             41.0     Mean Cell Volume: 84.9 fL (08-14-18 @ 06:30)    08-14    133<L>  |  98  |  3<L>  ----------------------------<  111<H>  3.1<L>   |  22  |  0.63    Ca    8.0<L>      14 Aug 2018 06:30    TPro  5.1<L>  /  Alb  2.8<L>  /  TBili  0.2  /  DBili  x   /  AST  9   /  ALT  6   /  AlkPhos  24<L>  08-14    LIVER FUNCTIONS - ( 14 Aug 2018 06:30 )  Alb: 2.8 g/dL / Pro: 5.1 g/dL / ALK PHOS: 24 u/L / ALT: 6 u/L / AST: 9 u/L / GGT: x                               13.6   10.27 )-----------( 243      ( 14 Aug 2018 06:30 )             41.0                         13.4   9.73  )-----------( 205      ( 13 Aug 2018 06:45 )             40.5                         13.1   9.68  )-----------( 204      ( 12 Aug 2018 06:08 )             39.9     Imaging:    No new imaging    Pathology:    Surgical Pathology Report (08.10.18 @ 18:00)    Surgical Pathology Report:   ACCESSION No:  [id number]    FREDY SCHROEDER                        1    Surgical Final Report    Final Diagnosis  1-Sigmoid colon, biopsy:  - Active colitis with features suggestive of chronicity, see  note.    Note: The clinical history of "inflammatory bowel disease" is  noted. The histologic features in this biopsy are compatible with  IBD with superimposed infection. No viral cytopathic changes,  pathogenic organisms, ova,  granulomas, or dysplasia identified. Immunostain for CMV will be  reported in an addendum.    Verified by: Ann-Marie Thompson M.D.  (Electronic Signature)  Reported on: 08/13/18 15:48 EDT,[address] [place] [address]  [id number]  _________________________________________________________________    Clinical History  Colitis, flex sig, rule out CMV    Specimen(s) Submitted  1-Sigmoid colon    Gross Description  The specimen is received in formalin and the specimen container  is labeled: Sigmoid biopsy.  It  consists of two fragments of  soft, tan tissue each measuring 0.2 cm in maximum dimension.  Entirely submitted.  One cassette.    In addition to other data that may appear on the specimen  container, the label has been inspected to confirm the presence  of the patient's name and date of birth.  CRUZ 08/11/18 09:01

## 2018-08-14 NOTE — PROGRESS NOTE ADULT - PROBLEM SELECTOR PLAN 1
Pt with underyling inflammatory colitis now with infectious etiology. Stool PCR + for  ENTEROPATHOGENIC ECOLI (EPEC),   -continue with cipro Day 3 today.   -c/w mesalamine, holding steroids in setting of Ecoli infection.   -continue with IV fluids  -f/up GI recs  -stool O/P negative. Viral culture pending.   -s/p Flex sig on 8/10  showed "Active colitis with features suggestive of chronicity"   -CMV pendingl.   -pain control with morphine

## 2018-08-14 NOTE — PROGRESS NOTE ADULT - ASSESSMENT
Impression:  1. Pancolitis with new onset of infectious colitis: GI PCR (8/10) positive for enteropathogenic E. coli.    Recommendations:  - Diet as tolerated  - Continue ciprofloxacin 400 mg IV BID 3-5 days  - OSH records from [place] (primary team to obtain)  - F/U fecal calprotectin    Milo Gunter MD  Gastroenterology Fellow  Pager number: [phone] / [phone]    Please page on call fellow on weekends and after 5pm on weekdays Impression:  1. Pancolitis with new onset of infectious colitis: GI PCR (8/10) positive for enteropathogenic E. coli.  2. patient is receiving pain management with morphine for abdominal pain with concern for addictive behavior    Recommendations:  - Diet as tolerated  - Continue ciprofloxacin 400 mg IV BID 3-5 days  - OSH records from [place] (primary team to obtain)  - F/U fecal calprotectin  - reduce pain medications to lowest necessary    Milo Gunter MD  Gastroenterology Fellow  Pager number: [phone] / [phone]    Please page on call fellow on weekends and after 5pm on weekdays

## 2018-08-14 NOTE — PROGRESS NOTE ADULT - PROBLEM SELECTOR PLAN 2
2/2 pancolitis. Improving.   -monitor bowel movements with stool count  -continue fluids  -monitor electrolytes

## 2018-08-14 NOTE — PROGRESS NOTE ADULT - SUBJECTIVE AND OBJECTIVE BOX
Patient is a 37y old  Male who presents with a chief complaint of "Indeterminate colitis flare" (09 Aug 2018 11:41)      SUBJECTIVE / OVERNIGHT EVENTS: Pt states he had diarrhea 5 times overnight but the quantity is overall improving. Abd pain also controlled. No nausea, vomiting, tolerating a full liquid diet. No chest pain, sob, palpitations fever, chills.     MEDICATIONS  (STANDING):  ciprofloxacin   IVPB      ciprofloxacin   IVPB 400 milliGRAM(s) IV Intermittent every 12 hours  folic acid 1 milliGRAM(s) Oral daily  lactobacillus acidophilus 1 Tablet(s) Oral daily  mesalamine Enema 4 Gram(s) Rectal at bedtime  mesalamine ER Capsule 500 milliGRAM(s) Oral four times a day  pantoprazole    Tablet 40 milliGRAM(s) Oral before breakfast  potassium chloride    Tablet ER 40 milliEquivalent(s) Oral once  sodium chloride 0.9%. 1000 milliLiter(s) (75 mL/Hr) IV Continuous <Continuous>    MEDICATIONS  (PRN):  acetaminophen   Tablet. 650 milliGRAM(s) Oral every 6 hours PRN Mild Pain (1 - 3)  artificial  tears Solution 1 Drop(s) Both EYES every 8 hours PRN Dry Eyes  morphine  - Injectable 2 milliGRAM(s) IV Push every 6 hours PRN Severe Pain (7 - 10)  ondansetron Injectable 4 milliGRAM(s) IV Push every 6 hours PRN Nausea and/or Vomiting      I&O's Summary      Vital Signs Last 24 Hrs  T(C): 37.3 (14 Aug 2018 05:29), Max: 37.3 (14 Aug 2018 05:29)  T(F): 99.1 (14 Aug 2018 05:29), Max: 99.1 (14 Aug 2018 05:29)  HR: 72 (14 Aug 2018 05:29) (69 - 72)  BP: 104/55 (14 Aug 2018 05:29) (104/55 - 123/62)  BP(mean): --  RR: 17 (14 Aug 2018 05:29) (17 - 17)  SpO2: 99% (14 Aug 2018 05:29) (96% - 99%)  CAPILLARY BLOOD GLUCOSE          PHYSICAL EXAM:  GENERAL: NAD, well-developed  HEAD:  Atraumatic, Normocephalic  EYES: EOMI, PERRLA, conjunctiva and sclera clear  NECK: Supple, No JVD  CHEST/LUNG: Clear to auscultation bilaterally; No wheeze  HEART: Regular rate and rhythm; No murmurs, rubs, or gallops  ABDOMEN: Soft, tender diffusely.   EXTREMITIES:  No clubbing, cyanosis, or edema  PSYCH: AAOx3  NEUROLOGY: non-focal  SKIN: No rashes or lesions    LABS:                        13.6   10.27 )-----------( 243      ( 14 Aug 2018 06:30 )             41.0     Auto Eosinophil # 0.72  / Auto Eosinophil % 7.0   / Auto Neutrophil # 6.16  / Auto Neutrophil % 59.9  / BANDS % x                            13.4   9.73  )-----------( 205      ( 13 Aug 2018 06:45 )             40.5     Auto Eosinophil # 0.77  / Auto Eosinophil % 7.9   / Auto Neutrophil # 5.65  / Auto Neutrophil % 58.1  / BANDS % x        08-14    133<L>  |  98  |  3<L>  ----------------------------<  111<H>  3.1<L>   |  22  |  0.63  08-13    135  |  101  |  3<L>  ----------------------------<  96  3.5   |  19<L>  |  0.62    Ca    8.0<L>      14 Aug 2018 06:30  TPro  5.1<L>  /  Alb  2.8<L>  /  TBili  0.2  /  DBili  x   /  AST  9   /  ALT  6   /  AlkPhos  24<L>  08-14  TPro  5.3<L>  /  Alb  2.8<L>  /  TBili  0.2  /  DBili  x   /  AST  9   /  ALT  5   /  AlkPhos  26<L>  08-13      RADIOLOGY & ADDITIONAL TESTS:    Imaging Personally Reviewed:    Consultant(s) Notes Reviewed:  Gastro    Care Discussed with Consultants/Other Providers:

## 2018-08-15 LAB
ALBUMIN SERPL ELPH-MCNC: 2.7 G/DL — LOW (ref 3.3–5)
ALP SERPL-CCNC: 21 U/L — LOW (ref 40–120)
ALT FLD-CCNC: 11 U/L — SIGNIFICANT CHANGE UP (ref 4–41)
AST SERPL-CCNC: 14 U/L — SIGNIFICANT CHANGE UP (ref 4–40)
BILIRUB SERPL-MCNC: 0.2 MG/DL — SIGNIFICANT CHANGE UP (ref 0.2–1.2)
BUN SERPL-MCNC: 2 MG/DL — LOW (ref 7–23)
CALCIUM SERPL-MCNC: 8.1 MG/DL — LOW (ref 8.4–10.5)
CHLORIDE SERPL-SCNC: 101 MMOL/L — SIGNIFICANT CHANGE UP (ref 98–107)
CO2 SERPL-SCNC: 23 MMOL/L — SIGNIFICANT CHANGE UP (ref 22–31)
CREAT SERPL-MCNC: 0.61 MG/DL — SIGNIFICANT CHANGE UP (ref 0.5–1.3)
GLUCOSE SERPL-MCNC: 100 MG/DL — HIGH (ref 70–99)
POTASSIUM SERPL-MCNC: 3.4 MMOL/L — LOW (ref 3.5–5.3)
POTASSIUM SERPL-SCNC: 3.4 MMOL/L — LOW (ref 3.5–5.3)
PROT SERPL-MCNC: 5.2 G/DL — LOW (ref 6–8.3)
SODIUM SERPL-SCNC: 138 MMOL/L — SIGNIFICANT CHANGE UP (ref 135–145)

## 2018-08-15 PROCEDURE — 99232 SBSQ HOSP IP/OBS MODERATE 35: CPT | Mod: GC

## 2018-08-15 RX ORDER — POTASSIUM CHLORIDE 20 MEQ
40 PACKET (EA) ORAL ONCE
Qty: 0 | Refills: 0 | Status: COMPLETED | OUTPATIENT
Start: 2018-08-15 | End: 2018-08-15

## 2018-08-15 RX ADMIN — Medication 40 MILLIEQUIVALENT(S): at 12:34

## 2018-08-15 RX ADMIN — MORPHINE SULFATE 2 MILLIGRAM(S): 50 CAPSULE, EXTENDED RELEASE ORAL at 12:50

## 2018-08-15 RX ADMIN — Medication 500 MILLIGRAM(S): at 23:07

## 2018-08-15 RX ADMIN — Medication 1 MILLIGRAM(S): at 12:33

## 2018-08-15 RX ADMIN — MORPHINE SULFATE 2 MILLIGRAM(S): 50 CAPSULE, EXTENDED RELEASE ORAL at 18:56

## 2018-08-15 RX ADMIN — MORPHINE SULFATE 2 MILLIGRAM(S): 50 CAPSULE, EXTENDED RELEASE ORAL at 05:05

## 2018-08-15 RX ADMIN — Medication 500 MILLIGRAM(S): at 17:33

## 2018-08-15 RX ADMIN — Medication 500 MILLIGRAM(S): at 12:33

## 2018-08-15 RX ADMIN — Medication 200 MILLIGRAM(S): at 12:34

## 2018-08-15 RX ADMIN — MORPHINE SULFATE 2 MILLIGRAM(S): 50 CAPSULE, EXTENDED RELEASE ORAL at 04:46

## 2018-08-15 RX ADMIN — Medication 200 MILLIGRAM(S): at 23:07

## 2018-08-15 RX ADMIN — MORPHINE SULFATE 2 MILLIGRAM(S): 50 CAPSULE, EXTENDED RELEASE ORAL at 12:35

## 2018-08-15 RX ADMIN — Medication 500 MILLIGRAM(S): at 06:15

## 2018-08-15 RX ADMIN — PANTOPRAZOLE SODIUM 40 MILLIGRAM(S): 20 TABLET, DELAYED RELEASE ORAL at 06:15

## 2018-08-15 RX ADMIN — Medication 1 TABLET(S): at 12:35

## 2018-08-15 RX ADMIN — MORPHINE SULFATE 2 MILLIGRAM(S): 50 CAPSULE, EXTENDED RELEASE ORAL at 19:03

## 2018-08-15 RX ADMIN — SODIUM CHLORIDE 75 MILLILITER(S): 9 INJECTION INTRAMUSCULAR; INTRAVENOUS; SUBCUTANEOUS at 06:15

## 2018-08-15 NOTE — PROGRESS NOTE ADULT - PROBLEM SELECTOR PLAN 1
Pt with underyling inflammatory colitis now with infectious etiology. Stool PCR + for  ENTEROPATHOGENIC ECOLI (EPEC),   -continue with cipro Day 4 today.   -c/w mesalamine, holding steroids in setting of Ecoli infection.   -will wean off IV fluids as patient now tolerating a soft diet. Encourage oral intake.   -f/up GI recs  -stool O/P negative. Viral culture pending.   -s/p Flex sig on 8/10  showed "Active colitis with features suggestive of chronicity"-CMV pending  -wean off morphine  -continue with low fiber diet.

## 2018-08-15 NOTE — PROVIDER CONTACT NOTE (OTHER) - ASSESSMENT
Patient alert and oriented to baseline. Patient emptied bladder in bathroom. Bladder scan post residual showed 0 ml of urine

## 2018-08-15 NOTE — PROGRESS NOTE ADULT - PROBLEM SELECTOR PLAN 2
2/2 pancolitis. Improving.   -monitor bowel movements with stool count  -continue fluids  -monitor electrolytes daily

## 2018-08-15 NOTE — PROGRESS NOTE ADULT - ASSESSMENT
Impression:  1. Pancolitis with new onset of infectious colitis: GI PCR (8/10) positive for enteropathogenic E. coli.  2. Abdominal pain: Currently being managed with IV narcotics; at increased risk of developing opioid dependence.    Recommendations:  - Diet as tolerated  - Continue ciprofloxacin 400 mg IV BID 3-5 days  - OSH records from [place] (primary team to obtain)  - F/U fecal calprotectin  - Attempt to wean narcotic medications    Milo Gunter MD  Gastroenterology Fellow  Pager number: [phone] / [phone]    Please page on call fellow on weekends and after 5pm on weekdays Impression:  1. Pancolitis with new onset of infectious colitis: GI PCR (8/10) positive for enteropathogenic E. coli.  2. Abdominal pain: Currently being managed with IV narcotics; at increased risk of developing opioid dependence.  3. 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

## 2018-08-15 NOTE — PROGRESS NOTE ADULT - SUBJECTIVE AND OBJECTIVE BOX
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 coli.    Interval Events:   - Patient continues to endorse improvement in his diarrhea, but is still requiring morphine for pain control    Allergies:  [facility] (H[facility])    Hospital Medications:  acetaminophen   Tablet. 650 milliGRAM(s) Oral every 6 hours PRN  artificial  tears Solution 1 Drop(s) Both EYES every 8 hours PRN  ciprofloxacin   IVPB      ciprofloxacin   IVPB 400 milliGRAM(s) IV Intermittent every 12 hours  folic acid 1 milliGRAM(s) Oral daily  lactobacillus acidophilus 1 Tablet(s) Oral daily  mesalamine Enema 4 Gram(s) Rectal at bedtime  mesalamine ER Capsule 500 milliGRAM(s) Oral four times a day  morphine  - Injectable 2 milliGRAM(s) IV Push every 6 hours PRN  ondansetron Injectable 4 milliGRAM(s) IV Push every 6 hours PRN  pantoprazole    Tablet 40 milliGRAM(s) Oral before breakfast  potassium chloride    Tablet ER 40 milliEquivalent(s) Oral once  sodium chloride 0.9%. 1000 milliLiter(s) IV Continuous <Continuous>    PMHX/PSHX:  Polyp of colon, unspecified part of colon, unspecified type  Condyloma  Herpes simplex type 2 infection  Herpes labialis  Ulcerative colitis  History of tracheostomy as a child  S/P knee surgery    Family history:  Family history of hypertension  Family history of cerebrovascular accident (Grandparent)  Family history of diabetes mellitus (Grandparent, Uncle)  No pertinent family history in first degree relatives  Family history of hypertension    Denies family history of colon cancer/polyps, stomach cancer/polyps, pancreatic cancer/masses, liver cancer/disease, ovarian cancer and endometrial cancer.    ROS:     General:  No wt loss, fevers, chills, night sweats, fatigue,   Eyes:  Good vision, no reported pain  ENT:  No sore throat, pain, runny nose, dysphagia  CV:  No pain, palpitations, hypo/hypertension  Pulm:  No dyspnea, cough, tachypnea, wheezing  GI:  + pain, No nausea, No vomiting, + diarrhea, No constipation, No weight loss, No fever, No pruritis, No rectal bleeding, No tarry stools, No dysphagia,  :  No pain, bleeding, incontinence, nocturia  Muscle:  No pain, weakness  Neuro:  No memory problems  Endocrine:  No polyuria, polydipsia, cold/heat intolerance  Heme:  No petechiae, ecchymosis, easy bruisability  Skin:  No rash, tattoos, scars, edema    PHYSICAL EXAM:   Vital Signs:  Vital Signs Last 24 Hrs  T(C): 37.1 (15 Aug 2018 06:16), Max: 37.3 (15 Aug 2018 04:42)  T(F): 98.8 (15 Aug 2018 06:16), Max: 99.1 (15 Aug 2018 04:42)  HR: 66 (15 Aug 2018 06:16) (66 - 86)  BP: 111/66 (15 Aug 2018 06:16) (110/64 - 128/72)  BP(mean): --  RR: 17 (15 Aug 2018 06:16) (17 - 18)  SpO2: 100% (15 Aug 2018 06:16) (97% - 100%)    GENERAL:  No acute distress  HEENT:  Normocephalic, atraumatic  no scleral icterus  CHEST:  Clear to auscultation bilaterally, no wheezes/rales/ronchi, no accessory muscle use  HEART:  Regular rate and rhythm, no murmurs/rubs/gallops  ABDOMEN:  Soft, mild diffuse tenderness, non-distended, normoactive bowel sounds, no masses, no hepato-splenomegaly, no signs of chronic liver disease  EXTREMITIES:  No cyanosis, clubbing, or edema  SKIN:  No rash/erythema/ecchymoses/petechiae/wounds/abscess/warm/dry  NEURO:  Alert and oriented x 3, no asterixis, no tremor    LABS:                        13.6   10.27 )-----------( 243      ( 14 Aug 2018 06:30 )             41.0       08-15    138  |  101  |  2<L>  ----------------------------<  100<H>  3.4<L>   |  23  |  0.61    Ca    8.1<L>      15 Aug 2018 06:25    TPro  5.2<L>  /  Alb  2.7<L>  /  TBili  0.2  /  DBili  x   /  AST  14  /  ALT  11  /  AlkPhos  21<L>  08-15    LIVER FUNCTIONS - ( 15 Aug 2018 06:25 )  Alb: 2.7 g/dL / Pro: 5.2 g/dL / ALK PHOS: 21 u/L / ALT: 11 u/L / AST: 14 u/L / GGT: x                      13.6   10.27 )-----------( 243      ( 14 Aug 2018 06:30 )             41.0                         13.4   9.73  )-----------( 205      ( 13 Aug 2018 06:45 )             40.5       Imaging:    No new imaging Chief Complaint:  37 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 coli.    Interval Events:   - Patient continues to endorse improvement in his diarrhea, but is still requiring morphine for pain control    Allergies:  [facility] (H[facility])    Hospital Medications:  acetaminophen   Tablet. 650 milliGRAM(s) Oral every 6 hours PRN  artificial  tears Solution 1 Drop(s) Both EYES every 8 hours PRN  ciprofloxacin   IVPB      ciprofloxacin   IVPB 400 milliGRAM(s) IV Intermittent every 12 hours  folic acid 1 milliGRAM(s) Oral daily  lactobacillus acidophilus 1 Tablet(s) Oral daily  mesalamine Enema 4 Gram(s) Rectal at bedtime  mesalamine ER Capsule 500 milliGRAM(s) Oral four times a day  morphine  - Injectable 2 milliGRAM(s) IV Push every 6 hours PRN  ondansetron Injectable 4 milliGRAM(s) IV Push every 6 hours PRN  pantoprazole    Tablet 40 milliGRAM(s) Oral before breakfast  potassium chloride    Tablet ER 40 milliEquivalent(s) Oral once  sodium chloride 0.9%. 1000 milliLiter(s) IV Continuous <Continuous>    PMHX/PSHX:  Polyp of colon, unspecified part of colon, unspecified type  Condyloma  Herpes simplex type 2 infection  Herpes labialis  Ulcerative colitis  History of tracheostomy as a child  S/P knee surgery    Family history:  Family history of hypertension  Family history of cerebrovascular accident (Grandparent)  Family history of diabetes mellitus (Grandparent, Uncle)  No pertinent family history in first degree relatives  Family history of hypertension    Denies family history of colon cancer/polyps, stomach cancer/polyps, pancreatic cancer/masses, liver cancer/disease, ovarian cancer and endometrial cancer.    ROS:     General:  No wt loss, fevers, chills, night sweats, fatigue,   Eyes:  Good vision, no reported pain  ENT:  No sore throat, pain, runny nose, dysphagia  CV:  No pain, palpitations, hypo/hypertension  Pulm:  No dyspnea, cough, tachypnea, wheezing  GI:  + pain, No nausea, No vomiting, + diarrhea, No constipation, No weight loss, No fever, No pruritis, No rectal bleeding, No tarry stools, No dysphagia,  :  No pain, bleeding, incontinence, nocturia  Muscle:  No pain, weakness  Neuro:  No memory problems  Endocrine:  No polyuria, polydipsia, cold/heat intolerance  Heme:  No petechiae, ecchymosis, easy bruisability  Skin:  No rash, tattoos, scars, edema    PHYSICAL EXAM:   Vital Signs:  Vital Signs Last 24 Hrs  T(C): 37.1 (15 Aug 2018 06:16), Max: 37.3 (15 Aug 2018 04:42)  T(F): 98.8 (15 Aug 2018 06:16), Max: 99.1 (15 Aug 2018 04:42)  HR: 66 (15 Aug 2018 06:16) (66 - 86)  BP: 111/66 (15 Aug 2018 06:16) (110/64 - 128/72)  BP(mean): --  RR: 17 (15 Aug 2018 06:16) (17 - 18)  SpO2: 100% (15 Aug 2018 06:16) (97% - 100%)    GENERAL:  No acute distress  HEENT:  Normocephalic, atraumatic  no scleral icterus  CHEST:  Clear to auscultation bilaterally, no wheezes/rales/ronchi, no accessory muscle use  HEART:  Regular rate and rhythm, no murmurs/rubs/gallops  ABDOMEN:  Soft, mild diffuse tenderness, non-distended, normoactive bowel sounds, no masses, no hepato-splenomegaly, no signs of chronic liver disease  EXTREMITIES:  No cyanosis, clubbing, or edema  SKIN:  No rash/erythema/ecchymoses/petechiae/wounds/abscess/warm/dry  NEURO:  Alert and oriented x 3, no asterixis, no tremor    LABS:                        13.6   10.27 )-----------( 243      ( 14 Aug 2018 06:30 )             41.0       08-15    138  |  101  |  2<L>  ----------------------------<  100<H>  3.4<L>   |  23  |  0.61    Ca    8.1<L>      15 Aug 2018 06:25    TPro  5.2<L>  /  Alb  2.7<L>  /  TBili  0.2  /  DBili  x   /  AST  14  /  ALT  11  /  AlkPhos  21<L>  08-15    LIVER FUNCTIONS - ( 15 Aug 2018 06:25 )  Alb: 2.7 g/dL / Pro: 5.2 g/dL / ALK PHOS: 21 u/L / ALT: 11 u/L / AST: 14 u/L / GGT: x                      13.6   10.27 )-----------( 243      ( 14 Aug 2018 06:30 )             41.0                         13.4   9.73  )-----------( 205      ( 13 Aug 2018 06:45 )             40.5       Imaging:    No new imaging    Pathology:    Surgical Pathology Report (08.10.18 @ 18:00)    Surgical Pathology Report:   ACCESSION No:  [id number]    ALEXANDRE FREDY                        1        Surgical Final Report          Final Diagnosis  1-Sigmoid colon, biopsy:  - Active colitis with features suggestive of chronicity, see  note.    Note: The clinical history of "inflammatory bowel disease" is  noted. The histologic features in this biopsy are compatible with  IBD with superimposed infection. No viral cytopathic changes,  pathogenic organisms, ova,  granulomas, or dysplasia identified. Immunostain for CMV will be  reported in an addendum.    Verified by: Ann-Marie Thompson M.D.  (Electronic Signature)  Reported on: 08/13/18 15:48 EDT,[address] [place] [address]  _________________________________________________________________    Clinical History  Colitis, flex sig, rule out CMV    Specimen(s) Submitted  1-Sigmoid colon    Gross Description  The specimen is received in formalin and the specimen container  is labeled: Sigmoid biopsy.  It  consists of two fragments of  soft, tan tissue each measuring 0.2 cm in maximum dimension.  Entirely submitted.  One cassette.    In addition to other data that may appear on the specimen  container, the label has been inspected to confirm the presence  of the patient's name and date of birth.  CRUZ 08/11/18 09:01

## 2018-08-15 NOTE — PROGRESS NOTE ADULT - SUBJECTIVE AND OBJECTIVE BOX
Patient is a 37y old  Male who presents with a chief complaint of "Indeterminate colitis flare" (09 Aug 2018 11:41)      SUBJECTIVE / OVERNIGHT EVENTS: Pt seen and evaluated at bedside. Diarrhea is better, went only 3 times since last night. Tolerating soft diet. Pain is also improving. Nausea resolved. No fevers, chills, sob, chest pain.     MEDICATIONS  (STANDING):  ciprofloxacin   IVPB      ciprofloxacin   IVPB 400 milliGRAM(s) IV Intermittent every 12 hours  folic acid 1 milliGRAM(s) Oral daily  lactobacillus acidophilus 1 Tablet(s) Oral daily  mesalamine Enema 4 Gram(s) Rectal at bedtime  mesalamine ER Capsule 500 milliGRAM(s) Oral four times a day  pantoprazole    Tablet 40 milliGRAM(s) Oral before breakfast  potassium chloride    Tablet ER 40 milliEquivalent(s) Oral once  sodium chloride 0.9%. 1000 milliLiter(s) (75 mL/Hr) IV Continuous <Continuous>    MEDICATIONS  (PRN):  acetaminophen   Tablet. 650 milliGRAM(s) Oral every 6 hours PRN Mild Pain (1 - 3)  artificial  tears Solution 1 Drop(s) Both EYES every 8 hours PRN Dry Eyes  morphine  - Injectable 2 milliGRAM(s) IV Push every 6 hours PRN Severe Pain (7 - 10)  ondansetron Injectable 4 milliGRAM(s) IV Push every 6 hours PRN Nausea and/or Vomiting      I&O's Summary      Vital Signs Last 24 Hrs  T(C): 37.1 (15 Aug 2018 06:16), Max: 37.3 (15 Aug 2018 04:42)  T(F): 98.8 (15 Aug 2018 06:16), Max: 99.1 (15 Aug 2018 04:42)  HR: 66 (15 Aug 2018 06:16) (66 - 86)  BP: 111/66 (15 Aug 2018 06:16) (110/64 - 128/72)  BP(mean): --  RR: 17 (15 Aug 2018 06:16) (17 - 18)  SpO2: 100% (15 Aug 2018 06:16) (97% - 100%)  CAPILLARY BLOOD GLUCOSE          PHYSICAL EXAM:  GENERAL: NAD, well-developed  HEAD:  Atraumatic, Normocephalic  EYES: conjunctiva and sclera clear  NECK: Supple, No JVD  CHEST/LUNG: Clear to auscultation bilaterally; No wheeze  HEART: Regular rate and rhythm; No murmurs, rubs, or gallops  ABDOMEN: Soft, mildly diffusely tender.   EXTREMITIES:  No clubbing, cyanosis, or edema  PSYCH: AAOx3  NEUROLOGY: non-focal  SKIN: No rashes or lesions    LABS:                        13.6   10.27 )-----------( 243      ( 14 Aug 2018 06:30 )             41.0     Auto Eosinophil # 0.72  / Auto Eosinophil % 7.0   / Auto Neutrophil # 6.16  / Auto Neutrophil % 59.9  / BANDS % x        08-15    138  |  101  |  2<L>  ----------------------------<  100<H>  3.4<L>   |  23  |  0.61  08-14    133<L>  |  98  |  3<L>  ----------------------------<  111<H>  3.1<L>   |  22  |  0.63    Ca    8.1<L>      15 Aug 2018 06:25  TPro  5.2<L>  /  Alb  2.7<L>  /  TBili  0.2  /  DBili  x   /  AST  14  /  ALT  11  /  AlkPhos  21<L>  08-15  TPro  5.1<L>  /  Alb  2.8<L>  /  TBili  0.2  /  DBili  x   /  AST  9   /  ALT  6   /  AlkPhos  24<L>  08-14      RADIOLOGY & ADDITIONAL TESTS:    Imaging Personally Reviewed:    Consultant(s) Notes Reviewed:  GAstro    Care Discussed with Consultants/Other Providers:

## 2018-08-15 NOTE — PROGRESS NOTE ADULT - ASSESSMENT
38 y/o with history UC (dx 20 years ago), also with likely Crohns disease (per [facility] reports) presents with worsening diarrhea and weight loss 2/2 infectious colitis flare with PCR studies positive for enteropathogenic Ecoli.

## 2018-08-16 ENCOUNTER — TRANSCRIPTION ENCOUNTER (OUTPATIENT)
Age: 38
End: 2018-08-16

## 2018-08-16 VITALS
TEMPERATURE: 98 F | RESPIRATION RATE: 18 BRPM | SYSTOLIC BLOOD PRESSURE: 109 MMHG | DIASTOLIC BLOOD PRESSURE: 60 MMHG | HEART RATE: 74 BPM

## 2018-08-16 LAB
ALBUMIN SERPL ELPH-MCNC: 2.9 G/DL — LOW (ref 3.3–5)
ALP SERPL-CCNC: 23 U/L — LOW (ref 40–120)
ALT FLD-CCNC: 19 U/L — SIGNIFICANT CHANGE UP (ref 4–41)
AST SERPL-CCNC: 20 U/L — SIGNIFICANT CHANGE UP (ref 4–40)
BILIRUB SERPL-MCNC: < 0.2 MG/DL — LOW (ref 0.2–1.2)
BUN SERPL-MCNC: 4 MG/DL — LOW (ref 7–23)
CALCIUM SERPL-MCNC: 8.4 MG/DL — SIGNIFICANT CHANGE UP (ref 8.4–10.5)
CHLORIDE SERPL-SCNC: 100 MMOL/L — SIGNIFICANT CHANGE UP (ref 98–107)
CO2 SERPL-SCNC: 28 MMOL/L — SIGNIFICANT CHANGE UP (ref 22–31)
CREAT SERPL-MCNC: 0.68 MG/DL — SIGNIFICANT CHANGE UP (ref 0.5–1.3)
GLUCOSE SERPL-MCNC: 103 MG/DL — HIGH (ref 70–99)
POTASSIUM SERPL-MCNC: 3.3 MMOL/L — LOW (ref 3.5–5.3)
POTASSIUM SERPL-SCNC: 3.3 MMOL/L — LOW (ref 3.5–5.3)
PROT SERPL-MCNC: 5.4 G/DL — LOW (ref 6–8.3)
SODIUM SERPL-SCNC: 138 MMOL/L — SIGNIFICANT CHANGE UP (ref 135–145)

## 2018-08-16 PROCEDURE — 99239 HOSP IP/OBS DSCHRG MGMT >30: CPT

## 2018-08-16 PROCEDURE — 99232 SBSQ HOSP IP/OBS MODERATE 35: CPT | Mod: GC

## 2018-08-16 RX ORDER — ONDANSETRON 8 MG/1
1 TABLET, FILM COATED ORAL
Qty: 12 | Refills: 0 | OUTPATIENT
Start: 2018-08-16 | End: 2018-08-18

## 2018-08-16 RX ORDER — POTASSIUM CHLORIDE 20 MEQ
40 PACKET (EA) ORAL ONCE
Qty: 0 | Refills: 0 | Status: COMPLETED | OUTPATIENT
Start: 2018-08-16 | End: 2018-08-16

## 2018-08-16 RX ORDER — LACTOBACILLUS ACIDOPHILUS 100MM CELL
1 CAPSULE ORAL
Qty: 30 | Refills: 0 | OUTPATIENT
Start: 2018-08-16 | End: 2018-09-14

## 2018-08-16 RX ORDER — MORPHINE SULFATE 50 MG/1
2 CAPSULE, EXTENDED RELEASE ORAL EVERY 6 HOURS
Qty: 0 | Refills: 0 | Status: DISCONTINUED | OUTPATIENT
Start: 2018-08-16 | End: 2018-08-16

## 2018-08-16 RX ORDER — LANOLIN ALCOHOL/MO/W.PET/CERES
1 CREAM (GRAM) TOPICAL
Qty: 14 | Refills: 0 | OUTPATIENT
Start: 2018-08-16 | End: 2018-08-29

## 2018-08-16 RX ORDER — MOXIFLOXACIN HYDROCHLORIDE TABLETS, 400 MG 400 MG/1
1 TABLET, FILM COATED ORAL
Qty: 1 | Refills: 0 | OUTPATIENT
Start: 2018-08-16 | End: 2018-08-16

## 2018-08-16 RX ADMIN — Medication 200 MILLIGRAM(S): at 12:01

## 2018-08-16 RX ADMIN — Medication 500 MILLIGRAM(S): at 12:02

## 2018-08-16 RX ADMIN — Medication 40 MILLIEQUIVALENT(S): at 12:01

## 2018-08-16 RX ADMIN — Medication 1 TABLET(S): at 12:03

## 2018-08-16 RX ADMIN — PANTOPRAZOLE SODIUM 40 MILLIGRAM(S): 20 TABLET, DELAYED RELEASE ORAL at 06:18

## 2018-08-16 RX ADMIN — Medication 500 MILLIGRAM(S): at 17:32

## 2018-08-16 RX ADMIN — MORPHINE SULFATE 2 MILLIGRAM(S): 50 CAPSULE, EXTENDED RELEASE ORAL at 00:58

## 2018-08-16 RX ADMIN — MORPHINE SULFATE 2 MILLIGRAM(S): 50 CAPSULE, EXTENDED RELEASE ORAL at 13:26

## 2018-08-16 RX ADMIN — MORPHINE SULFATE 2 MILLIGRAM(S): 50 CAPSULE, EXTENDED RELEASE ORAL at 07:04

## 2018-08-16 RX ADMIN — MORPHINE SULFATE 2 MILLIGRAM(S): 50 CAPSULE, EXTENDED RELEASE ORAL at 13:31

## 2018-08-16 RX ADMIN — Medication 1 MILLIGRAM(S): at 12:02

## 2018-08-16 RX ADMIN — MORPHINE SULFATE 2 MILLIGRAM(S): 50 CAPSULE, EXTENDED RELEASE ORAL at 07:19

## 2018-08-16 RX ADMIN — MORPHINE SULFATE 2 MILLIGRAM(S): 50 CAPSULE, EXTENDED RELEASE ORAL at 00:15

## 2018-08-16 RX ADMIN — Medication 500 MILLIGRAM(S): at 06:18

## 2018-08-16 NOTE — DISCHARGE NOTE ADULT - PATIENT PORTAL LINK FT
You can access the [facility] Patient Portal, offered by [facility], by registering with the following website: [email or web address]

## 2018-08-16 NOTE — DISCHARGE NOTE ADULT - PLAN OF CARE
Resolution of infection You presented with abdominal pain/diarrhea. You were seen by Gastroenterology in-hospital. CT scan showed colitis. You were found to be positive for E. coli. You were treated with IV antibiotics with improvement. Complete antibiotic therapy as directed.  Monitor for any further signs and symptoms of further infection, including but not limited to, fevers/chills, shortness of breath, increased heart rate, dizziness, or abrupt changes in mental status. As above. You were noted to have low potassium. You were repleted. Stable. Follow up outpatient PCP in 1-2 weeks for repeat BMP to monitor levels.

## 2018-08-16 NOTE — PROGRESS NOTE ADULT - PROVIDER SPECIALTY LIST ADULT
Gastroenterology
Hospitalist
Gastroenterology

## 2018-08-16 NOTE — DISCHARGE NOTE ADULT - CARE PLAN
Principal Discharge DX:	Pancolitis  Goal:	Resolution of infection  Assessment and plan of treatment:	You presented with abdominal pain/diarrhea. You were seen by Gastroenterology in-hospital. CT scan showed colitis. You were found to be positive for E. coli. You were treated with IV antibiotics with improvement. Complete antibiotic therapy as directed.  Monitor for any further signs and symptoms of further infection, including but not limited to, fevers/chills, shortness of breath, increased heart rate, dizziness, or abrupt changes in mental status.  Secondary Diagnosis:	Diarrhea  Assessment and plan of treatment:	As above.  Secondary Diagnosis:	Hypokalemia  Assessment and plan of treatment:	You were noted to have low potassium. You were repleted. Stable. Follow up outpatient PCP in 1-2 weeks for repeat BMP to monitor levels.

## 2018-08-16 NOTE — PROGRESS NOTE ADULT - SUBJECTIVE AND OBJECTIVE BOX
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 coli.    Interval Events:     Allergies:  [facility] (Hives)    [facility] Medications:  acetaminophen   Tablet. 650 milliGRAM(s) Oral every 6 hours PRN  artificial  tears Solution 1 Drop(s) Both EYES every 8 hours PRN  ciprofloxacin   IVPB      ciprofloxacin   IVPB 400 milliGRAM(s) IV Intermittent every 12 hours  folic acid 1 milliGRAM(s) Oral daily  lactobacillus acidophilus 1 Tablet(s) Oral daily  mesalamine Enema 4 Gram(s) Rectal at bedtime  mesalamine ER Capsule 500 milliGRAM(s) Oral four times a day  morphine  - Injectable 2 milliGRAM(s) IV Push every 6 hours PRN  ondansetron Injectable 4 milliGRAM(s) IV Push every 6 hours PRN  pantoprazole    Tablet 40 milliGRAM(s) Oral before breakfast  potassium chloride    Tablet ER 40 milliEquivalent(s) Oral once    PMHX/PSHX:  Polyp of colon, unspecified part of colon, unspecified type  Condyloma  Herpes simplex type 2 infection  Herpes labialis  Ulcerative colitis  History of tracheostomy as a child  S/P knee surgery    Family history:  Family history of hypertension  Family history of cerebrovascular accident (Grandparent)  Family history of diabetes mellitus (Grandparent, Uncle)  No pertinent family history in first degree relatives  Family history of hypertension    Denies family history of colon cancer/polyps, stomach cancer/polyps, pancreatic cancer/masses, liver cancer/disease, ovarian cancer and endometrial cancer.    ROS:     General:  No wt loss, fevers, chills, night sweats, fatigue,   Eyes:  Good vision, no reported pain  ENT:  No sore throat, pain, runny nose, dysphagia  CV:  No pain, palpitations, hypo/hypertension  Pulm:  No dyspnea, cough, tachypnea, wheezing  GI:  + pain, No nausea, No vomiting, + diarrhea, No constipation, No weight loss, No fever, No pruritis, No rectal bleeding, No tarry stools, No dysphagia,  :  No pain, bleeding, incontinence, nocturia  Muscle:  No pain, weakness  Neuro:  No memory problems  Endocrine:  No polyuria, polydipsia, cold/heat intolerance  Heme:  No petechiae, ecchymosis, easy bruisability  Skin:  No rash, tattoos, scars, edema    PHYSICAL EXAM:   Vital Signs:  Vital Signs Last 24 Hrs  T(C): 36.6 (16 Aug 2018 06:15), Max: 37 (15 Aug 2018 22:25)  T(F): 97.9 (16 Aug 2018 06:15), Max: 98.6 (15 Aug 2018 22:25)  HR: 65 (16 Aug 2018 06:15) (65 - 77)  BP: 98/50 (16 Aug 2018 06:15) (98/50 - 108/58)  BP(mean): --  RR: 18 (16 Aug 2018 06:15) (18 - 18)  SpO2: 100% (16 Aug 2018 06:15) (96% - 100%)    GENERAL:  No acute distress  HEENT:  Normocephalic, atraumatic  no scleral icterus  CHEST:  Clear to auscultation bilaterally, no wheezes/rales/ronchi, no accessory muscle use  HEART:  Regular rate and rhythm, no murmurs/rubs/gallops  ABDOMEN:  Soft, mild diffuse tenderness, non-distended, normoactive bowel sounds, no masses, no hepato-splenomegaly, no signs of chronic liver disease  EXTREMITIES:  No cyanosis, clubbing, or edema  SKIN:  No rash/erythema/ecchymoses/petechiae/wounds/abscess/warm/dry  NEURO:  Alert and oriented x 3, no asterixis, no tremor    LABS:    08-16    138  |  100  |  4<L>  ----------------------------<  103<H>  3.3<L>   |  28  |  0.68    Ca    8.4      16 Aug 2018 06:50    TPro  5.4<L>  /  Alb  2.9<L>  /  TBili  < 0.2<L>  /  DBili  x   /  AST  20  /  ALT  19  /  AlkPhos  23<L>  08-16    LIVER FUNCTIONS - ( 16 Aug 2018 06:50 )  Alb: 2.9 g/dL / Pro: 5.4 g/dL / ALK PHOS: 23 u/L / ALT: 19 u/L / AST: 20 u/L / GGT: x                      13.6   10.27 )-----------( 243      ( 14 Aug 2018 06:30 )             41.0     Imaging:    No new imaging Chief Complaint: 37 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 coli.    Interval Events:   - The patient endorses 3 watery bowel movements overnight. He also endorses severe abdominal pain and was given morphine IV for pain control.     Allergies:  [facility] (Hives)    [facility] Medications:  acetaminophen   Tablet. 650 milliGRAM(s) Oral every 6 hours PRN  artificial  tears Solution 1 Drop(s) Both EYES every 8 hours PRN  ciprofloxacin   IVPB      ciprofloxacin   IVPB 400 milliGRAM(s) IV Intermittent every 12 hours  folic acid 1 milliGRAM(s) Oral daily  lactobacillus acidophilus 1 Tablet(s) Oral daily  mesalamine Enema 4 Gram(s) Rectal at bedtime  mesalamine ER Capsule 500 milliGRAM(s) Oral four times a day  morphine  - Injectable 2 milliGRAM(s) IV Push every 6 hours PRN  ondansetron Injectable 4 milliGRAM(s) IV Push every 6 hours PRN  pantoprazole    Tablet 40 milliGRAM(s) Oral before breakfast  potassium chloride    Tablet ER 40 milliEquivalent(s) Oral once    PMHX/PSHX:  Polyp of colon, unspecified part of colon, unspecified type  Condyloma  Herpes simplex type 2 infection  Herpes labialis  Ulcerative colitis  History of tracheostomy as a child  S/P knee surgery    Family history:  Family history of hypertension  Family history of cerebrovascular accident (Grandparent)  Family history of diabetes mellitus (Grandparent, Uncle)  No pertinent family history in first degree relatives  Family history of hypertension    Denies family history of colon cancer/polyps, stomach cancer/polyps, pancreatic cancer/masses, liver cancer/disease, ovarian cancer and endometrial cancer.    ROS:     General:  No wt loss, fevers, chills, night sweats, fatigue,   Eyes:  Good vision, no reported pain  ENT:  No sore throat, pain, runny nose, dysphagia  CV:  No pain, palpitations, hypo/hypertension  Pulm:  No dyspnea, cough, tachypnea, wheezing  GI:  + pain, No nausea, No vomiting, + diarrhea, No constipation, No weight loss, No fever, No pruritis, No rectal bleeding, No tarry stools, No dysphagia,  :  No pain, bleeding, incontinence, nocturia  Muscle:  No pain, weakness  Neuro:  No memory problems  Endocrine:  No polyuria, polydipsia, cold/heat intolerance  Heme:  No petechiae, ecchymosis, easy bruisability  Skin:  No rash, tattoos, scars, edema    PHYSICAL EXAM:   Vital Signs:  Vital Signs Last 24 Hrs  T(C): 36.6 (16 Aug 2018 06:15), Max: 37 (15 Aug 2018 22:25)  T(F): 97.9 (16 Aug 2018 06:15), Max: 98.6 (15 Aug 2018 22:25)  HR: 65 (16 Aug 2018 06:15) (65 - 77)  BP: 98/50 (16 Aug 2018 06:15) (98/50 - 108/58)  BP(mean): --  RR: 18 (16 Aug 2018 06:15) (18 - 18)  SpO2: 100% (16 Aug 2018 06:15) (96% - 100%)    GENERAL:  No acute distress  HEENT:  Normocephalic, atraumatic  no scleral icterus  CHEST:  Clear to auscultation bilaterally, no wheezes/rales/ronchi, no accessory muscle use  HEART:  Regular rate and rhythm, no murmurs/rubs/gallops  ABDOMEN:  Soft, mild diffuse tenderness, non-distended, normoactive bowel sounds, no masses, no hepato-splenomegaly, no signs of chronic liver disease  EXTREMITIES:  No cyanosis, clubbing, or edema  SKIN:  No rash/erythema/ecchymoses/petechiae/wounds/abscess/warm/dry  NEURO:  Alert and oriented x 3, no asterixis, no tremor    LABS:    08-16    138  |  100  |  4<L>  ----------------------------<  103<H>  3.3<L>   |  28  |  0.68    Ca    8.4      16 Aug 2018 06:50    TPro  5.4<L>  /  Alb  2.9<L>  /  TBili  < 0.2<L>  /  DBili  x   /  AST  20  /  ALT  19  /  AlkPhos  23<L>  08-16    LIVER FUNCTIONS - ( 16 Aug 2018 06:50 )  Alb: 2.9 g/dL / Pro: 5.4 g/dL / ALK PHOS: 23 u/L / ALT: 19 u/L / AST: 20 u/L / GGT: x                      13.6   10.27 )-----------( 243      ( 14 Aug 2018 06:30 )             41.0     Imaging:    No new imaging

## 2018-08-16 NOTE — DISCHARGE NOTE ADULT - MEDICATION SUMMARY - MEDICATIONS TO TAKE
I will START or STAY ON the medications listed below when I get home from the hospital:    mesalamine 4 g/60 mL rectal enema  -- 60 milliliter(s) rectally once a day (at bedtime)  -- Indication: For Ulcerative chronic pancolitis without complications    mesalamine 500 mg oral capsule, extended release  -- 2 cap(s) by mouth 4 times a day  -- Indication: For Ulcerative chronic pancolitis without complications    oxyCODONE-acetaminophen 5 mg-325 mg oral tablet  -- 1 tab(s) by mouth every 4 hours, As Needed moderate to severe pain MDD:6 tabs  -- Caution federal law prohibits the transfer of this drug to any person other  than the person for whom it was prescribed.  May cause drowsiness.  Alcohol may intensify this effect.  Use care when operating dangerous machinery.  This prescription cannot be refilled.  This product contains acetaminophen.  Do not use  with any other product containing acetaminophen to prevent possible liver damage.  Using more of this medication than prescribed may cause serious breathing problems.    -- Indication: For Moderate to severe pain    Zofran 4 mg oral tablet  -- 1 tab(s) by mouth every 6 hours PRN nausea/vomiting   -- Indication: For Nausea/vomiting    ocular lubricant ophthalmic solution  -- 1 drop(s) to each affected eye every 8 hours, As Needed  -- Indication: For Eye drops    lactobacillus acidophilus oral capsule  -- 1 tab(s) by mouth once a day  -- Indication: For Probiotic    pantoprazole 40 mg oral delayed release tablet  -- 1 tab(s) by mouth once a day (before a meal)  -- Indication: For Ulcerative chronic pancolitis without complications    Cipro 500 mg oral tablet  -- 1 tab(s) by mouth once a day   -- Avoid prolonged or excessive exposure to direct and/or artificial sunlight while taking this medication.  Check with your doctor before becoming pregnant.  Do not take dairy products, antacids, or iron preparations within one hour of this medication.  Finish all this medication unless otherwise directed by prescriber.  Medication should be taken with plenty of water.    -- Indication: For Ulcerative chronic pancolitis without complications    folic acid 1 mg oral tablet  -- 1 tab(s) by mouth once a day  -- Indication: For Supplement I will START or STAY ON the medications listed below when I get home from the hospital:    mesalamine 4 g/60 mL rectal enema  -- 60 milliliter(s) rectally once a day (at bedtime)  -- Indication: For Ulcerative chronic pancolitis without complications    mesalamine 500 mg oral capsule, extended release  -- 2 cap(s) by mouth 4 times a day  -- Indication: For Ulcerative chronic pancolitis without complications    oxyCODONE-acetaminophen 5 mg-325 mg oral tablet  -- 1 tab(s) by mouth every 4 hours, As Needed moderate to severe pain MDD:6 tabs  -- Caution federal law prohibits the transfer of this drug to any person other  than the person for whom it was prescribed.  May cause drowsiness.  Alcohol may intensify this effect.  Use care when operating dangerous machinery.  This prescription cannot be refilled.  This product contains acetaminophen.  Do not use  with any other product containing acetaminophen to prevent possible liver damage.  Using more of this medication than prescribed may cause serious breathing problems.    -- Indication: For Moderate to severe pain    Zofran 4 mg oral tablet  -- 1 tab(s) by mouth every 6 hours PRN nausea/vomiting   -- Indication: For Nausea/vomiting    ocular lubricant ophthalmic solution  -- 1 drop(s) to each affected eye every 8 hours, As Needed  -- Indication: For Eye drops    lactobacillus acidophilus oral capsule  -- 1 tab(s) by mouth once a day  -- Indication: For Ulcerative chronic pancolitis without complications    pantoprazole 40 mg oral delayed release tablet  -- 1 tab(s) by mouth once a day (before a meal)  -- Indication: For Ulcerative chronic pancolitis without complications    Cipro 500 mg oral tablet  -- 1 tab(s) by mouth once a day   -- Avoid prolonged or excessive exposure to direct and/or artificial sunlight while taking this medication.  Check with your doctor before becoming pregnant.  Do not take dairy products, antacids, or iron preparations within one hour of this medication.  Finish all this medication unless otherwise directed by prescriber.  Medication should be taken with plenty of water.    -- Indication: For Ulcerative chronic pancolitis without complications    folic acid 1 mg oral tablet  -- 1 tab(s) by mouth once a day  -- Indication: For Supplement I will START or STAY ON the medications listed below when I get home from the hospital:    mesalamine 4 g/60 mL rectal enema  -- 60 milliliter(s) rectally once a day (at bedtime)  -- Indication: For Ulcerative chronic pancolitis without complications    mesalamine 500 mg oral capsule, extended release  -- 2 cap(s) by mouth 4 times a day  -- Indication: For Ulcerative chronic pancolitis without complications    oxyCODONE-acetaminophen 5 mg-325 mg oral tablet  -- 1 tab(s) by mouth every 4 hours, As Needed moderate to severe pain MDD:6 tabs  -- Caution federal law prohibits the transfer of this drug to any person other  than the person for whom it was prescribed.  May cause drowsiness.  Alcohol may intensify this effect.  Use care when operating dangerous machinery.  This prescription cannot be refilled.  This product contains acetaminophen.  Do not use  with any other product containing acetaminophen to prevent possible liver damage.  Using more of this medication than prescribed may cause serious breathing problems.    -- Indication: For Moderate to severe pain    Zofran 4 mg oral tablet  -- 1 tab(s) by mouth every 6 hours PRN nausea/vomiting   -- Indication: For Nausea/vomiting    Melatonin 3 mg oral tablet  -- 1 tab(s) by mouth once (at bedtime)  -- Indication: For Sleep    ocular lubricant ophthalmic solution  -- 1 drop(s) to each affected eye every 8 hours, As Needed  -- Indication: For Eye drops    lactobacillus acidophilus oral capsule  -- 1 tab(s) by mouth once a day  -- Indication: For Ulcerative chronic pancolitis without complications    pantoprazole 40 mg oral delayed release tablet  -- 1 tab(s) by mouth once a day (before a meal)  -- Indication: For Ulcerative chronic pancolitis without complications    Cipro 500 mg oral tablet  -- 1 tab(s) by mouth once a day   -- Avoid prolonged or excessive exposure to direct and/or artificial sunlight while taking this medication.  Check with your doctor before becoming pregnant.  Do not take dairy products, antacids, or iron preparations within one hour of this medication.  Finish all this medication unless otherwise directed by prescriber.  Medication should be taken with plenty of water.    -- Indication: For Ulcerative chronic pancolitis without complications    folic acid 1 mg oral tablet  -- 1 tab(s) by mouth once a day  -- Indication: For Supplement I will START or STAY ON the medications listed below when I get home from the hospital:    mesalamine 4 g/60 mL rectal enema  -- 60 milliliter(s) rectally once a day (at bedtime)  -- Indication: For Ulcerative chronic pancolitis without complications    mesalamine 500 mg oral capsule, extended release  -- 2 cap(s) by mouth 4 times a day  -- Indication: For Ulcerative chronic pancolitis without complications    oxyCODONE-acetaminophen 5 mg-325 mg oral tablet  -- 1 tab(s) by mouth every 4 hours, As Needed moderate to severe pain MDD:6 tabs  -- Caution federal law prohibits the transfer of this drug to any person other  than the person for whom it was prescribed.  May cause drowsiness.  Alcohol may intensify this effect.  Use care when operating dangerous machinery.  This prescription cannot be refilled.  This product contains acetaminophen.  Do not use  with any other product containing acetaminophen to prevent possible liver damage.  Using more of this medication than prescribed may cause serious breathing problems.    -- Indication: For Moderate to severe pain    Zofran 4 mg oral tablet  -- 1 tab(s) by mouth every 6 hours PRN nausea/vomiting   -- Indication: For Nausea/vomiting    Melatonin 3 mg oral tablet  -- 1 tab(s) by mouth once (at bedtime)  -- Indication: For Sleep     ocular lubricant ophthalmic solution  -- 1 drop(s) to each affected eye every 8 hours, As Needed  -- Indication: For Eye drops    lactobacillus acidophilus oral capsule  -- 1 tab(s) by mouth once a day  -- Indication: For Ulcerative chronic pancolitis without complications    pantoprazole 40 mg oral delayed release tablet  -- 1 tab(s) by mouth once a day (before a meal)  -- Indication: For Ulcerative chronic pancolitis without complications    Cipro 500 mg oral tablet  -- 1 tab(s) by mouth once a day   -- Avoid prolonged or excessive exposure to direct and/or artificial sunlight while taking this medication.  Check with your doctor before becoming pregnant.  Do not take dairy products, antacids, or iron preparations within one hour of this medication.  Finish all this medication unless otherwise directed by prescriber.  Medication should be taken with plenty of water.    -- Indication: For Ulcerative chronic pancolitis without complications    folic acid 1 mg oral tablet  -- 1 tab(s) by mouth once a day  -- Indication: For Supplement

## 2018-08-16 NOTE — PROGRESS NOTE ADULT - SUBJECTIVE AND OBJECTIVE BOX
Patient is a 37y old  Male who presents with a chief complaint of Indeterminate colitis flare (16 Aug 2018 12:49)      SUBJECTIVE / OVERNIGHT EVENTS:  clinically improved a lot. diarrhea and abd pain improved. tolerated oral intake well.     MEDICATIONS  (STANDING):  ciprofloxacin   IVPB      ciprofloxacin   IVPB 400 milliGRAM(s) IV Intermittent every 12 hours  folic acid 1 milliGRAM(s) Oral daily  lactobacillus acidophilus 1 Tablet(s) Oral daily  mesalamine Enema 4 Gram(s) Rectal at bedtime  mesalamine ER Capsule 500 milliGRAM(s) Oral four times a day  pantoprazole    Tablet 40 milliGRAM(s) Oral before breakfast    MEDICATIONS  (PRN):  acetaminophen   Tablet. 650 milliGRAM(s) Oral every 6 hours PRN Mild Pain (1 - 3)  artificial  tears Solution 1 Drop(s) Both EYES every 8 hours PRN Dry Eyes  morphine  - Injectable 2 milliGRAM(s) IV Push every 6 hours PRN Severe Pain (7 - 10)  ondansetron Injectable 4 milliGRAM(s) IV Push every 6 hours PRN Nausea and/or Vomiting      T(C): 36.6 (08-16-18 @ 13:25), Max: 37 (08-15-18 @ 22:25)  HR: 74 (08-16-18 @ 13:25) (65 - 77)  BP: 109/60 (08-16-18 @ 13:25) (98/50 - 109/60)  RR: 18 (08-16-18 @ 13:25) (18 - 18)  SpO2: 100% (08-16-18 @ 06:15) (100% - 100%)  CAPILLARY BLOOD GLUCOSE        I&O's Summary      PHYSICAL EXAM:  GENERAL: NAD, well-developed  HEAD:  Atraumatic, Normocephalic  EYES: EOMI, PERRLA, conjunctiva and sclera clear  NECK: Supple, No JVD  CHEST/LUNG: Clear to auscultation bilaterally; No wheeze  HEART: s1 s2, regular rhythm and rate   ABDOMEN: Soft, Nontender, Nondistended; Bowel sounds present  EXTREMITIES:  2+ Peripheral Pulses, No clubbing, cyanosis, or edema  PSYCH: AAOx3, calm   NEUROLOGY: non-focal  SKIN: No rashes or lesions    LABS:    08-16    138  |  100  |  4<L>  ----------------------------<  103<H>  3.3<L>   |  28  |  0.68    Ca    8.4      16 Aug 2018 06:50    TPro  5.4<L>  /  Alb  2.9<L>  /  TBili  < 0.2<L>  /  DBili  x   /  AST  20  /  ALT  19  /  AlkPhos  23<L>  08-16              RADIOLOGY & ADDITIONAL TESTS:    Imaging Personally Reviewed:    Consultant(s) Notes Reviewed:      Care Discussed with Consultants/Other Providers:

## 2018-08-16 NOTE — PROGRESS NOTE ADULT - ASSESSMENT
Impression:  1. Pancolitis with new onset of infectious colitis: GI PCR (8/10) positive for enteropathogenic E. coli.  2. Abdominal pain: Currently being managed with IV narcotics; at increased risk of developing opioid dependence.  3. Hypoalbuminemia: Low albumin may be due to protein losing enteropathy in setting of colitis    Recommendations:  - Diet as tolerated  - Continue ciprofloxacin 400 mg IV BID 3-5 days  - OSH records from [place] (primary team to obtain)  - F/U fecal calprotectin  - Attempt to wean narcotic medications    Milo Gunter MD  Gastroenterology Fellow  Pager number: [phone] / [phone]    Please page on call fellow on weekends and after 5pm on weekdays Impression:  1. Pancolitis with new onset of infectious colitis: GI PCR (8/10) positive for enteropathogenic E. coli.  2. Abdominal pain: Currently being managed with IV narcotics; at increased risk of developing opioid dependence.  3. Hypoalbuminemia: Low albumin may be due to protein losing enteropathy in setting of colitis.    Recommendations:  - Diet as tolerated  - Continue ciprofloxacin 400 mg IV BID for 5 days  - Check pre-albumin  - F/U fecal calprotectin  - Attempt to wean narcotic medications    Milo Gunter MD  Gastroenterology Fellow  Pager number: [phone] / [phone]    Please page on call fellow on weekends and after 5pm on weekdays

## 2018-08-16 NOTE — PROGRESS NOTE ADULT - PROBLEM SELECTOR PLAN 1
Pt with underlying inflammatory colitis now with infectious etiology. Stool PCR + for  ENTEROPATHOGENIC ECOLI (EPEC),   -continue with cipro for totally 5 days  -c/w mesalamine, holding steroids in setting of Ecoli infection.   Encourage oral intake.   -f/up GI as outpatient   -stool O/P negative.  -s/p Flex sig on 8/10  showed "Active colitis with features suggestive of chronicity"-CMV pending  -wean off morphine  -continue with low fiber diet. Pt with underlying inflammatory colitis superimposed with ENTEROPATHOGENIC ECOLI (EPEC) infection  -continue with cipro for totally 5 days  -c/w mesalamine, holding steroids in setting of Ecoli infection.   Encourage oral intake.   -f/up GI as outpatient   -stool O/P negative.  -s/p Flex sig on 8/10  showed "Active colitis with features suggestive of chronicity"-CMV neg  -wean off morphine  -continue with low fiber diet.

## 2018-08-16 NOTE — PROGRESS NOTE ADULT - ATTENDING COMMENTS
I have seen and evaluated the patient with the GI Fellow and GI Team.  I agree with the findings, formulation and plan of care as documented in the  fellow's note, except as noted.
Patient seen and examined by myself , case discussed  with resident ,agree with the above finding and plan
Pt seen and examined. He reports diarrhea is getting less. Abd pain improved  c/w current regimen   monitor clinical response
d/c planning. Time 40 min
Pt seen and examined. He reports diarrhea / abd pain improved  c/w current regimen   monitor clinical response  f/u GI rec

## 2018-08-16 NOTE — DISCHARGE NOTE ADULT - HOSPITAL COURSE
37 M self-reported Hx "indeterminate colitis" on Mesalamine, Golimumab, Rifaximin p/w worsening diarrhea, abdominal pain, chills, weight loss (10 lbs) x 2 weeks. Seen at [facility], discharged after refusing CT scan. Endorses 10 episodes of watery diarrhea daily and noticed blood mixed in recently. As per pt., initially told had UC but workup done at [facility] shows colitis indeterminate. C. diff negative     Pancolitis  -Mesalamine, Golimumab, Rifaximin   -IV Solucortef, held as per GI recs   -CT A/P - acute pancolonic UC flare.  -IVF  -8/10 - poor prep, CMV negative   -GI PCR positive for Enteropathogenic E. coli  -Cipro 3-5 days   -Stable, F/U outpatient PCP       Weight loss  -As above   -Stable, F/U outpatient PCP     Discharge to home 37 M self-reported Hx "indeterminate colitis" on Mesalamine, Golimumab, Rifaximin p/w worsening diarrhea, abdominal pain, chills, weight loss (10 lbs) x 2 weeks. Seen at [facility], discharged after refusing CT scan. Endorses 10 episodes of watery diarrhea daily and noticed blood mixed in recently. As per pt., initially told had UC but workup done at [facility] shows colitis indeterminate. C. diff negative     Pancolitis  -CT A/P - acute pancolonic UC flare.  -IVF  -8/10 - poor prep, CMV negative   -GI PCR positive for Enteropathogenic E. coli  -Cipro 5 days   -Stable, F/U outpatient PCP     Discharge to home

## 2018-08-21 LAB — VIRUS SPEC CULT: SIGNIFICANT CHANGE UP

## 2018-08-24 ENCOUNTER — MESSAGE (OUTPATIENT)
Age: 38
End: 2018-08-24

## 2018-08-24 ENCOUNTER — INPATIENT (INPATIENT)
Facility: HOSPITAL | Age: 38
LOS: 3 days | Discharge: HOME HEALTH | End: 2018-08-28
Attending: HOSPITALIST | Admitting: HOSPITALIST
Payer: MEDICAID

## 2018-08-24 VITALS
RESPIRATION RATE: 16 BRPM | HEART RATE: 85 BPM | SYSTOLIC BLOOD PRESSURE: 124 MMHG | DIASTOLIC BLOOD PRESSURE: 73 MMHG | OXYGEN SATURATION: 98 % | TEMPERATURE: 99 F

## 2018-08-24 DIAGNOSIS — R19.7 DIARRHEA, UNSPECIFIED: ICD-10-CM

## 2018-08-24 DIAGNOSIS — R10.9 UNSPECIFIED ABDOMINAL PAIN: ICD-10-CM

## 2018-08-24 DIAGNOSIS — K57.90 DIVERTICULOSIS OF INTESTINE, PART UNSPECIFIED, WITHOUT PERFORATION OR ABSCESS WITHOUT BLEEDING: ICD-10-CM

## 2018-08-24 DIAGNOSIS — K52.3 INDETERMINATE COLITIS: ICD-10-CM

## 2018-08-24 DIAGNOSIS — Z87.09 PERSONAL HISTORY OF OTHER DISEASES OF THE RESPIRATORY SYSTEM: Chronic | ICD-10-CM

## 2018-08-24 DIAGNOSIS — Z29.9 ENCOUNTER FOR PROPHYLACTIC MEASURES, UNSPECIFIED: ICD-10-CM

## 2018-08-24 LAB
ALBUMIN SERPL ELPH-MCNC: 3.3 G/DL — SIGNIFICANT CHANGE UP (ref 3.3–5)
ALP SERPL-CCNC: 27 U/L — LOW (ref 40–120)
ALT FLD-CCNC: 37 U/L — SIGNIFICANT CHANGE UP (ref 4–41)
APPEARANCE UR: CLEAR — SIGNIFICANT CHANGE UP
APTT BLD: 32.2 SEC — SIGNIFICANT CHANGE UP (ref 27.5–37.4)
AST SERPL-CCNC: 26 U/L — SIGNIFICANT CHANGE UP (ref 4–40)
BACTERIA # UR AUTO: NEGATIVE — SIGNIFICANT CHANGE UP
BASE EXCESS BLDV CALC-SCNC: 2.1 MMOL/L — SIGNIFICANT CHANGE UP
BASOPHILS # BLD AUTO: 0.06 K/UL — SIGNIFICANT CHANGE UP (ref 0–0.2)
BASOPHILS NFR BLD AUTO: 0.5 % — SIGNIFICANT CHANGE UP (ref 0–2)
BILIRUB SERPL-MCNC: 0.3 MG/DL — SIGNIFICANT CHANGE UP (ref 0.2–1.2)
BILIRUB UR-MCNC: NEGATIVE — SIGNIFICANT CHANGE UP
BLOOD GAS VENOUS - CREATININE: 0.56 MG/DL — SIGNIFICANT CHANGE UP (ref 0.5–1.3)
BLOOD UR QL VISUAL: NEGATIVE — SIGNIFICANT CHANGE UP
BUN SERPL-MCNC: 11 MG/DL — SIGNIFICANT CHANGE UP (ref 7–23)
CALCIUM SERPL-MCNC: 8.9 MG/DL — SIGNIFICANT CHANGE UP (ref 8.4–10.5)
CHLORIDE BLDV-SCNC: 104 MMOL/L — SIGNIFICANT CHANGE UP (ref 96–108)
CHLORIDE SERPL-SCNC: 99 MMOL/L — SIGNIFICANT CHANGE UP (ref 98–107)
CO2 SERPL-SCNC: 24 MMOL/L — SIGNIFICANT CHANGE UP (ref 22–31)
COLOR SPEC: YELLOW — SIGNIFICANT CHANGE UP
CREAT SERPL-MCNC: 0.75 MG/DL — SIGNIFICANT CHANGE UP (ref 0.5–1.3)
CRP SERPL-MCNC: < 4 MG/L — SIGNIFICANT CHANGE UP
EOSINOPHIL # BLD AUTO: 0.46 K/UL — SIGNIFICANT CHANGE UP (ref 0–0.5)
EOSINOPHIL NFR BLD AUTO: 4.1 % — SIGNIFICANT CHANGE UP (ref 0–6)
ERYTHROCYTE [SEDIMENTATION RATE] IN BLOOD: 16 MM/HR — HIGH (ref 1–15)
GAS PNL BLDV: 135 MMOL/L — LOW (ref 136–146)
GLUCOSE BLDV-MCNC: 102 — HIGH (ref 70–99)
GLUCOSE SERPL-MCNC: 95 MG/DL — SIGNIFICANT CHANGE UP (ref 70–99)
GLUCOSE UR-MCNC: NEGATIVE — SIGNIFICANT CHANGE UP
HCO3 BLDV-SCNC: 26 MMOL/L — SIGNIFICANT CHANGE UP (ref 20–27)
HCT VFR BLD CALC: 41.2 % — SIGNIFICANT CHANGE UP (ref 39–50)
HCT VFR BLDV CALC: 42.2 % — SIGNIFICANT CHANGE UP (ref 39–51)
HGB BLD-MCNC: 13.4 G/DL — SIGNIFICANT CHANGE UP (ref 13–17)
HGB BLDV-MCNC: 13.7 G/DL — SIGNIFICANT CHANGE UP (ref 13–17)
HYALINE CASTS # UR AUTO: NEGATIVE — SIGNIFICANT CHANGE UP
IMM GRANULOCYTES # BLD AUTO: 0.03 # — SIGNIFICANT CHANGE UP
IMM GRANULOCYTES NFR BLD AUTO: 0.3 % — SIGNIFICANT CHANGE UP (ref 0–1.5)
INR BLD: 1.12 — SIGNIFICANT CHANGE UP (ref 0.88–1.17)
KETONES UR-MCNC: SIGNIFICANT CHANGE UP
LACTATE BLDV-MCNC: 0.9 MMOL/L — SIGNIFICANT CHANGE UP (ref 0.5–2)
LEUKOCYTE ESTERASE UR-ACNC: NEGATIVE — SIGNIFICANT CHANGE UP
LIDOCAIN IGE QN: 14.2 U/L — SIGNIFICANT CHANGE UP (ref 7–60)
LYMPHOCYTES # BLD AUTO: 2.28 K/UL — SIGNIFICANT CHANGE UP (ref 1–3.3)
LYMPHOCYTES # BLD AUTO: 20.6 % — SIGNIFICANT CHANGE UP (ref 13–44)
MAGNESIUM SERPL-MCNC: 2.2 MG/DL — SIGNIFICANT CHANGE UP (ref 1.6–2.6)
MCHC RBC-ENTMCNC: 27.8 PG — SIGNIFICANT CHANGE UP (ref 27–34)
MCHC RBC-ENTMCNC: 32.5 % — SIGNIFICANT CHANGE UP (ref 32–36)
MCV RBC AUTO: 85.5 FL — SIGNIFICANT CHANGE UP (ref 80–100)
MONOCYTES # BLD AUTO: 1.2 K/UL — HIGH (ref 0–0.9)
MONOCYTES NFR BLD AUTO: 10.8 % — SIGNIFICANT CHANGE UP (ref 2–14)
NEUTROPHILS # BLD AUTO: 7.06 K/UL — SIGNIFICANT CHANGE UP (ref 1.8–7.4)
NEUTROPHILS NFR BLD AUTO: 63.7 % — SIGNIFICANT CHANGE UP (ref 43–77)
NITRITE UR-MCNC: NEGATIVE — SIGNIFICANT CHANGE UP
NRBC # FLD: 0 — SIGNIFICANT CHANGE UP
PCO2 BLDV: 42 MMHG — SIGNIFICANT CHANGE UP (ref 41–51)
PH BLDV: 7.41 PH — SIGNIFICANT CHANGE UP (ref 7.32–7.43)
PH UR: 7 — SIGNIFICANT CHANGE UP (ref 5–8)
PLATELET # BLD AUTO: 302 K/UL — SIGNIFICANT CHANGE UP (ref 150–400)
PMV BLD: 9.4 FL — SIGNIFICANT CHANGE UP (ref 7–13)
PO2 BLDV: 137 MMHG — HIGH (ref 35–40)
POTASSIUM BLDV-SCNC: 3.8 MMOL/L — SIGNIFICANT CHANGE UP (ref 3.4–4.5)
POTASSIUM SERPL-MCNC: 4.7 MMOL/L — SIGNIFICANT CHANGE UP (ref 3.5–5.3)
POTASSIUM SERPL-SCNC: 4.7 MMOL/L — SIGNIFICANT CHANGE UP (ref 3.5–5.3)
PROT SERPL-MCNC: 6.3 G/DL — SIGNIFICANT CHANGE UP (ref 6–8.3)
PROT UR-MCNC: SIGNIFICANT CHANGE UP
PROTHROM AB SERPL-ACNC: 12.5 SEC — SIGNIFICANT CHANGE UP (ref 9.8–13.1)
RBC # BLD: 4.82 M/UL — SIGNIFICANT CHANGE UP (ref 4.2–5.8)
RBC # FLD: 13.8 % — SIGNIFICANT CHANGE UP (ref 10.3–14.5)
RBC CASTS # UR COMP ASSIST: HIGH (ref 0–?)
SAO2 % BLDV: 98.2 % — HIGH (ref 60–85)
SODIUM SERPL-SCNC: 137 MMOL/L — SIGNIFICANT CHANGE UP (ref 135–145)
SP GR SPEC: 1.03 — SIGNIFICANT CHANGE UP (ref 1–1.04)
SQUAMOUS # UR AUTO: SIGNIFICANT CHANGE UP
UROBILINOGEN FLD QL: NORMAL — SIGNIFICANT CHANGE UP
WBC # BLD: 11.09 K/UL — HIGH (ref 3.8–10.5)
WBC # FLD AUTO: 11.09 K/UL — HIGH (ref 3.8–10.5)
WBC UR QL: SIGNIFICANT CHANGE UP (ref 0–?)

## 2018-08-24 PROCEDURE — 99222 1ST HOSP IP/OBS MODERATE 55: CPT | Mod: GC

## 2018-08-24 PROCEDURE — 99223 1ST HOSP IP/OBS HIGH 75: CPT | Mod: GC

## 2018-08-24 RX ORDER — SODIUM CHLORIDE 9 MG/ML
1000 INJECTION INTRAMUSCULAR; INTRAVENOUS; SUBCUTANEOUS
Qty: 0 | Refills: 0 | Status: DISCONTINUED | OUTPATIENT
Start: 2018-08-24 | End: 2018-08-28

## 2018-08-24 RX ORDER — FOLIC ACID 0.8 MG
1 TABLET ORAL DAILY
Qty: 0 | Refills: 0 | Status: DISCONTINUED | OUTPATIENT
Start: 2018-08-24 | End: 2018-08-28

## 2018-08-24 RX ORDER — SODIUM CHLORIDE 9 MG/ML
1000 INJECTION INTRAMUSCULAR; INTRAVENOUS; SUBCUTANEOUS ONCE
Qty: 0 | Refills: 0 | Status: COMPLETED | OUTPATIENT
Start: 2018-08-24 | End: 2018-08-24

## 2018-08-24 RX ORDER — OXYCODONE HYDROCHLORIDE 5 MG/1
5 TABLET ORAL EVERY 4 HOURS
Qty: 0 | Refills: 0 | Status: DISCONTINUED | OUTPATIENT
Start: 2018-08-24 | End: 2018-08-28

## 2018-08-24 RX ORDER — MORPHINE SULFATE 50 MG/1
4 CAPSULE, EXTENDED RELEASE ORAL ONCE
Qty: 0 | Refills: 0 | Status: DISCONTINUED | OUTPATIENT
Start: 2018-08-24 | End: 2018-08-24

## 2018-08-24 RX ORDER — MESALAMINE 400 MG
1 TABLET, DELAYED RELEASE (ENTERIC COATED) ORAL
Qty: 0 | Refills: 0 | COMMUNITY

## 2018-08-24 RX ORDER — ONDANSETRON 8 MG/1
4 TABLET, FILM COATED ORAL ONCE
Qty: 0 | Refills: 0 | Status: COMPLETED | OUTPATIENT
Start: 2018-08-24 | End: 2018-08-24

## 2018-08-24 RX ORDER — FOLIC ACID 0.8 MG
1 TABLET ORAL
Qty: 0 | Refills: 0 | COMMUNITY

## 2018-08-24 RX ORDER — GOLIMUMAB 50 MG/4ML
1 SOLUTION INTRAVENOUS
Qty: 0 | Refills: 0 | COMMUNITY

## 2018-08-24 RX ORDER — MORPHINE SULFATE 50 MG/1
2 CAPSULE, EXTENDED RELEASE ORAL EVERY 6 HOURS
Qty: 0 | Refills: 0 | Status: DISCONTINUED | OUTPATIENT
Start: 2018-08-24 | End: 2018-08-26

## 2018-08-24 RX ADMIN — MORPHINE SULFATE 2 MILLIGRAM(S): 50 CAPSULE, EXTENDED RELEASE ORAL at 22:00

## 2018-08-24 RX ADMIN — SODIUM CHLORIDE 100 MILLILITER(S): 9 INJECTION INTRAMUSCULAR; INTRAVENOUS; SUBCUTANEOUS at 18:58

## 2018-08-24 RX ADMIN — Medication 125 MILLIGRAM(S): at 14:04

## 2018-08-24 RX ADMIN — SODIUM CHLORIDE 100 MILLILITER(S): 9 INJECTION INTRAMUSCULAR; INTRAVENOUS; SUBCUTANEOUS at 21:43

## 2018-08-24 RX ADMIN — MORPHINE SULFATE 2 MILLIGRAM(S): 50 CAPSULE, EXTENDED RELEASE ORAL at 21:42

## 2018-08-24 RX ADMIN — ONDANSETRON 4 MILLIGRAM(S): 8 TABLET, FILM COATED ORAL at 14:04

## 2018-08-24 RX ADMIN — MORPHINE SULFATE 4 MILLIGRAM(S): 50 CAPSULE, EXTENDED RELEASE ORAL at 14:04

## 2018-08-24 RX ADMIN — SODIUM CHLORIDE 1000 MILLILITER(S): 9 INJECTION INTRAMUSCULAR; INTRAVENOUS; SUBCUTANEOUS at 14:05

## 2018-08-24 NOTE — ED PROVIDER NOTE - FAMILY HISTORY
Family history of hypertension, father     Grandparent  Still living? Unknown  Family history of diabetes mellitus, Age at diagnosis: Age Unknown  Family history of cerebrovascular accident, Age at diagnosis: Age Unknown     Uncle  Still living? Unknown  Family history of diabetes mellitus, Age at diagnosis: Age Unknown

## 2018-08-24 NOTE — H&P ADULT - PROBLEM SELECTOR PLAN 1
infectious colitis vs exacerbation of existing chronic colitis   -monitor off abx for now  -will continue to monitor stool count   -check c. diff, GI PCT stool, stool OVA/parasite, ESR, CRP, fecal calprotectin,  procalcitonin   -clear liquid diet for now, advance as tolerate  -cbc qday  -IV fluid hydration w/ NS @100cc/hr stop after 10 hrs.

## 2018-08-24 NOTE — H&P ADULT - NSHPLABSRESULTS_GEN_ALL_CORE
.  Labs reviewed personally.                          13.4   11.09 )-----------( 302      ( 24 Aug 2018 14:00 )             41.2     Hgb Trend: 13.4<--  08-24    137  |  99  |  11  ----------------------------<  95  4.7   |  24  |  0.75    Ca    8.9      24 Aug 2018 14:00  Mg     2.2     08-24    TPro  6.3  /  Alb  3.3  /  TBili  0.3  /  DBili  x   /  AST  26  /  ALT  37  /  AlkPhos  27<L>  08-24    Creatinine Trend: 0.75<--, 0.68<--, 0.61<--, 0.63<--, 0.62<--, 0.64<--  PT/INR - ( 24 Aug 2018 14:00 )   PT: 12.5 SEC;   INR: 1.12          PTT - ( 24 Aug 2018 14:00 )  PTT:32.2 SEC      Imaging reviewed personally.  < from: Flexible Sigmoidoscopy (08.10.18 @ 17:38) >     Poor bowel preparation obscured views of the mucosa, though granular        erythematous changes were seen. Retroflexion was not performed.       See note                                                                                   Impression:          - Preparation of the colon was poor.                       - Specimens collected for CMV colitis.  Recommendation:      - Await pathology results.                       - Recommendations per GI progress notes.                       - The findings and recommendations were discussed with                        the patient. Copy of note given to patient.                 - The findings and recommendations were discussed with                        the patient's primary physician.    < end of copied text >      < from: CT Abdomen and Pelvis w/ Oral Cont and w/ IV Cont (08.09.18 @ 06:50) >    FINDINGS:    LOWER CHEST: Within normal limits.    LIVER: Stable segment 7 flash filling hemangiomas.  BILE DUCTS: Normal caliber.  GALLBLADDER: Within normal limits.  SPLEEN: Within normal limits.  PANCREAS: Within normal limits.  ADRENALS: Within normal limits.  KIDNEYS/URETERS: Within normal limits.    BLADDER: Within normal limits.  REPRODUCTIVE ORGANS: The prostate is within normal limits.     BOWEL: Contiguous hyperemia and mild wall thickening of the colon from   rectum to cecum. Normal appendix. No bowel obstruction.   PERITONEUM: No ascites.  VESSELS:  Within normal limits.  RETROPERITONEUM: No lymphadenopathy.    ABDOMINAL WALL: Within normal limits.  BONES: Within normal limits.    IMPRESSION: Acute pan colonic ulcerative colitis flare.    < end of copied text >

## 2018-08-24 NOTE — CONSULT NOTE ADULT - ASSESSMENT
Impression:    1. 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 Recently treated for EPEC during last hospitalization.     Recommendations:  - Check ESR, CRP, fecal calprotectin  - Check C-diff PCR, GI-PCR, stool culture  - Hold steroids pending negative C-diff PCR and GI-PCR  - Diet as tolerated  - Plan for colonoscopy next week for assessment of disease activity    Milo Gunter MD  Gastroenterology Fellow  Pager number: [phone] / [phone] Impression:    1. 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 Recently treated for EPEC during last hospitalization.     Recommendations:  - Check ESR, CRP, fecal calprotectin  - Check C-diff PCR, GI-PCR, stool culture  - Hold steroids pending negative C-diff PCR and GI-PCR  - Diet as tolerated    Milo Gunter MD  Gastroenterology Fellow  Pager number: [phone] / [phone]

## 2018-08-24 NOTE — H&P ADULT - PROBLEM SELECTOR PLAN 3
s/p 3 doses of golimumab, holding steroids for now   -patient to undergo colonoscopy likely next week

## 2018-08-24 NOTE — ED PROVIDER NOTE - PROGRESS NOTE DETAILS
Called the office of pt's GI doctor Dr. Garces, spoke to NP who said that they will send a GI fellow to see pt in ED Dr. Eric: labs reviewed, awaiting GI fellow; pt in ED for for approx 5 hours and has not yet been able to provide stool specimen--will be admitted for continued inability to tolerate PO and bloody diarrhea Dr. Eric: discussed with hospitalist Dr. Encarnacion and accepted for admission; pt still unable to provide stool sample while in ED--will be on contact precautions for now and inpatient team can decide if can be removed

## 2018-08-24 NOTE — H&P ADULT - PROBLEM SELECTOR PLAN 4
IMPROVE score: 0  DVT ppx: SCDs     Dr.Benziger Begum   Internal Medicine   PGY-2   [phone] (long range pager)  [phone] (short range)

## 2018-08-24 NOTE — CONSULT NOTE ADULT - SUBJECTIVE AND OBJECTIVE BOX
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     Mr. Mackenzie was recently admitted to [facility] on         Allergies:  Lialda (Hives)    Home Medications:    · 	mesalamine 500 mg oral capsule, extended release: 2 cap(s) orally 4 times a day  · 	folic acid 1 mg oral tablet: 1 tab(s) orally once a day  · 	pantoprazole 40 mg oral delayed release tablet: 1 tab(s) orally once a day (before a meal)  · 	mesalamine 4 g/60 mL rectal enema: 60 milliliter(s) rectal once a day (at bedtime)  ·      ocular lubricant ophthalmic solution: 1 drop(s) to each affected eye every 8 hours, As Needed    Hospital Medications:    PMHX/PSHX:  Polyp of colon, unspecified part of colon, unspecified type  Condyloma  Herpes simplex type 2 infection  Herpes labialis  Ulcerative colitis  History of tracheostomy as a child  S/P knee surgery    Family history:  Family history of hypertension  Family history of cerebrovascular accident (Grandparent)  Family history of diabetes mellitus (Grandparent, Uncle)  Family history of hypertension    Denies family history of colon cancer/polyps, stomach cancer/polyps, pancreatic cancer/masses, liver cancer/disease, ovarian cancer and endometrial cancer.     Social History:   Tob: Denies  EtOH: Occasional social use  Illicit Drugs: Denies    ROS:     General:  No fevers, chills, night sweats, + fatigue,   Eyes:  Good vision, no reported pain  ENT:  No sore throat, pain, runny nose, dysphagia  CV:  No pain, palpitations, hypo/hypertension  Pulm:  No dyspnea, cough, tachypnea  GI:  + pain, + nausea, + vomiting, + diarrhea, No constipation, No weight loss, No fever, No pruritis, + rectal bleeding, No tarry stools, No dysphagia,  :  No pain, bleeding, incontinence, nocturia  Muscle:  No pain, weakness  Neuro:  No weakness, memory problems  Psych:  + fatigue, insomnia, mood problems, depression  Endocrine:  No polyuria, polydipsia, cold/heat intolerance  Heme:  No petechiae, ecchymosis, easy bruisability  Skin:  No rash, tattoos, scars, edema    PHYSICAL EXAM:     GENERAL:  No acute distress  HEENT:  Normocephalic/atraumatic, no scleral icterus  CHEST:  Clear to auscultation bilaterally, no wheezes/rales/ronchi, no accessory muscle use  HEART:  Regular rate and rhythm, no murmurs/rubs/gallops  ABDOMEN:  Soft, non-tender, non-distended, normoactive bowel sounds,  no masses, no hepato-splenomegaly, no signs of chronic liver disease  EXTREMITIES: No cyanosis, clubbing, or edema  RECTAL: No masses, anal fissures, or external hemorrhoids. Yellow-brown stool noted in rectal vault, with no bright red blood.  SKIN:  No rash/erythema/ecchymoses/petechiae/wounds/abscess/warm/dry  NEURO:  Alert and oriented x 3    Vital Signs:  Vital Signs Last 24 Hrs  T(C): 37 (24 Aug 2018 10:34), Max: 37 (24 Aug 2018 10:34)  T(F): 98.6 (24 Aug 2018 10:34), Max: 98.6 (24 Aug 2018 10:34)  HR: 72 (24 Aug 2018 14:06) (72 - 85)  BP: 100/56 (24 Aug 2018 16:34) (98/73 - 124/73)  BP(mean): --  RR: 12 (24 Aug 2018 14:06) (12 - 16)  SpO2: 97% (24 Aug 2018 14:06) (97% - 98%)    LABS:                        13.4   11.09 )-----------( 302      ( 24 Aug 2018 14:00 )             41.2     Mean Cell Volume: 85.5 fL (08-24-18 @ 14:00)    08-24    137  |  99  |  11  ----------------------------<  95  4.7   |  24  |  0.75    Ca    8.9      24 Aug 2018 14:00  Mg     2.2     08-24    TPro  6.3  /  Alb  3.3  /  TBili  0.3  /  DBili  x   /  AST  26  /  ALT  37  /  AlkPhos  27<L>  08-24    LIVER FUNCTIONS - ( 24 Aug 2018 14:00 )  Alb: 3.3 g/dL / Pro: 6.3 g/dL / ALK PHOS: 27 u/L / ALT: 37 u/L / AST: 26 u/L / GGT: x           PT/INR - ( 24 Aug 2018 14:00 )   PT: 12.5 SEC;   INR: 1.12          PTT - ( 24 Aug 2018 14:00 )  PTT:32.2 SEC    Amylase Serum--      Lipase serum14.2       Ammonia--               13.4   11.09 )-----------( 302      ( 24 Aug 2018 14:00 )             41.2     Imaging:    No new imaging Chief Complaint: Bloody diarrhea    HPI: 38 y/o M w/ 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     Mr. Mackenzie was recently admitted to [facility] on 8/9 to 8/16 for N/V, abdominal pain, and diarrhea, and was found to have enteropathogenic diarrhea. The patient was treated with Ciprofloxacin x 5 days with improvement in his diarrhea from 8 to 10 times daily to 3 times daily. The patient states that his diarrhea worsened the day following discharge and has been progressively worsening for the past week. He endorses 10 to 12 episodes of diarrhea daily for the past 3 to 5 days with occasional episodes of bloody diarrhea with streaks of bright red blood mixed in with his stool. The patient endorses decreased appetite and one episodes of non-bloody bilious vomiting yesterday. He endorses worsening abdominal pain which is not improved or worsened with meals. He otherwise denies fevers/chills, lightheadedness, palpitations, shortness of breath, difficulty swallowing, painful swelling. He denies eye pain, eye redness, and skin rashes. He last took his scheduled dose of gomilimumab earlier this week.     Allergies:  Lialda (Hives)    Home Medications:    · 	mesalamine 500 mg oral capsule, extended release: 2 cap(s) orally 4 times a day  · 	folic acid 1 mg oral tablet: 1 tab(s) orally once a day  · 	pantoprazole 40 mg oral delayed release tablet: 1 tab(s) orally once a day (before a meal)  · 	mesalamine 4 g/60 mL rectal enema: 60 milliliter(s) rectal once a day (at bedtime)  ·      ocular lubricant ophthalmic solution: 1 drop(s) to each affected eye every 8 hours, As Needed    Hospital Medications:    PMHX/PSHX:  Polyp of colon, unspecified part of colon, unspecified type  Condyloma  Herpes simplex type 2 infection  Herpes labialis  Ulcerative colitis  History of tracheostomy as a child  S/P knee surgery    Family history:  Family history of hypertension  Family history of cerebrovascular accident (Grandparent)  Family history of diabetes mellitus (Grandparent, Uncle)  Family history of hypertension    Denies family history of colon cancer/polyps, stomach cancer/polyps, pancreatic cancer/masses, liver cancer/disease, ovarian cancer and endometrial cancer.     Social History:   Tob: Denies  EtOH: Occasional social use  Illicit Drugs: Denies    ROS:     General:  No fevers, chills, night sweats, + fatigue,   Eyes:  Good vision, no reported pain  ENT:  No sore throat, pain, runny nose, dysphagia  CV:  No pain, palpitations, hypo/hypertension  Pulm:  No dyspnea, cough, tachypnea  GI:  + pain, + nausea, + vomiting, + diarrhea, No constipation, No weight loss, No fever, No pruritis, + rectal bleeding, No tarry stools, No dysphagia,  :  No pain, bleeding, incontinence, nocturia  Muscle:  No pain, weakness  Neuro:  No weakness, memory problems  Psych:  + fatigue, insomnia, mood problems, depression  Endocrine:  No polyuria, polydipsia, cold/heat intolerance  Heme:  No petechiae, ecchymosis, easy bruisability  Skin:  No rash, tattoos, scars, edema    PHYSICAL EXAM:     GENERAL:  No acute distress  HEENT:  Normocephalic/atraumatic, no scleral icterus  CHEST:  Clear to auscultation bilaterally, no wheezes/rales/ronchi, no accessory muscle use  HEART:  Regular rate and rhythm, no murmurs/rubs/gallops  ABDOMEN:  Soft, non-tender, non-distended, normoactive bowel sounds,  no masses, no hepato-splenomegaly, no signs of chronic liver disease  EXTREMITIES: No cyanosis, clubbing, or edema  RECTAL: No masses, anal fissures, or external hemorrhoids. Yellow-brown stool noted in rectal vault, with no bright red blood.  SKIN:  No rash/erythema/ecchymoses/petechiae/wounds/abscess/warm/dry  NEURO:  Alert and oriented x 3    Vital Signs:  Vital Signs Last 24 Hrs  T(C): 37 (24 Aug 2018 10:34), Max: 37 (24 Aug 2018 10:34)  T(F): 98.6 (24 Aug 2018 10:34), Max: 98.6 (24 Aug 2018 10:34)  HR: 72 (24 Aug 2018 14:06) (72 - 85)  BP: 100/56 (24 Aug 2018 16:34) (98/73 - 124/73)  BP(mean): --  RR: 12 (24 Aug 2018 14:06) (12 - 16)  SpO2: 97% (24 Aug 2018 14:06) (97% - 98%)    LABS:                        13.4   11.09 )-----------( 302      ( 24 Aug 2018 14:00 )             41.2     Mean Cell Volume: 85.5 fL (08-24-18 @ 14:00)    08-24    137  |  99  |  11  ----------------------------<  95  4.7   |  24  |  0.75    Ca    8.9      24 Aug 2018 14:00  Mg     2.2     08-24    TPro  6.3  /  Alb  3.3  /  TBili  0.3  /  DBili  x   /  AST  26  /  ALT  37  /  AlkPhos  27<L>  08-24    LIVER FUNCTIONS - ( 24 Aug 2018 14:00 )  Alb: 3.3 g/dL / Pro: 6.3 g/dL / ALK PHOS: 27 u/L / ALT: 37 u/L / AST: 26 u/L / GGT: x           PT/INR - ( 24 Aug 2018 14:00 )   PT: 12.5 SEC;   INR: 1.12          PTT - ( 24 Aug 2018 14:00 )  PTT:32.2 SEC    Amylase Serum--      Lipase serum14.2       Ammonia--               13.4   11.09 )-----------( 302      ( 24 Aug 2018 14:00 )             41.2     Imaging:    No new imaging Chief Complaint: Bloody diarrhea    HPI: 38 y/o M w/ 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     Mr. Mackenzie was recently admitted to [facility] on 8/9 to 8/16 for N/V, abdominal pain, and diarrhea, and was found to have enteropathogenic diarrhea. The patient was treated with Ciprofloxacin x 5 days with improvement in his diarrhea from 8 to 10 times daily to 3 times daily. The patient states that his diarrhea worsened the day following discharge and has been progressively worsening for the past week. He endorses 10 to 12 episodes of diarrhea daily for the past 3 to 5 days with occasional episodes of bloody diarrhea with streaks of bright red blood mixed in with his stool. The patient endorses decreased appetite and one episodes of non-bloody bilious vomiting yesterday. He endorses worsening abdominal pain which is not improved or worsened with meals. He otherwise denies fevers/chills, lightheadedness, palpitations, shortness of breath, difficulty swallowing, painful swelling. He denies eye pain, eye redness, and skin rashes. He last took his scheduled dose of gomilimumab earlier this week.     Patient underwent flexible sigmoidoscopy on 8/10/18 with biopsy showing active colitis, but negative for CMV on biopsy and on viral culture.     Allergies:  Lialda (Hives)    Home Medications:    · 	mesalamine 500 mg oral capsule, extended release: 2 cap(s) orally 4 times a day  · 	folic acid 1 mg oral tablet: 1 tab(s) orally once a day  · 	pantoprazole 40 mg oral delayed release tablet: 1 tab(s) orally once a day (before a meal)  · 	mesalamine 4 g/60 mL rectal enema: 60 milliliter(s) rectal once a day (at bedtime)  ·      ocular lubricant ophthalmic solution: 1 drop(s) to each affected eye every 8 hours, As Needed    Hospital Medications:    PMHX/PSHX:  Polyp of colon, unspecified part of colon, unspecified type  Condyloma  Herpes simplex type 2 infection  Herpes labialis  Ulcerative colitis  History of tracheostomy as a child  S/P knee surgery    Family history:  Family history of hypertension  Family history of cerebrovascular accident (Grandparent)  Family history of diabetes mellitus (Grandparent, Uncle)  Family history of hypertension    Denies family history of colon cancer/polyps, stomach cancer/polyps, pancreatic cancer/masses, liver cancer/disease, ovarian cancer and endometrial cancer.     Social History:   Tob: Denies  EtOH: Occasional social use  Illicit Drugs: Denies    ROS:     General:  No fevers, chills, night sweats, + fatigue,   Eyes:  Good vision, no reported pain  ENT:  No sore throat, pain, runny nose, dysphagia  CV:  No pain, palpitations, hypo/hypertension  Pulm:  No dyspnea, cough, tachypnea  GI:  + pain, + nausea, + vomiting, + diarrhea, No constipation, No weight loss, No fever, No pruritis, + rectal bleeding, No tarry stools, No dysphagia,  :  No pain, bleeding, incontinence, nocturia  Muscle:  No pain, weakness  Neuro:  No weakness, memory problems  Psych:  + fatigue, insomnia, mood problems, depression  Endocrine:  No polyuria, polydipsia, cold/heat intolerance  Heme:  No petechiae, ecchymosis, easy bruisability  Skin:  No rash, tattoos, scars, edema    PHYSICAL EXAM:     GENERAL:  No acute distress  HEENT:  Normocephalic/atraumatic, no scleral icterus  CHEST:  Clear to auscultation bilaterally, no wheezes/rales/ronchi, no accessory muscle use  HEART:  Regular rate and rhythm, no murmurs/rubs/gallops  ABDOMEN:  Soft, non-tender, non-distended, normoactive bowel sounds,  no masses, no hepato-splenomegaly, no signs of chronic liver disease  EXTREMITIES: No cyanosis, clubbing, or edema  RECTAL: No masses, anal fissures, or external hemorrhoids. Yellow-brown stool noted in rectal vault, with no bright red blood.  SKIN:  No rash/erythema/ecchymoses/petechiae/wounds/abscess/warm/dry  NEURO:  Alert and oriented x 3    Vital Signs:  Vital Signs Last 24 Hrs  T(C): 37 (24 Aug 2018 10:34), Max: 37 (24 Aug 2018 10:34)  T(F): 98.6 (24 Aug 2018 10:34), Max: 98.6 (24 Aug 2018 10:34)  HR: 72 (24 Aug 2018 14:06) (72 - 85)  BP: 100/56 (24 Aug 2018 16:34) (98/73 - 124/73)  BP(mean): --  RR: 12 (24 Aug 2018 14:06) (12 - 16)  SpO2: 97% (24 Aug 2018 14:06) (97% - 98%)    LABS:                        13.4   11.09 )-----------( 302      ( 24 Aug 2018 14:00 )             41.2     Mean Cell Volume: 85.5 fL (08-24-18 @ 14:00)    08-24    137  |  99  |  11  ----------------------------<  95  4.7   |  24  |  0.75    Ca    8.9      24 Aug 2018 14:00  Mg     2.2     08-24    TPro  6.3  /  Alb  3.3  /  TBili  0.3  /  DBili  x   /  AST  26  /  ALT  37  /  AlkPhos  27<L>  08-24    LIVER FUNCTIONS - ( 24 Aug 2018 14:00 )  Alb: 3.3 g/dL / Pro: 6.3 g/dL / ALK PHOS: 27 u/L / ALT: 37 u/L / AST: 26 u/L / GGT: x           PT/INR - ( 24 Aug 2018 14:00 )   PT: 12.5 SEC;   INR: 1.12          PTT - ( 24 Aug 2018 14:00 )  PTT:32.2 SEC    Amylase Serum--      Lipase serum14.2       Ammonia--               13.4   11.09 )-----------( 302      ( 24 Aug 2018 14:00 )             41.2     Imaging:    No new imaging

## 2018-08-24 NOTE — H&P ADULT - HISTORY OF PRESENT ILLNESS
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     recently admitted to [facility] on 8/9 to 8/16 for N/V, abdominal pain, and diarrhea, and was found to have enteropathogenic diarrhea. The patient was treated with Ciprofloxacin x 5 days with improvement in his diarrhea from 8 to 10 times daily to 3 times daily. The patient states that his diarrhea worsened the day following discharge and has been progressively worsening for the past week. Patient had developed bloody diarrhaea upon the last few days of hospitalization and told to follow up as outpatient. Now patient w/ worsening bloody diarrhea of note patient does endorse eating asparagus of which on that day had 10 episodes of urgency w/ diarrhea. Feels overall fatigued and weak, decrease appetite and presumed to have some weight loss.      On recent admission Patient underwent flexible sigmoidoscopy on 8/10/18 with biopsy showing active colitis, but negative for CMV on biopsy and on viral culture.    Denies fevers, chills or night sweats, one episode of nausea w/ non-bloody non bilious-vomiting.     in the ER: VS /56 Temp 98.6 HR 85-72 RR16 SPO2 98%

## 2018-08-24 NOTE — ED PROVIDER NOTE - MEDICAL DECISION MAKING DETAILS
pt with known colitis, recently inpatient for exacerbation in the sitting of E coli, in ED with bloody diarrhea and inability to tolerate PO; uncomfortable appearing but nontoxic; will check for C diff, treat for colitis (pt states steroids improve symptoms) and hydrate and check labs; likely admission if pt cannot tolerate PO

## 2018-08-24 NOTE — H&P ADULT - ATTENDING COMMENTS
Mr. Mackenzie is a 36 y/o male with indeterminate colitis, recently admitted from 8/9 to 8/16 and diagnosed at that time with EPEC. Now the patient presents again complaining of diarrhea, ~12 episodes a day with occasional blood. GI consulted.     -Will follow up with GI recommendations (ESR, CRP, fecal calprotectin, C.Diff. GI-PCR and stool Cx, will hold steroids pending results)  -Diet will be advanced accordingly    Neville Parker MD  Hospitalist

## 2018-08-24 NOTE — H&P ADULT - NSHPSOCIALHISTORY_GEN_ALL_CORE
[facility]  now on medical leave, lives w/ mother, rarely eTOH ingestion glass of wine, denies Tobacco use, previous marijuana use "many years ago"

## 2018-08-24 NOTE — ED ADULT NURSE NOTE - CHIEF COMPLAINT QUOTE
Pt c/o generalized abd pain/diarrhea x 2 weeks/NVD x 3 days, seen and dcd here 8 days ago dx w colitis w same symptoms.

## 2018-08-24 NOTE — ED ADULT NURSE NOTE - NSIMPLEMENTINTERV_GEN_ALL_ED
Implemented All Universal Safety Interventions:  [place] to call system. Call bell, personal items and telephone within reach. Instruct patient to call for assistance. Room bathroom lighting operational. Non-slip footwear when patient is off stretcher. Physically safe environment: no spills, clutter or unnecessary equipment. Stretcher in lowest position, wheels locked, appropriate side rails in place.

## 2018-08-24 NOTE — ED PROVIDER NOTE - OBJECTIVE STATEMENT
Pt w/ PMH of "indeterminant colitis" presents to ED complaining of generalized abdominal pain and bloody/mucus diarrhea for the past week. Pt was recently admitted at [facility] for the infectious colitis w/ enteropathogenic E. coil (8/9-8/16) and was discharged w/ cipro and oxycodone. Pt had a flex sig on 8/10 which showed "active colitis w/ features of chronicity". Pt says right when he left the hospital, he noticed blood and mucus in his stool. Pt relates multiple episodes of diarrhea which is usually correlated to how much he is eating, reports up to 12-15 episodes/day. Pt reports about 10 lb weight loss over the past month. Pt was diagnosed w/ colitis 19 years ago, says he has had multiple hospital admissions. Pt went to [facility] in 2015, which is where he was diagnosed with "indeterminant colitis". Pt says he vomited once yesterday. Denies fevers/chills/chest pain/SOB/recent travel.

## 2018-08-24 NOTE — ED PROVIDER NOTE - ATTENDING CONTRIBUTION TO CARE
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  Was recently inpatient for similar, has been compliant with treatment but continues to have abdominal pain and multiple daily episodes of bloody diarrhea.  Unable to tolerate PO because when he eats he has bloody diarrhea.  Yesterday had vomit x 1 but nonbloody.  On exam pt uncomfortable appearing but nontoxic, in NAD, heart RRR, lungs CTAB, abd NTND, extremities without swelling, strength 5/5 in all extremities and skin without rash.

## 2018-08-24 NOTE — H&P ADULT - ASSESSMENT
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

## 2018-08-24 NOTE — ED PROVIDER NOTE - NS ED ROS FT
GENERAL: No fever or chills  EYES: no change in vision  HEENT: no trouble swallowing or speaking  CARDIAC: no chest pain  PULMONARY: no cough or SOB  GI: +abdominal pain, +nausea, +vomiting, +diarrhea (w/ blood and mucus)  : No changes in urination  SKIN: no rashes  NEURO: no headache  MSK: No joint pain

## 2018-08-24 NOTE — ED PROVIDER NOTE - PHYSICAL EXAMINATION
Gen: AAOx3, non-toxic  Head: NCAT  HEENT: EOMI, oral mucosa moist, normal conjunctiva  Lung: CTAB, no respiratory distress, no wheezes/rhonchi/rales B/L, speaking in full sentences  CV: RRR, no murmurs, rubs or gallops  Abd: soft, NTND, +guarding, no CVA tenderness  MSK: no visible deformities  Neuro: No focal sensory or motor deficits  Skin: Warm, well perfused, no rash  Psych: normal affect

## 2018-08-24 NOTE — H&P ADULT - PROBLEM SELECTOR PLAN 2
-insetting of acute on chronic colitis   -pain management w/ oral oxycodone 5mG IR q4 PRN, IV morphine 1mG q6 PRN

## 2018-08-25 ENCOUNTER — TRANSCRIPTION ENCOUNTER (OUTPATIENT)
Age: 38
End: 2018-08-25

## 2018-08-25 LAB
BASOPHILS # BLD AUTO: 0.04 K/UL — SIGNIFICANT CHANGE UP (ref 0–0.2)
BASOPHILS NFR BLD AUTO: 0.3 % — SIGNIFICANT CHANGE UP (ref 0–2)
BUN SERPL-MCNC: 11 MG/DL — SIGNIFICANT CHANGE UP (ref 7–23)
C DIFF TOX GENS STL QL NAA+PROBE: SIGNIFICANT CHANGE UP
CALCIUM SERPL-MCNC: 8.6 MG/DL — SIGNIFICANT CHANGE UP (ref 8.4–10.5)
CHLORIDE SERPL-SCNC: 102 MMOL/L — SIGNIFICANT CHANGE UP (ref 98–107)
CO2 SERPL-SCNC: 23 MMOL/L — SIGNIFICANT CHANGE UP (ref 22–31)
CREAT SERPL-MCNC: 0.67 MG/DL — SIGNIFICANT CHANGE UP (ref 0.5–1.3)
EOSINOPHIL # BLD AUTO: 0.35 K/UL — SIGNIFICANT CHANGE UP (ref 0–0.5)
EOSINOPHIL NFR BLD AUTO: 3 % — SIGNIFICANT CHANGE UP (ref 0–6)
GLUCOSE SERPL-MCNC: 93 MG/DL — SIGNIFICANT CHANGE UP (ref 70–99)
HCT VFR BLD CALC: 36.4 % — LOW (ref 39–50)
HGB BLD-MCNC: 11.9 G/DL — LOW (ref 13–17)
IMM GRANULOCYTES # BLD AUTO: 0.06 # — SIGNIFICANT CHANGE UP
IMM GRANULOCYTES NFR BLD AUTO: 0.5 % — SIGNIFICANT CHANGE UP (ref 0–1.5)
LYMPHOCYTES # BLD AUTO: 2.42 K/UL — SIGNIFICANT CHANGE UP (ref 1–3.3)
LYMPHOCYTES # BLD AUTO: 20.6 % — SIGNIFICANT CHANGE UP (ref 13–44)
MAGNESIUM SERPL-MCNC: 1.9 MG/DL — SIGNIFICANT CHANGE UP (ref 1.6–2.6)
MCHC RBC-ENTMCNC: 27.7 PG — SIGNIFICANT CHANGE UP (ref 27–34)
MCHC RBC-ENTMCNC: 32.7 % — SIGNIFICANT CHANGE UP (ref 32–36)
MCV RBC AUTO: 84.8 FL — SIGNIFICANT CHANGE UP (ref 80–100)
MONOCYTES # BLD AUTO: 1.56 K/UL — HIGH (ref 0–0.9)
MONOCYTES NFR BLD AUTO: 13.3 % — SIGNIFICANT CHANGE UP (ref 2–14)
NEUTROPHILS # BLD AUTO: 7.32 K/UL — SIGNIFICANT CHANGE UP (ref 1.8–7.4)
NEUTROPHILS NFR BLD AUTO: 62.3 % — SIGNIFICANT CHANGE UP (ref 43–77)
NRBC # FLD: 0 — SIGNIFICANT CHANGE UP
PHOSPHATE SERPL-MCNC: 3.7 MG/DL — SIGNIFICANT CHANGE UP (ref 2.5–4.5)
PLATELET # BLD AUTO: 307 K/UL — SIGNIFICANT CHANGE UP (ref 150–400)
PMV BLD: 9.4 FL — SIGNIFICANT CHANGE UP (ref 7–13)
POTASSIUM SERPL-MCNC: 4.1 MMOL/L — SIGNIFICANT CHANGE UP (ref 3.5–5.3)
POTASSIUM SERPL-SCNC: 4.1 MMOL/L — SIGNIFICANT CHANGE UP (ref 3.5–5.3)
PROCALCITONIN SERPL-MCNC: 0.04 NG/ML — SIGNIFICANT CHANGE UP (ref 0.02–0.1)
RBC # BLD: 4.29 M/UL — SIGNIFICANT CHANGE UP (ref 4.2–5.8)
RBC # FLD: 13.7 % — SIGNIFICANT CHANGE UP (ref 10.3–14.5)
SODIUM SERPL-SCNC: 138 MMOL/L — SIGNIFICANT CHANGE UP (ref 135–145)
WBC # BLD: 11.75 K/UL — HIGH (ref 3.8–10.5)
WBC # FLD AUTO: 11.75 K/UL — HIGH (ref 3.8–10.5)

## 2018-08-25 PROCEDURE — 99232 SBSQ HOSP IP/OBS MODERATE 35: CPT | Mod: GC

## 2018-08-25 RX ORDER — ONDANSETRON 8 MG/1
4 TABLET, FILM COATED ORAL ONCE
Qty: 0 | Refills: 0 | Status: COMPLETED | OUTPATIENT
Start: 2018-08-25 | End: 2018-08-25

## 2018-08-25 RX ORDER — ACETAMINOPHEN 500 MG
650 TABLET ORAL EVERY 6 HOURS
Qty: 0 | Refills: 0 | Status: DISCONTINUED | OUTPATIENT
Start: 2018-08-25 | End: 2018-08-27

## 2018-08-25 RX ORDER — ACETAMINOPHEN 500 MG
1000 TABLET ORAL ONCE
Qty: 0 | Refills: 0 | Status: COMPLETED | OUTPATIENT
Start: 2018-08-25 | End: 2018-08-25

## 2018-08-25 RX ORDER — ACETAMINOPHEN 500 MG
1000 TABLET ORAL ONCE
Qty: 0 | Refills: 0 | Status: DISCONTINUED | OUTPATIENT
Start: 2018-08-25 | End: 2018-08-25

## 2018-08-25 RX ADMIN — ONDANSETRON 4 MILLIGRAM(S): 8 TABLET, FILM COATED ORAL at 06:04

## 2018-08-25 RX ADMIN — Medication 400 MILLIGRAM(S): at 21:02

## 2018-08-25 RX ADMIN — OXYCODONE HYDROCHLORIDE 5 MILLIGRAM(S): 5 TABLET ORAL at 05:00

## 2018-08-25 RX ADMIN — ONDANSETRON 4 MILLIGRAM(S): 8 TABLET, FILM COATED ORAL at 23:23

## 2018-08-25 RX ADMIN — Medication 1000 MILLIGRAM(S): at 21:17

## 2018-08-25 RX ADMIN — MORPHINE SULFATE 2 MILLIGRAM(S): 50 CAPSULE, EXTENDED RELEASE ORAL at 15:00

## 2018-08-25 RX ADMIN — OXYCODONE HYDROCHLORIDE 5 MILLIGRAM(S): 5 TABLET ORAL at 10:10

## 2018-08-25 RX ADMIN — OXYCODONE HYDROCHLORIDE 5 MILLIGRAM(S): 5 TABLET ORAL at 09:15

## 2018-08-25 RX ADMIN — MORPHINE SULFATE 2 MILLIGRAM(S): 50 CAPSULE, EXTENDED RELEASE ORAL at 14:40

## 2018-08-25 RX ADMIN — ONDANSETRON 4 MILLIGRAM(S): 8 TABLET, FILM COATED ORAL at 16:56

## 2018-08-25 RX ADMIN — Medication 1 MILLIGRAM(S): at 12:45

## 2018-08-25 RX ADMIN — OXYCODONE HYDROCHLORIDE 5 MILLIGRAM(S): 5 TABLET ORAL at 04:03

## 2018-08-25 NOTE — DISCHARGE NOTE ADULT - PROVIDER TOKENS
TOKEN:'[id number]' TOKEN:'[id number]',FREE:[LAST:[[facility]],PHONE:[phone]:[(   [phone]    -],ADDRESS:[[facility] [place] [address]]]

## 2018-08-25 NOTE — DISCHARGE NOTE ADULT - ADDITIONAL INSTRUCTIONS
1. Please follow up with Dr. Brannon Garces ([phone]) within 1 week of discharge. 1. Please follow up with Dr. Brannon Garces ([phone]) within 1 week of discharge. Alternatively, you can follow up with a general internist at [facility] ([phone]) if you have difficulty setting up a schedule with Dr. Garces.  2. You are restarted on your home medications - Apriso 0.325g every day, Canasa 1 application twice a day, and Simponi 100 mg/mL 1 dose once in every 4 weeks. Please continue with your current medication regimen as directed.

## 2018-08-25 NOTE — PROGRESS NOTE ADULT - PROBLEM SELECTOR PLAN 1
- History of inderterminate colitis with recent admission for EPEC. Infectious colitis vs exacerbation of existing chronic colitis   - Monitor stool count   - Per GI, will check C. diff, GI PCT stool, stool OVA/parasite, ESR, CRP, fecal calprotectin, procalcitonin   - S/p IV fluid hydration w/ NS @100cc/hr stop after 10 hrs.  - Hold steroids pending negative C-diff PCR and GI-PCR  - Clear liquid diet for now. Advance diet as tolerated - History of inderterminate colitis with recent admission for EPEC. Infectious colitis vs exacerbation of existing chronic colitis   - Monitor stool count   - Per GI, will check GI PCT stool, stool OVA/parasite, ESR, CRP, fecal calprotectin, procalcitonin  - C. diff neg.  - Clear liquid diet for now. 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 Infectious colitis vs exacerbation of existing chronic colitis   - Monitor stool count   - Per GI, will check GI PCT stool, stool OVA/parasite, ESR, CRP, fecal calprotectin, procalcitonin  - C. diff neg.  - Clear liquid diet for now. Advance diet as tolerated

## 2018-08-25 NOTE — DISCHARGE NOTE ADULT - MEDICATION SUMMARY - MEDICATIONS TO TAKE
I will START or STAY ON the medications listed below when I get home from the hospital:    Apriso 0.375 g oral capsule, extended release  -- 1 tab(s) by mouth once a day  -- Indication: For Colitis, indeterminate    Canasa  -- 1 applicatorful rectally 2 times a day  -- Indication: For Colitis, indeterminate    Zofran 4 mg oral tablet  -- 1 tab(s) by mouth every 6 hours PRN nausea/vomiting   -- Indication: For Nausea    Allergy (Diphenhydramine HCl) 25 mg oral capsule  -- 1 cap(s) by mouth 2 times a day, As Needed -for allergy symptoms   -- May cause drowsiness.  Alcohol may intensify this effect.  Use care when operating dangerous machinery.  Obtain medical advice before taking any non-prescription drugs as some may affect the action of this medication.    -- Indication: For Allergy    Simponi 100 mg/mL subcutaneous solution  -- 1 dose(s) subcutaneous every 4 weeks  -- Indication: For Colitis, indeterminate    MiraLax oral powder for reconstitution  -- 17 gram(s) by mouth once a day   -- Dilute this medication with liquid before administration.  It is very important that you take or use this exactly as directed.  Do not skip doses or discontinue unless directed by your doctor.    -- Indication: For Constipation    senna oral tablet  -- 2 tab(s) by mouth once a day (at bedtime), As Needed   -- Indication: For Constipation    Artificial Tears ophthalmic solution  -- 1 drop(s) to each affected eye 2 times a day, As Needed  -- Indication: For Eye care    lactobacillus acidophilus oral capsule  -- 1 tab(s) by mouth once a day  -- Indication: For Supplement    folic acid 1 mg oral tablet  -- 1 tab(s) by mouth once a day  -- Indication: For Supplement I will START or STAY ON the medications listed below when I get home from the hospital:    Apriso 0.375 g oral capsule, extended release  -- 1 tab(s) by mouth once a day  -- Indication: For Colitis, indeterminate    Canasa 1000 mg rectal suppository  -- 1 dose(s) rectally 2 times a day  -- Indication: For Colitis     Benadryl 25 mg oral capsule  -- 1 cap(s) by mouth 2 times a day, As Needed -for allergy symptoms   -- May cause drowsiness.  Alcohol may intensify this effect.  Use care when operating dangerous machinery.  Obtain medical advice before taking any non-prescription drugs as some may affect the action of this medication.    -- Indication: For Allergy    Zofran 4 mg oral tablet  -- 1 tab(s) by mouth every 6 hours PRN nausea/vomiting   -- Indication: For Nausea    famotidine 20 mg oral tablet  -- 1 tab(s) by mouth once a day   -- It is very important that you take or use this exactly as directed.  Do not skip doses or discontinue unless directed by your doctor.  Obtain medical advice before taking any non-prescription drugs as some may affect the action of this medication.    -- Indication: For Allergy    Simponi 100 mg/mL subcutaneous solution  -- 1 dose(s) subcutaneous every 4 weeks  -- Indication: For Colitis, indeterminate    MiraLax oral powder for reconstitution  -- 17 gram(s) by mouth once a day   -- Dilute this medication with liquid before administration.  It is very important that you take or use this exactly as directed.  Do not skip doses or discontinue unless directed by your doctor.    -- Indication: For Constipation    senna oral tablet  -- 2 tab(s) by mouth once a day (at bedtime), As Needed   -- Indication: For Constipation    Artificial Tears ophthalmic solution  -- 1 drop(s) to each affected eye 2 times a day, As Needed  -- Indication: For Eye care    lactobacillus acidophilus oral capsule  -- 1 tab(s) by mouth once a day  -- Indication: For Supplement    folic acid 1 mg oral tablet  -- 1 tab(s) by mouth once a day  -- Indication: For Supplement I will START or STAY ON the medications listed below when I get home from the hospital:    Canasa  -- 1 dose(s) rectally 2 times a day  -- Indication: For Colitis, indeterminate    Apriso 0.375 g oral capsule, extended release  -- 1 tab(s) by mouth once a day  -- Indication: For Colitis, indeterminate    diphenhydrAMINE 25 mg oral capsule  -- 1 tab(s) by mouth once a day   -- Indication: For As needed for allergies    Zofran 4 mg oral tablet  -- 1 tab(s) by mouth every 6 hours PRN nausea/vomiting   -- Indication: For Nausea    famotidine 20 mg oral tablet  -- 1 tab(s) by mouth once a day   -- It is very important that you take or use this exactly as directed.  Do not skip doses or discontinue unless directed by your doctor.  Obtain medical advice before taking any non-prescription drugs as some may affect the action of this medication.    -- Indication: For Allergies, as needed    Simponi 100 mg/mL subcutaneous solution  -- 1 dose(s) subcutaneous every 4 weeks  -- Indication: For Colitis, indeterminate    polyethylene glycol 3350 oral powder for reconstitution  -- 17 gram(s) by mouth once a day  -- Indication: For Constipation    senna oral tablet  -- 1 tab(s) by mouth once a day, As needed, Constipation  -- Indication: For Constipation    senna oral tablet  -- 2 tab(s) by mouth once a day  -- Indication: For Constipation    Artificial Tears ophthalmic solution  -- 1 drop(s) to each affected eye 2 times a day, As Needed  -- Indication: For Eye care    lactobacillus acidophilus oral capsule  -- 1 tab(s) by mouth once a day  -- Indication: For Supplement    folic acid 1 mg oral tablet  -- 1 tab(s) by mouth once a day  -- Indication: For Supplement

## 2018-08-25 NOTE — DISCHARGE NOTE ADULT - CARE PROVIDERS DIRECT ADDRESSES
,[email or web address]_Unknown

## 2018-08-25 NOTE — PROGRESS NOTE ADULT - PROBLEM SELECTOR PLAN 2
- Months of history of crampy abdominal pain that moves around the abdomen in the setting of acute on chronic colitis  - Pain management w/ oral oxycodone 5 mg IR Q4H PRN, IV morphine 1 mg Q6H PRN  - S/p 3 doses of golimumab, holding steroids for now   - GI recs appreciated. - Months of history of crampy abdominal pain that moves around the abdomen in the setting of acute on chronic colitis  - Pain management w/ oral oxycodone 5 mg IR Q4H PRN, IV morphine 1 mg Q6H PRN  - Zofran PO PRN for nausea   - S/p 3 doses of golimumab, holding steroids for now   - GI recs appreciated.

## 2018-08-25 NOTE — DISCHARGE NOTE ADULT - CARE PLAN
Principal Discharge DX:	Bloody diarrhea  Goal:	Please follow up with Dr. Brannon Garces ([phone]) within 1 week of discharge.  Assessment and plan of treatment:	You presented with bloody diarrhea. You were treated with IV fluid. Your blood level was closely monitored to assess the need for transfusion. Stool samples were analyzed for infection. Please follow up with Dr. Brannon Garces ([phone]) within 1 week of discharge.  Secondary Diagnosis:	Colitis, indeterminate  Goal:	Please follow up with Dr. Brannon Garces ([phone]) within 1 week of discharge.  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 You were treated with oral and IV Tylenol, oral oxycodone, and IV morphine for pain control. You were given Zofran for nausea. For colitis flare-up, you were given Solumedrol IV. Please follow up with Dr. Brannon Garces ([phone]) within 1 week of discharge. Principal Discharge DX:	Bloody diarrhea  Goal:	Please follow up with Dr. Brannon Garces ([phone]) within 1 week of discharge.  Assessment and plan of treatment:	You presented with bloody diarrhea. You were treated with IV fluid. Your blood level was closely monitored to assess the need for transfusion. Stool samples were analyzed for infection. Please follow up with Dr. Brannon Garces ([phone]) within 1 week of discharge. Alternatively, you can follow up with a general internist at [facility] ([phone]) if you have difficulty setting up a schedule with Dr. Garces.  Secondary Diagnosis:	Colitis, indeterminate  Goal:	Please follow up with Dr. Brannon Garces ([phone]) within 1 week of discharge.  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 You were treated with oral and IV Tylenol, oral oxycodone, and IV morphine for pain control. You were given Zofran for nausea. For colitis flare-up, you were given Solumedrol IV. Please follow up with Dr. Brannon Garces ([phone]) within 1 week of discharge. Alternatively, you can follow up with a general internist at [facility] ([phone]) if you have difficulty setting up a schedule with Dr. Garces. Principal Discharge DX:	Bloody diarrhea  Goal:	Please follow up with Dr. Brannon Garces ([phone]) within 1 week of discharge.  Assessment and plan of treatment:	You presented with bloody diarrhea. You were treated with IV fluid. Your blood level was closely monitored to assess the need for transfusion. Stool samples were analyzed for infection. Please follow up with Dr. Barnnon Garces ([phone]) within 1 week of discharge. Alternatively, you can follow up with a general internist at [facility] ([phone]) if you have difficulty setting up a schedule with Dr. Garces.  Secondary Diagnosis:	Colitis, indeterminate  Goal:	Please follow up with Dr. Brannon Garces ([phone]) within 1 week of discharge.  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 You were treated with oral and IV Tylenol, oral oxycodone, and IV morphine for pain control. You were given Zofran for nausea. For colitis flare-up, you were given Solumedrol IV. Please follow up with Dr. Brannon Garces ([phone]) within 1 week of discharge. Alternatively, you can follow up with a general internist at [facility] ([phone]) if you have difficulty setting up a schedule with Dr. Garces.  Secondary Diagnosis:	Rash  Goal:	Please follow up with a general internist at [facility] ([phone]) within 1-2 weeks of discharge.  Assessment and plan of treatment:	You had itch rash during your hospitalization. You were given IV and oral diphenhydramine. You are prescribed oral diphenhydramine to take by mouth up to twice a day as needed for rash and itchy skin. Please do not drive or use heavy machinery after taking this medication. If you experience difficulty breathing, lip or tongue swelling, or voice change, please do not hesitate to visit nearest emergency center or call 911. You may take this medication while you are waiting, but do not let this delay seeking medical attention. Please follow up with a general internist at [facility] ([phone]) within 1-2 weeks of discharge.

## 2018-08-25 NOTE — DISCHARGE NOTE ADULT - PATIENT PORTAL LINK FT
You can access the [facility] Patient Portal, offered by [facility], by registering with the following website: [email or web address]

## 2018-08-25 NOTE — PROGRESS NOTE ADULT - SUBJECTIVE AND OBJECTIVE BOX
Patient is a 37y old  Male who presents with a chief complaint of abdominal pain, blood in stool. diarrhea (24 Aug 2018 20:32)      SUBJECTIVE / OVERNIGHT EVENTS:  Patient was seen and examined at bedside. Endorses crampy, abdominal pain that moves around the abdomen. Pain started months ago, which he thinks is a colitis flare. No BM overnight. Passing flatus. No acute event overnight. Denies fever, chills, nausea, vomiting, SOB, headache, chest pain, or dizziness.    REVIEW OF SYSTEMS:  CONSTITUTIONAL: No fever; +fatigue  EYES: No eye pain, visual disturbances  ENMT: No difficulty hearing, tinnitus, vertigo; No sinus or throat pain  RESPIRATORY: No cough, wheezing, chills, shortness of breath  CARDIOVASCULAR: No chest pain, palpitations, dizziness  GASTROINTESTINAL: +crampy abdominal pain that moves around the abdomen; +hematochezia  GENITOURINARY: No dysuria, frequency, hematuria, or incontinence  NEUROLOGICAL: No headaches, loss of strength, numbness, or tremors  MUSCULOSKELETAL: No joint pain or swelling;     MEDICATIONS  (STANDING):  folic acid 1 milliGRAM(s) Oral daily  sodium chloride 0.9%. 1000 milliLiter(s) (100 mL/Hr) IV Continuous <Continuous>    MEDICATIONS  (PRN):  morphine  - Injectable 2 milliGRAM(s) IV Push every 6 hours PRN severe breakthrough pain  oxyCODONE    IR 5 milliGRAM(s) Oral every 4 hours PRN moderate to severe pain        CAPILLARY BLOOD GLUCOSE        I&O's Summary    Vital Signs Last 24 Hrs  T(C): 36.6 (25 Aug 2018 06:02), Max: 37.6 (24 Aug 2018 19:58)  T(F): 97.9 (25 Aug 2018 06:02), Max: 99.7 (24 Aug 2018 19:58)  HR: 65 (25 Aug 2018 06:02) (64 - 85)  BP: 98/59 (25 Aug 2018 06:02) (97/53 - 124/73)  BP(mean): --  RR: 17 (25 Aug 2018 06:02) (12 - 18)  SpO2: 97% (25 Aug 2018 06:02) (97% - 100%)    PHYSICAL EXAM:  GENERAL: NAD, well-developed  HEAD: Atraumatic, Normocephalic  EYES: EOMI, PERRLA, conjunctiva and sclera clear  NECK: Supple, No JVD  CHEST/LUNG: Clear to auscultation bilaterally; No wheezes or crackles  HEART: Regular rate and rhythm; No murmurs, rubs, or gallops  ABDOMEN: Soft, nontender, nondistended, +BS   EXTREMITIES:  2+ Peripheral Pulses, No clubbing, cyanosis, or edema  PSYCH: A&O x3    LABS:                        11.9   11.75 )-----------( 307      ( 25 Aug 2018 05:55 )             36.4         138  |  102  |  11  ----------------------------<  93  4.1   |  23  |  0.67    Ca    8.6      25 Aug 2018 05:55  Phos  3.7       Mg     1.9         TPro  6.3  /  Alb  3.3  /  TBili  0.3  /  DBili  x   /  AST  26  /  ALT  37  /  AlkPhos  27<L>      PT/INR - ( 24 Aug 2018 14:00 )   PT: 12.5 SEC;   INR: 1.12          PTT - ( 24 Aug 2018 14:00 )  PTT:32.2 SEC      Urinalysis Basic - ( 24 Aug 2018 19:35 )    Color: YELLOW / Appearance: CLEAR / S.033 / pH: 7.0  Gluc: NEGATIVE / Ketone: SMALL  / Bili: NEGATIVE / Urobili: NORMAL   Blood: NEGATIVE / Protein: SMALL / Nitrite: NEGATIVE   Leuk Esterase: NEGATIVE / RBC: 6-10 / WBC 0-2   Sq Epi: OCC / Non Sq Epi: x / Bacteria: NEGATIVE        RADIOLOGY & ADDITIONAL TESTS:    Imaging Personally Reviewed:    Consultant(s) Notes Reviewed:      Care Discussed with Consultants/Other Providers:

## 2018-08-25 NOTE — DISCHARGE NOTE ADULT - MEDICATION SUMMARY - MEDICATIONS TO STOP TAKING
I will STOP taking the medications listed below when I get home from the hospital:  None I will STOP taking the medications listed below when I get home from the hospital:    oxyCODONE-acetaminophen 5 mg-325 mg oral tablet  -- 1 tab(s) by mouth every 4 hours, As Needed moderate to severe pain MDD:6 tabs  -- Caution federal law prohibits the transfer of this drug to any person other  than the person for whom it was prescribed.  May cause drowsiness.  Alcohol may intensify this effect.  Use care when operating dangerous machinery.  This prescription cannot be refilled.  This product contains acetaminophen.  Do not use  with any other product containing acetaminophen to prevent possible liver damage.  Using more of this medication than prescribed may cause serious breathing problems.    Cipro 500 mg oral tablet  -- 1 tab(s) by mouth once a day   -- Avoid prolonged or excessive exposure to direct and/or artificial sunlight while taking this medication.  Check with your doctor before becoming pregnant.  Do not take dairy products, antacids, or iron preparations within one hour of this medication.  Finish all this medication unless otherwise directed by prescriber.  Medication should be taken with plenty of water.    Melatonin 3 mg oral tablet  -- 1 tab(s) by mouth once (at bedtime)

## 2018-08-25 NOTE — DISCHARGE NOTE ADULT - CONDITIONS AT DISCHARGE
He is stable for discharged, alert independents with his care .  He does have a generalized Rash all of the body with itching and Benadryl is given as needed. Vital Signs are stable and Instructions for going Home are discussed and given. He is stable for discharged, alert independents with his care .  He does have a generalized Rash all of the body with itching and Benadryl is given as needed. Vital Signs are stable and Instructions for going Home are discussed and given. IV removed.

## 2018-08-25 NOTE — DISCHARGE NOTE ADULT - NS AS ACTIVITY OBS
Bathing allowed/Showering allowed/Driving allowed/Walking-Outdoors allowed/Stairs allowed/Walking-Indoors allowed/Return to Work/School allowed

## 2018-08-25 NOTE — PROGRESS NOTE ADULT - ASSESSMENT
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 coli colitis, p/w persistent bloody diarrhea x2 weeks.

## 2018-08-25 NOTE — DISCHARGE NOTE ADULT - PLAN OF CARE
Please follow up with Dr. Brannon Garces ([phone]) within 1 week of discharge. You presented with bloody diarrhea. You were treated with IV fluid. Your blood level was closely monitored to assess the need for transfusion. Stool samples were analyzed for infection. Please follow up with Dr. Brannon Garces ([phone]) within 1 week of discharge. Please follow up with Dr. Brannon Garces ([phone]) within 1 week of discharge. 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 You were treated with oral and IV Tylenol, oral oxycodone, and IV morphine for pain control. You were given Zofran for nausea. For colitis flare-up, you were given Solumedrol IV. Please follow up with Dr. Brannon Garces ([phone]) within 1 week of discharge. You presented with bloody diarrhea. You were treated with IV fluid. Your blood level was closely monitored to assess the need for transfusion. Stool samples were analyzed for infection. Please follow up with Dr. Brannon Garces ([phone]) within 1 week of discharge. Alternatively, you can follow up with a general internist at [facility] ([phone]) if you have difficulty setting up a schedule with Dr. Garces. 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 You were treated with oral and IV Tylenol, oral oxycodone, and IV morphine for pain control. You were given Zofran for nausea. For colitis flare-up, you were given Solumedrol IV. Please follow up with Dr. Brannon Garces ([phone]) within 1 week of discharge. Alternatively, you can follow up with a general internist at [facility] ([phone]) if you have difficulty setting up a schedule with Dr. Garces. Please follow up with a general internist at [facility] ([phone]) within 1-2 weeks of discharge. You had itch rash during your hospitalization. You were given IV and oral diphenhydramine. You are prescribed oral diphenhydramine to take by mouth up to twice a day as needed for rash and itchy skin. Please do not drive or use heavy machinery after taking this medication. If you experience difficulty breathing, lip or tongue swelling, or voice change, please do not hesitate to visit nearest emergency center or call 911. You may take this medication while you are waiting, but do not let this delay seeking medical attention. Please follow up with a general internist at [facility] ([phone]) within 1-2 weeks of discharge.

## 2018-08-25 NOTE — DISCHARGE NOTE ADULT - CARE PROVIDER_API CALL
Brannon Garces), Gastroenterology; Internal Medicine  [address] [place] [address]  Phone: [phone]  Fax: [phone] Brannon Garces), Gastroenterology; Internal Medicine  [address] [place] [address]  Phone: [phone]  Fax: [phone]    [facility],   [address] [place] [address]  Phone: [phone]  Fax: (       -

## 2018-08-25 NOTE — DISCHARGE NOTE ADULT - MEDICATION SUMMARY - MEDICATIONS TO CHANGE
I will SWITCH the dose or number of times a day I take the medications listed below when I get home from the hospital:    oxyCODONE-acetaminophen 5 mg-325 mg oral tablet  -- 1 tab(s) by mouth every 4 hours, As Needed moderate to severe pain MDD:6 tabs  -- Caution federal law prohibits the transfer of this drug to any person other  than the person for whom it was prescribed.  May cause drowsiness.  Alcohol may intensify this effect.  Use care when operating dangerous machinery.  This prescription cannot be refilled.  This product contains acetaminophen.  Do not use  with any other product containing acetaminophen to prevent possible liver damage.  Using more of this medication than prescribed may cause serious breathing problems.    Cipro 500 mg oral tablet  -- 1 tab(s) by mouth once a day   -- Avoid prolonged or excessive exposure to direct and/or artificial sunlight while taking this medication.  Check with your doctor before becoming pregnant.  Do not take dairy products, antacids, or iron preparations within one hour of this medication.  Finish all this medication unless otherwise directed by prescriber.  Medication should be taken with plenty of water. I will SWITCH the dose or number of times a day I take the medications listed below when I get home from the hospital:  None

## 2018-08-26 LAB
ALBUMIN SERPL ELPH-MCNC: 2.8 G/DL — LOW (ref 3.3–5)
ALP SERPL-CCNC: 25 U/L — LOW (ref 40–120)
ALT FLD-CCNC: 19 U/L — SIGNIFICANT CHANGE UP (ref 4–41)
AST SERPL-CCNC: 13 U/L — SIGNIFICANT CHANGE UP (ref 4–40)
BASOPHILS # BLD AUTO: 0.08 K/UL — SIGNIFICANT CHANGE UP (ref 0–0.2)
BASOPHILS NFR BLD AUTO: 0.8 % — SIGNIFICANT CHANGE UP (ref 0–2)
BILIRUB SERPL-MCNC: 0.4 MG/DL — SIGNIFICANT CHANGE UP (ref 0.2–1.2)
BUN SERPL-MCNC: 10 MG/DL — SIGNIFICANT CHANGE UP (ref 7–23)
CALCIUM SERPL-MCNC: 8.3 MG/DL — LOW (ref 8.4–10.5)
CHLORIDE SERPL-SCNC: 100 MMOL/L — SIGNIFICANT CHANGE UP (ref 98–107)
CO2 SERPL-SCNC: 24 MMOL/L — SIGNIFICANT CHANGE UP (ref 22–31)
CREAT SERPL-MCNC: 0.75 MG/DL — SIGNIFICANT CHANGE UP (ref 0.5–1.3)
EOSINOPHIL # BLD AUTO: 0.94 K/UL — HIGH (ref 0–0.5)
EOSINOPHIL NFR BLD AUTO: 9 % — HIGH (ref 0–6)
GLUCOSE SERPL-MCNC: 86 MG/DL — SIGNIFICANT CHANGE UP (ref 70–99)
HCT VFR BLD CALC: 40.3 % — SIGNIFICANT CHANGE UP (ref 39–50)
HGB BLD-MCNC: 13.3 G/DL — SIGNIFICANT CHANGE UP (ref 13–17)
IMM GRANULOCYTES # BLD AUTO: 0.03 # — SIGNIFICANT CHANGE UP
IMM GRANULOCYTES NFR BLD AUTO: 0.3 % — SIGNIFICANT CHANGE UP (ref 0–1.5)
LYMPHOCYTES # BLD AUTO: 2.76 K/UL — SIGNIFICANT CHANGE UP (ref 1–3.3)
LYMPHOCYTES # BLD AUTO: 26.3 % — SIGNIFICANT CHANGE UP (ref 13–44)
MAGNESIUM SERPL-MCNC: 1.8 MG/DL — SIGNIFICANT CHANGE UP (ref 1.6–2.6)
MCHC RBC-ENTMCNC: 28.2 PG — SIGNIFICANT CHANGE UP (ref 27–34)
MCHC RBC-ENTMCNC: 33 % — SIGNIFICANT CHANGE UP (ref 32–36)
MCV RBC AUTO: 85.6 FL — SIGNIFICANT CHANGE UP (ref 80–100)
MONOCYTES # BLD AUTO: 1.22 K/UL — HIGH (ref 0–0.9)
MONOCYTES NFR BLD AUTO: 11.6 % — SIGNIFICANT CHANGE UP (ref 2–14)
NEUTROPHILS # BLD AUTO: 5.46 K/UL — SIGNIFICANT CHANGE UP (ref 1.8–7.4)
NEUTROPHILS NFR BLD AUTO: 52 % — SIGNIFICANT CHANGE UP (ref 43–77)
NRBC # FLD: 0 — SIGNIFICANT CHANGE UP
PHOSPHATE SERPL-MCNC: 3.7 MG/DL — SIGNIFICANT CHANGE UP (ref 2.5–4.5)
PLATELET # BLD AUTO: 299 K/UL — SIGNIFICANT CHANGE UP (ref 150–400)
PMV BLD: 9.7 FL — SIGNIFICANT CHANGE UP (ref 7–13)
POTASSIUM SERPL-MCNC: 3.9 MMOL/L — SIGNIFICANT CHANGE UP (ref 3.5–5.3)
POTASSIUM SERPL-SCNC: 3.9 MMOL/L — SIGNIFICANT CHANGE UP (ref 3.5–5.3)
PROT SERPL-MCNC: 5.4 G/DL — LOW (ref 6–8.3)
RBC # BLD: 4.71 M/UL — SIGNIFICANT CHANGE UP (ref 4.2–5.8)
RBC # FLD: 13.7 % — SIGNIFICANT CHANGE UP (ref 10.3–14.5)
SODIUM SERPL-SCNC: 137 MMOL/L — SIGNIFICANT CHANGE UP (ref 135–145)
SPECIMEN SOURCE: SIGNIFICANT CHANGE UP
WBC # BLD: 10.49 K/UL — SIGNIFICANT CHANGE UP (ref 3.8–10.5)
WBC # FLD AUTO: 10.49 K/UL — SIGNIFICANT CHANGE UP (ref 3.8–10.5)

## 2018-08-26 PROCEDURE — 99232 SBSQ HOSP IP/OBS MODERATE 35: CPT | Mod: GC

## 2018-08-26 RX ADMIN — Medication 650 MILLIGRAM(S): at 21:43

## 2018-08-26 RX ADMIN — Medication 30 MILLILITER(S): at 10:49

## 2018-08-26 RX ADMIN — OXYCODONE HYDROCHLORIDE 5 MILLIGRAM(S): 5 TABLET ORAL at 04:12

## 2018-08-26 RX ADMIN — OXYCODONE HYDROCHLORIDE 5 MILLIGRAM(S): 5 TABLET ORAL at 03:19

## 2018-08-26 RX ADMIN — Medication 650 MILLIGRAM(S): at 20:47

## 2018-08-26 RX ADMIN — Medication 1 MILLIGRAM(S): at 12:48

## 2018-08-26 NOTE — PROGRESS NOTE ADULT - ASSESSMENT
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 coli colitis, p/w persistent bloody diarrhea x2 weeks.

## 2018-08-26 NOTE — PROGRESS NOTE ADULT - PROBLEM SELECTOR PLAN 3
DVT ppx: Ambulate. SCDs  IMPROVE: 0    Dispo: C- dif negative. ESR/CRP not suggest of flare with PCR pending. Reported 1BM yesterday with minimal stool output. Non bloody. Lipase not suggestive of pancreatitis.    Catarina Will  PGY2  [id number]

## 2018-08-26 NOTE — PROGRESS NOTE ADULT - PROBLEM SELECTOR PLAN 2
- Months of history of crampy abdominal pain that moves around the abdomen in the setting of acute on chronic colitis  - Pain management w/ oral oxycodone 5 mg IR Q4H PRN, IV morphine 1 mg Q6H PRN  - Zofran PO PRN for nausea   - S/p 3 doses of golimumab, holding steroids for now   - GI recs appreciated: pending PCR panel

## 2018-08-26 NOTE — PROGRESS NOTE ADULT - PROBLEM SELECTOR PLAN 1
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 Infectious colitis vs exacerbation of existing chronic colitis   - Monitor stool count: reports having 1 BM yesterday with minimal output: denies any bloody diarrhea   - C. diff neg, ESR and CRP not overtly remarkable to suggest flare. GI PCR pending   - Clear liquid diet for now. Advance diet as tolerated

## 2018-08-26 NOTE — PROGRESS NOTE ADULT - SUBJECTIVE AND OBJECTIVE BOX
Patient is a 37y old  Male who presents with a chief complaint of abdominal pain, blood in stool. diarrhea (25 Aug 2018 18:27)    SUBJECTIVE / OVERNIGHT EVENTS:  Overnight, reports having some nausea with eating. States that he was able to have some soup last night without any episodes of vomiting. States that he went to the bathroom yesterday with very minimal stool. Denies any diarrhea, blood or melena in the stool.     MEDICATIONS  (STANDING):  folic acid 1 milliGRAM(s) Oral daily  sodium chloride 0.9%. 1000 milliLiter(s) (100 mL/Hr) IV Continuous <Continuous>    MEDICATIONS  (PRN):  acetaminophen   Tablet. 650 milliGRAM(s) Oral every 6 hours PRN Mild Pain (1 - 3)  aluminum hydroxide/magnesium hydroxide/simethicone Suspension 30 milliLiter(s) Oral every 6 hours PRN Dyspepsia  morphine  - Injectable 2 milliGRAM(s) IV Push every 6 hours PRN severe breakthrough pain  oxyCODONE    IR 5 milliGRAM(s) Oral every 4 hours PRN moderate to severe pain    CAPILLARY BLOOD GLUCOSE  I&O's Summary    PHYSICAL EXAM  GENERAL: well-developed middle aged male. NAD   HEAD:  Atraumatic, Normocephalic  EYES: EOMI, PERRLA, conjunctiva and sclera clear  NECK: Supple, No JVD  CHEST/LUNG: Clear to auscultation bilaterally; No wheezes, rales or rhonchi   HEART: Regular rate and rhythm; No murmurs  ABDOMEN: Bowel sounds present. Diffuse abdominal soreness with some pain in the epigastric region on palpation.     EXTREMITIES:  2+ Peripheral Pulses, No clubbing, cyanosis, or edema  PSYCH: AAOx3  SKIN: No rashes or lesions    LABS:                        13.3   10.49 )-----------( 299      ( 26 Aug 2018 06:19 )             40.3     08-26    137  |  100  |  10  ----------------------------<  86  3.9   |  24  |  0.75    Ca    8.3<L>      26 Aug 2018 06:19  Phos  3.7     08-  Mg     1.8     08-    TPro  5.4<L>  /  Alb  2.8<L>  /  TBili  0.4  /  DBili  x   /  AST  13  /  ALT  19  /  AlkPhos  25<L>  08-26    PT/INR - ( 24 Aug 2018 14:00 )   PT: 12.5 SEC;   INR: 1.12          PTT - ( 24 Aug 2018 14:00 )  PTT:32.2 SEC      Urinalysis Basic - ( 24 Aug 2018 19:35 )    Color: YELLOW / Appearance: CLEAR / S.033 / pH: 7.0  Gluc: NEGATIVE / Ketone: SMALL  / Bili: NEGATIVE / Urobili: NORMAL   Blood: NEGATIVE / Protein: SMALL / Nitrite: NEGATIVE   Leuk Esterase: NEGATIVE / RBC: 6-10 / WBC 0-2   Sq Epi: OCC / Non Sq Epi: x / Bacteria: NEGATIVE    RADIOLOGY & ADDITIONAL TESTS:    Imaging Personally Reviewed:  Consultant(s) Notes Reviewed:    Care Discussed with Consultants/Other Providers:

## 2018-08-27 ENCOUNTER — APPOINTMENT (OUTPATIENT)
Dept: GASTROENTEROLOGY | Facility: CLINIC | Age: 38
End: 2018-08-27

## 2018-08-27 DIAGNOSIS — K59.00 CONSTIPATION, UNSPECIFIED: ICD-10-CM

## 2018-08-27 DIAGNOSIS — T78.40XA ALLERGY, UNSPECIFIED, INITIAL ENCOUNTER: ICD-10-CM

## 2018-08-27 LAB
BACTERIA STL CULT: SIGNIFICANT CHANGE UP
BUN SERPL-MCNC: 9 MG/DL — SIGNIFICANT CHANGE UP (ref 7–23)
CALCIUM SERPL-MCNC: 8.4 MG/DL — SIGNIFICANT CHANGE UP (ref 8.4–10.5)
CHLORIDE SERPL-SCNC: 104 MMOL/L — SIGNIFICANT CHANGE UP (ref 98–107)
CO2 SERPL-SCNC: 26 MMOL/L — SIGNIFICANT CHANGE UP (ref 22–31)
CREAT SERPL-MCNC: 0.71 MG/DL — SIGNIFICANT CHANGE UP (ref 0.5–1.3)
GLUCOSE SERPL-MCNC: 98 MG/DL — SIGNIFICANT CHANGE UP (ref 70–99)
HCT VFR BLD CALC: 41.5 % — SIGNIFICANT CHANGE UP (ref 39–50)
HGB BLD-MCNC: 13.6 G/DL — SIGNIFICANT CHANGE UP (ref 13–17)
MAGNESIUM SERPL-MCNC: 2.1 MG/DL — SIGNIFICANT CHANGE UP (ref 1.6–2.6)
MCHC RBC-ENTMCNC: 27.8 PG — SIGNIFICANT CHANGE UP (ref 27–34)
MCHC RBC-ENTMCNC: 32.8 % — SIGNIFICANT CHANGE UP (ref 32–36)
MCV RBC AUTO: 84.7 FL — SIGNIFICANT CHANGE UP (ref 80–100)
NRBC # FLD: 0 — SIGNIFICANT CHANGE UP
PHOSPHATE SERPL-MCNC: 4 MG/DL — SIGNIFICANT CHANGE UP (ref 2.5–4.5)
PLATELET # BLD AUTO: 323 K/UL — SIGNIFICANT CHANGE UP (ref 150–400)
PMV BLD: 9.2 FL — SIGNIFICANT CHANGE UP (ref 7–13)
POTASSIUM SERPL-MCNC: 3.6 MMOL/L — SIGNIFICANT CHANGE UP (ref 3.5–5.3)
POTASSIUM SERPL-SCNC: 3.6 MMOL/L — SIGNIFICANT CHANGE UP (ref 3.5–5.3)
RBC # BLD: 4.9 M/UL — SIGNIFICANT CHANGE UP (ref 4.2–5.8)
RBC # FLD: 13.6 % — SIGNIFICANT CHANGE UP (ref 10.3–14.5)
SODIUM SERPL-SCNC: 135 MMOL/L — SIGNIFICANT CHANGE UP (ref 135–145)
WBC # BLD: 10.97 K/UL — HIGH (ref 3.8–10.5)
WBC # FLD AUTO: 10.97 K/UL — HIGH (ref 3.8–10.5)

## 2018-08-27 PROCEDURE — 99232 SBSQ HOSP IP/OBS MODERATE 35: CPT

## 2018-08-27 PROCEDURE — 99232 SBSQ HOSP IP/OBS MODERATE 35: CPT | Mod: GC

## 2018-08-27 RX ORDER — DIPHENHYDRAMINE HCL 50 MG
10 CAPSULE ORAL ONCE
Qty: 0 | Refills: 0 | Status: COMPLETED | OUTPATIENT
Start: 2018-08-27 | End: 2018-08-27

## 2018-08-27 RX ORDER — LANOLIN ALCOHOL/MO/W.PET/CERES
3 CREAM (GRAM) TOPICAL AT BEDTIME
Qty: 0 | Refills: 0 | Status: DISCONTINUED | OUTPATIENT
Start: 2018-08-27 | End: 2018-08-28

## 2018-08-27 RX ORDER — DIPHENHYDRAMINE HCL 50 MG
25 CAPSULE ORAL ONCE
Qty: 0 | Refills: 0 | Status: COMPLETED | OUTPATIENT
Start: 2018-08-27 | End: 2018-08-27

## 2018-08-27 RX ORDER — POLYETHYLENE GLYCOL 3350 17 G/17G
17 POWDER, FOR SOLUTION ORAL DAILY
Qty: 0 | Refills: 0 | Status: DISCONTINUED | OUTPATIENT
Start: 2018-08-27 | End: 2018-08-28

## 2018-08-27 RX ORDER — DIPHENHYDRAMINE HCL 50 MG
50 CAPSULE ORAL ONCE
Qty: 0 | Refills: 0 | Status: DISCONTINUED | OUTPATIENT
Start: 2018-08-27 | End: 2018-08-27

## 2018-08-27 RX ORDER — SENNA PLUS 8.6 MG/1
1 TABLET ORAL DAILY
Qty: 0 | Refills: 0 | Status: DISCONTINUED | OUTPATIENT
Start: 2018-08-27 | End: 2018-08-28

## 2018-08-27 RX ADMIN — Medication 3 MILLIGRAM(S): at 23:06

## 2018-08-27 RX ADMIN — Medication 1 MILLIGRAM(S): at 11:59

## 2018-08-27 RX ADMIN — POLYETHYLENE GLYCOL 3350 17 GRAM(S): 17 POWDER, FOR SOLUTION ORAL at 14:01

## 2018-08-27 RX ADMIN — Medication 10 MILLIGRAM(S): at 14:01

## 2018-08-27 RX ADMIN — Medication 25 MILLIGRAM(S): at 00:33

## 2018-08-27 RX ADMIN — Medication 25 MILLIGRAM(S): at 20:17

## 2018-08-27 NOTE — PROGRESS NOTE ADULT - PROBLEM SELECTOR PLAN 3
Reports no BM since 8/24. Prior to that had diarrhea/bloody as above. Benign abd exam.   - if no BM by this afternoon, give one time senna/docusate and reassess tomorrow Reports no BM since 8/24. Prior to that had diarrhea/bloody as above. Benign abd exam.   - give miralax/senna and reassess

## 2018-08-27 NOTE — PROGRESS NOTE ADULT - PROBLEM SELECTOR PLAN 4
Pruritic, raised rash over antecubital fossa o/n, resolved w/ 25mg IV benadryl. Only recent exposure was to po tylenol. Has received IV tylenol in the past w/o issue. Unclear if truly related to tylenol.  - pain well managed w/o tylenol  - hold tylenol Pruritic, raised rash over antecubital fossa o/n, resolved w/ 25mg IV benadryl. Only recent exposure was to po tylenol. Has received IV tylenol in the past w/o issue. Unclear if truly related to tylenol. Pt seen later in the day, now w/ apparent rash but unclear etiology. Only recent med was folic acid but pt has been on this chronically. Additionally, no known food allergies.  - benadryl 10 iv  - pain well managed w/o tylenol  - hold tylenol Pruritic, raised rash over antecubital fossa o/n, resolved w/ 25mg IV benadryl. Only recent exposure was to po tylenol. Has received IV tylenol in the past w/o issue. Unclear if truly related to tylenol. Pt seen later in the day, now w/ mild apparent rash  on R neck , antecubital, but unclear etiology. Only recent med was folic acid but pt has been on this chronically. Additionally, no known food allergies. However, he does report eating carrot cake not so long ago that was brought in to him from a friend. He denies that the rash is spreading. He denies sore throat/swelling/difficulty swallowing , or SOB .   - benadryl 10 iv PRN   - pain well managed w/o tylenol  - hold tylenol  -continue to monitor closely

## 2018-08-27 NOTE — PROGRESS NOTE ADULT - PROBLEM SELECTOR PLAN 2
- Months of history of crampy abdominal pain that moves around the abdomen in the setting of acute on chronic colitis  - Pain management w/ oral oxycodone 5 mg IR Q4H PRN, IV morphine 1 mg Q6H PRN  - Zofran PO PRN for nausea   - S/p 3 doses of golimumab, holding steroids for now   - GI recs appreciated: pending PCR panel Home UC meds: apriso 0.375 daily, Carasa suppository daily, folic acid daily, and simponi monthly (last dose last week on the 21st or 22nd). Will plan on following w/ Dr. Garces as outpt after discharge, however, would ultimately prefer to f/u w/ Dr. Maryellen Blevins.   - Months of history of crampy abdominal pain that moves around the abdomen in the setting of acute on chronic colitis  - Pain management w/ oral oxycodone 5 mg IR Q4H PRN, IV morphine 1 mg Q6H PRN  - Zofran PO PRN for nausea   - S/p 3 doses of golimumab, holding steroids for now   - GI recs appreciated: pending PCR panel  - will need to f/u w/ GI on when pt can restart UC meds.

## 2018-08-27 NOTE — PROGRESS NOTE ADULT - SUBJECTIVE AND OBJECTIVE BOX
Daily Progress Note  Author: Justin Alford MD PGY-1  Pgr: [phone]      Patient is a 37y old  Male who presents with a chief complaint of bloody diarrhea    SUBJECTIVE / OVERNIGHT EVENTS: Episode of itching and hives a few hours after receiving po tylenol o/n, no SOB, diarrhea, or AMS. s/p 25mg IV benadryl. This morning pt drowsy but w/o complaints, rash has disappeared. Now c/o constipation, has not had BM since last Friday. Diffuse abd pain when he passes gas.     REVIEW OF SYSTEMS:  CONSTITUTIONAL: No weakness, fevers or chills  EYES/ENT: No visual changes;  No vertigo or throat pain   NECK: No pain or stiffness  RESPIRATORY: No cough, wheezing, hemoptysis; No shortness of breath  CARDIOVASCULAR: No chest pain or palpitations  GASTROINTESTINAL: No abdominal pain, nausea, vomiting, or diarrhea. No melena or hematochezia. +constipation  GENITOURINARY: No dysuria, frequency or hematuria  NEUROLOGICAL: No numbness or weakness  SKIN: No itching, burning, rashes, or lesions   All other review of systems is negative unless indicated above.    MEDICATIONS  (STANDING):  folic acid 1 milliGRAM(s) Oral daily  sodium chloride 0.9%. 1000 milliLiter(s) (100 mL/Hr) IV Continuous <Continuous>    MEDICATIONS  (PRN):  aluminum hydroxide/magnesium hydroxide/simethicone Suspension 30 milliLiter(s) Oral every 6 hours PRN Dyspepsia  oxyCODONE    IR 5 milliGRAM(s) Oral every 4 hours PRN moderate to severe pain      T(C): 36.8 (08-27-18 @ 05:38), Max: 37.2 (08-26-18 @ 20:46)  HR: 68 (08-27-18 @ 05:38) (62 - 68)  BP: 98/66 (08-27-18 @ 05:38) (98/66 - 114/61)  RR: 17 (08-27-18 @ 05:38) (17 - 17)  SpO2: 97% (08-27-18 @ 05:38) (97% - 98%)    CAPILLARY BLOOD GLUCOSE        I&O's Summary      GENERAL: No acute distress, well-developed  HEAD:  Atraumatic, Normocephalic  ENT: EOMI, PERRLA, No JVD, moist mucosa  CHEST/LUNG: Clear to auscultation bilaterally  BACK: No spinal tenderness  HEART: Regular rate and rhythm; No murmurs, rubs, or gallops  ABDOMEN: Soft, +mild TTP diffusely, Nondistended; Bowel sounds present  EXTREMITIES:  No clubbing, cyanosis, or edema  PSYCH: Nl behavior, nl affect  NEUROLOGY: AAOx3, non-focal, cranial nerves intact  SKIN: Normal color, No rashes or lesions    LABS:                        13.6   10.97 )-----------( 323      ( 27 Aug 2018 04:38 )             41.5     08-27    135  |  104  |  9   ----------------------------<  98  3.6   |  26  |  0.71    Ca    8.4      27 Aug 2018 04:38  Phos  4.0     08-27  Mg     2.1     08-27    TPro  5.4<L>  /  Alb  2.8<L>  /  TBili  0.4  /  DBili  x   /  AST  13  /  ALT  19  /  AlkPhos  25<L>  08-26            RADIOLOGY & ADDITIONAL TESTS:  Imaging Personally Reviewed:  Consultant(s) Notes Reviewed:    Care Discussed with Consultants/Other Providers:

## 2018-08-27 NOTE — PROGRESS NOTE ADULT - ASSESSMENT
Impression:    1. 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 Symptoms appear to have resolved without any additional therapy. May represent break-through symptoms due to inadequate Golimumab dosing. Patient's symptoms may have represented resolving EPEC infection, however, this would not be associated with bloody diarrhea.       Recommendations:  - F/U GI-PCR  - Advance diet as tolerated  - Outpatient follow-up with Dr. Brannon Garces at [facility]  - Patient to follow-up at [facility] with IBD specialist  - Symptomatic treatment per primary team    Milo Gunter MD  Gastroenterology Fellow  Pager number: [phone] / [phone] Impression:    1. 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 Symptoms appear to have resolved without any additional therapy. May represent break-through symptoms due to inadequate Golimumab dosing. Patient's symptoms may have represented resolving EPEC infection, however, this would not be associated with bloody diarrhea.       Recommendations:  - F/U GI-PCR  - Advance diet as tolerated  - Outpatient follow-up with Dr. Brannon Garces at [facility] ([phone])  - Patient to follow-up at [facility] with IBD specialist  - Symptomatic treatment per primary team    Milo Gunter MD  Gastroenterology Fellow  Pager number: [phone] / [phone]

## 2018-08-27 NOTE — CHART NOTE - NSCHARTNOTEFT_GEN_A_CORE
Scottie Garcia MD  PGY1 - Internal Medicine  pgr: [id number]      Called to pt bedside for itchiness after taking 650mg acetaminophen.    Per pt he feels itchy over his arms and has a redness that looks and feels like his other allergy, Mesalamine. Denies SOB or itchiness or swelling in the mouth/throat.    Per nursing the only medication the pt has received in the last 6 hours was Tylenol 650mg at 20:47, now 00:15 at time of rash presentation.     Exam:  GEN: A/Ox3, in no apparent distress  SKIN: red raised urticarial rash over both arms/forearms in the antecubital fossa, ~3in x 6in on each arm    ASSESSMENT/PLAN:  Likely allergic reactions with hx of reaction to NSAIDs (Mesalamine). No symptoms of respiratory involvement at this time.  - Benadryl IV 25mg  - Reassess for reduction or cessation of spreading rash  - Monitor resp status  - Hold Tylenol for reassessment of possible new allergy

## 2018-08-27 NOTE — PROGRESS NOTE ADULT - SUBJECTIVE AND OBJECTIVE BOX
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    Interval Events:     - The patient endorses 1 non-bloody loose stool over the weekend, but has not had any additional bowel movements.  - C-diff PCR was negative upon admission. GI-PCR has yet to be collected.  - He denies fever/chills, N/V, and significant abdominal pain.    Allergies:  [facility] (Hives)    Hospital Medications:  aluminum hydroxide/magnesium hydroxide/simethicone Suspension 30 milliLiter(s) Oral every 6 hours PRN  folic acid 1 milliGRAM(s) Oral daily  oxyCODONE    IR 5 milliGRAM(s) Oral every 4 hours PRN  sodium chloride 0.9%. 1000 milliLiter(s) IV Continuous <Continuous>    PMHX/PSHX:  Polyp of colon, unspecified part of colon, unspecified type  Condyloma  Herpes simplex type 2 infection  Herpes labialis  Ulcerative colitis  History of tracheostomy as a child  S/P knee surgery    Family history:  Family history of hypertension  Family history of cerebrovascular accident (Grandparent)  Family history of diabetes mellitus (Grandparent, Uncle)    Denies family history of colon cancer/polyps, stomach cancer/polyps, pancreatic cancer/masses, liver cancer/disease, ovarian cancer and endometrial cancer.    ROS:     General:  No wt loss, fevers, chills, night sweats, fatigue  Eyes:  Good vision, no reported pain  ENT:  No sore throat, pain, runny nose, dysphagia  CV:  No pain, palpitations, hypo/hypertension  Pulm:  No dyspnea, cough, tachypnea, wheezing  GI:  No pain, No nausea, No vomiting, No diarrhea, No constipation, No weight loss, No fever, No pruritis, No rectal bleeding, No tarry stools, No dysphagia  :  No pain, bleeding, incontinence, nocturia  Muscle:  No pain, weakness  Neuro:  No weakness, tingling, memory problems  Psych:  No fatigue, insomnia, mood problems, depression  Endocrine:  No polyuria, polydipsia, cold/heat intolerance  Heme:  No petechiae, ecchymosis, easy bruisability  Skin:  No rash, tattoos, scars, edema    PHYSICAL EXAM:   Vital Signs:  Vital Signs Last 24 Hrs  T(C): 36.8 (27 Aug 2018 05:38), Max: 37.2 (26 Aug 2018 20:46)  T(F): 98.3 (27 Aug 2018 05:38), Max: 99 (26 Aug 2018 20:46)  HR: 68 (27 Aug 2018 05:38) (62 - 68)  BP: 98/66 (27 Aug 2018 05:38) (98/66 - 114/61)  BP(mean): --  RR: 17 (27 Aug 2018 05:38) (17 - 17)  SpO2: 97% (27 Aug 2018 05:38) (97% - 98%)    GENERAL:  No acute distress  HEENT:  Normocephalic/atraumatic,  no scleral icterus  CHEST:  Clear to auscultation bilaterally, no wheezes/rales/ronchi, no accessory muscle use  HEART:  Regular rate and rhythm, no murmurs/rubs/gallops  ABDOMEN:  Soft, non-tender, non-distended, normoactive bowel sounds, no masses, no hepato-splenomegaly, no signs of chronic liver disease  EXTREMITIES:  No cyanosis, clubbing, or edema  SKIN:  No rash/erythema/ecchymoses/petechiae/wounds/abscess/warm/dry  NEURO:  Alert and oriented x 3, no asterixis, no tremor    LABS:                        13.6   10.97 )-----------( 323      ( 27 Aug 2018 04:38 )             41.5     Mean Cell Volume: 84.7 fL (08-27-18 @ 04:38)    08-27    135  |  104  |  9   ----------------------------<  98  3.6   |  26  |  0.71    Ca    8.4      27 Aug 2018 04:38  Phos  4.0     08-27  Mg     2.1     08-27    TPro  5.4<L>  /  Alb  2.8<L>  /  TBili  0.4  /  DBili  x   /  AST  13  /  ALT  19  /  AlkPhos  25<L>  08-26    LIVER FUNCTIONS - ( 26 Aug 2018 06:19 )  Alb: 2.8 g/dL / Pro: 5.4 g/dL / ALK PHOS: 25 u/L / ALT: 19 u/L / AST: 13 u/L / GGT: x                      13.6   10.97 )-----------( 323      ( 27 Aug 2018 04:38 )             41.5                         13.3   10.49 )-----------( 299      ( 26 Aug 2018 06:19 )             40.3                         11.9   11.75 )-----------( 307      ( 25 Aug 2018 05:55 )             36.4                         13.4   11.09 )-----------( 302      ( 24 Aug 2018 14:00 )             41.2     Imaging:    No new imaging

## 2018-08-27 NOTE — PROGRESS NOTE ADULT - PROBLEM SELECTOR PLAN 1
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 Infectious colitis vs exacerbation of existing chronic colitis   - Monitor stool count: reports having no BM since Friday, was not bloody diarrhea at that point   - C. diff neg, ESR and CRP not overtly remarkable to suggest flare. GI PCR pending   - Clear liquid diet for now. 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 Infectious colitis vs exacerbation of existing chronic colitis   - Monitor stool count: reports having no BM since Friday, was not bloody diarrhea at that point   - C. diff neg, ESR and CRP not overtly remarkable to suggest flare. GI PCR pending   - Advance diet as tolerated, will try solids today

## 2018-08-28 VITALS
TEMPERATURE: 99 F | RESPIRATION RATE: 18 BRPM | HEART RATE: 80 BPM | DIASTOLIC BLOOD PRESSURE: 63 MMHG | OXYGEN SATURATION: 95 % | SYSTOLIC BLOOD PRESSURE: 100 MMHG

## 2018-08-28 LAB
BUN SERPL-MCNC: 15 MG/DL — SIGNIFICANT CHANGE UP (ref 7–23)
CALCIUM SERPL-MCNC: 8.1 MG/DL — LOW (ref 8.4–10.5)
CHLORIDE SERPL-SCNC: 101 MMOL/L — SIGNIFICANT CHANGE UP (ref 98–107)
CO2 SERPL-SCNC: 25 MMOL/L — SIGNIFICANT CHANGE UP (ref 22–31)
CREAT SERPL-MCNC: 0.83 MG/DL — SIGNIFICANT CHANGE UP (ref 0.5–1.3)
GLUCOSE SERPL-MCNC: 102 MG/DL — HIGH (ref 70–99)
HCT VFR BLD CALC: 38.8 % — LOW (ref 39–50)
HGB BLD-MCNC: 12.9 G/DL — LOW (ref 13–17)
MAGNESIUM SERPL-MCNC: 2.1 MG/DL — SIGNIFICANT CHANGE UP (ref 1.6–2.6)
MCHC RBC-ENTMCNC: 27.9 PG — SIGNIFICANT CHANGE UP (ref 27–34)
MCHC RBC-ENTMCNC: 33.2 % — SIGNIFICANT CHANGE UP (ref 32–36)
MCV RBC AUTO: 84 FL — SIGNIFICANT CHANGE UP (ref 80–100)
NRBC # FLD: 0 — SIGNIFICANT CHANGE UP
PHOSPHATE SERPL-MCNC: 4 MG/DL — SIGNIFICANT CHANGE UP (ref 2.5–4.5)
PLATELET # BLD AUTO: 312 K/UL — SIGNIFICANT CHANGE UP (ref 150–400)
PMV BLD: 9.2 FL — SIGNIFICANT CHANGE UP (ref 7–13)
POTASSIUM SERPL-MCNC: 3.8 MMOL/L — SIGNIFICANT CHANGE UP (ref 3.5–5.3)
POTASSIUM SERPL-SCNC: 3.8 MMOL/L — SIGNIFICANT CHANGE UP (ref 3.5–5.3)
RBC # BLD: 4.62 M/UL — SIGNIFICANT CHANGE UP (ref 4.2–5.8)
RBC # FLD: 13.7 % — SIGNIFICANT CHANGE UP (ref 10.3–14.5)
SODIUM SERPL-SCNC: 137 MMOL/L — SIGNIFICANT CHANGE UP (ref 135–145)
WBC # BLD: 11.14 K/UL — HIGH (ref 3.8–10.5)
WBC # FLD AUTO: 11.14 K/UL — HIGH (ref 3.8–10.5)

## 2018-08-28 PROCEDURE — 99239 HOSP IP/OBS DSCHRG MGMT >30: CPT

## 2018-08-28 PROCEDURE — 99232 SBSQ HOSP IP/OBS MODERATE 35: CPT | Mod: GC

## 2018-08-28 RX ORDER — MESALAMINE 400 MG
1 TABLET, DELAYED RELEASE (ENTERIC COATED) ORAL
Qty: 0 | Refills: 0 | COMMUNITY

## 2018-08-28 RX ORDER — SENNA PLUS 8.6 MG/1
1 TABLET ORAL
Qty: 0 | Refills: 0 | COMMUNITY
Start: 2018-08-28

## 2018-08-28 RX ORDER — POLYETHYLENE GLYCOL 3350 17 G/17G
17 POWDER, FOR SOLUTION ORAL
Qty: 510 | Refills: 0 | OUTPATIENT
Start: 2018-08-28 | End: 2018-09-26

## 2018-08-28 RX ORDER — SENNA PLUS 8.6 MG/1
2 TABLET ORAL
Qty: 60 | Refills: 0 | OUTPATIENT
Start: 2018-08-28 | End: 2018-09-26

## 2018-08-28 RX ORDER — FAMOTIDINE 10 MG/ML
1 INJECTION INTRAVENOUS
Qty: 30 | Refills: 0 | OUTPATIENT
Start: 2018-08-28 | End: 2018-09-26

## 2018-08-28 RX ORDER — DIPHENHYDRAMINE HCL 50 MG
1 CAPSULE ORAL
Qty: 60 | Refills: 0 | OUTPATIENT
Start: 2018-08-28 | End: 2018-09-26

## 2018-08-28 RX ORDER — DIPHENHYDRAMINE HCL 50 MG
1 CAPSULE ORAL
Qty: 30 | Refills: 0 | OUTPATIENT
Start: 2018-08-28 | End: 2018-09-26

## 2018-08-28 RX ORDER — DIPHENHYDRAMINE HCL 50 MG
25 CAPSULE ORAL ONCE
Qty: 0 | Refills: 0 | Status: COMPLETED | OUTPATIENT
Start: 2018-08-28 | End: 2018-08-28

## 2018-08-28 RX ORDER — FAMOTIDINE 10 MG/ML
20 INJECTION INTRAVENOUS ONCE
Qty: 0 | Refills: 0 | Status: COMPLETED | OUTPATIENT
Start: 2018-08-28 | End: 2018-08-28

## 2018-08-28 RX ORDER — DIPHENHYDRAMINE HCL 50 MG
25 CAPSULE ORAL EVERY 4 HOURS
Qty: 0 | Refills: 0 | Status: DISCONTINUED | OUTPATIENT
Start: 2018-08-28 | End: 2018-08-28

## 2018-08-28 RX ADMIN — Medication 25 MILLIGRAM(S): at 21:25

## 2018-08-28 RX ADMIN — Medication 25 MILLIGRAM(S): at 15:03

## 2018-08-28 RX ADMIN — FAMOTIDINE 20 MILLIGRAM(S): 10 INJECTION INTRAVENOUS at 09:48

## 2018-08-28 RX ADMIN — Medication 25 MILLIGRAM(S): at 06:31

## 2018-08-28 RX ADMIN — SENNA PLUS 1 TABLET(S): 8.6 TABLET ORAL at 15:12

## 2018-08-28 RX ADMIN — Medication 25 MILLIGRAM(S): at 01:55

## 2018-08-28 RX ADMIN — POLYETHYLENE GLYCOL 3350 17 GRAM(S): 17 POWDER, FOR SOLUTION ORAL at 12:35

## 2018-08-28 RX ADMIN — FAMOTIDINE 20 MILLIGRAM(S): 10 INJECTION INTRAVENOUS at 16:50

## 2018-08-28 RX ADMIN — Medication 1 MILLIGRAM(S): at 12:35

## 2018-08-28 NOTE — PROGRESS NOTE ADULT - PROBLEM SELECTOR PLAN 3
- Home UC meds: Apriso 0.375 daily, Carasa suppository daily, folic acid daily, and Simponi monthly (last dose last week on the 21st or 22nd).  - Will plan on following w/ Dr. Garces as outpt after discharge, however, would ultimately prefer to f/u w/ Dr. Maryellen Blevins.  - Months of history of crampy abdominal pain that moves around the abdomen in the setting of acute on chronic colitis  - Pain management w/ oral oxycodone 5 mg IR Q4H PRN, IV morphine 1 mg Q6H PRN  - Zofran PO PRN for nausea   - S/p 3 doses of golimumab, holding steroids for now   - GI recs appreciated: pending PCR panel  - will need to f/u w/ GI on when pt can restart UC meds.

## 2018-08-28 NOTE — PROGRESS NOTE ADULT - PROBLEM SELECTOR PLAN 4
Reports no BM since 8/24. Prior to that had diarrhea/bloody as above. Benign abd exam.   - give miralax/senna and reassess - Reports no BM since 8/24. Prior to that had diarrhea/bloody as above. Benign abd exam.   - Currently on low fiber diet  - Will send GI PCR and ova study once patient passes BM

## 2018-08-28 NOTE — PROGRESS NOTE ADULT - SUBJECTIVE AND OBJECTIVE BOX
Patient is a 37y old  Male who presents with a chief complaint of abdominal pain, blood in stool. diarrhea (25 Aug 2018 18:27)      SUBJECTIVE / OVERNIGHT EVENTS:  Patient was seen and examined. No BM overnight. Itchy urticaria started from yesterday over the anterior BUE, neck, face, R trunk, and perineum. PO Benadryl does not work well, however IV Benadryl provided comfort. Patient does not have lip swelling, hoarseness, mucous desquamation, or wheezing. Denies fever, chills, headache, dizziness, or SOB.    MEDICATIONS  (STANDING):  famotidine Injectable 20 milliGRAM(s) IV Push once  folic acid 1 milliGRAM(s) Oral daily  melatonin 3 milliGRAM(s) Oral at bedtime  polyethylene glycol 3350 17 Gram(s) Oral daily  sodium chloride 0.9%. 1000 milliLiter(s) (100 mL/Hr) IV Continuous <Continuous>    MEDICATIONS  (PRN):  aluminum hydroxide/magnesium hydroxide/simethicone Suspension 30 milliLiter(s) Oral every 6 hours PRN Dyspepsia  diphenhydrAMINE   Capsule 25 milliGRAM(s) Oral every 4 hours PRN Rash and/or Itching  oxyCODONE    IR 5 milliGRAM(s) Oral every 4 hours PRN moderate to severe pain  senna 1 Tablet(s) Oral daily PRN Constipation        CAPILLARY BLOOD GLUCOSE        I&O's Summary    Vital Signs Last 24 Hrs  T(C): 36.4 (28 Aug 2018 06:25), Max: 37.6 (27 Aug 2018 21:35)  T(F): 97.6 (28 Aug 2018 06:25), Max: 99.7 (27 Aug 2018 21:35)  HR: 76 (27 Aug 2018 21:35) (72 - 76)  BP: 99/61 (28 Aug 2018 06:25) (99/61 - 106/60)  BP(mean): --  RR: 18 (28 Aug 2018 06:25) (18 - 18)  SpO2: 97% (28 Aug 2018 06:25) (97% - 99%)    PHYSICAL EXAM  GENERAL: well-developed middle aged male. NAD   HEAD:  Atraumatic, Normocephalic  EYES: EOMI, PERRLA, conjunctiva and sclera clear  NECK: Supple, No JVD  CHEST/LUNG: Clear to auscultation bilaterally; No wheezes, rales or rhonchi   HEART: Regular rate and rhythm; No murmurs  ABDOMEN: Bowel sounds present. Diffuse abdominal soreness with some pain in the epigastric region on palpation.     EXTREMITIES:  2+ Peripheral Pulses, No clubbing, cyanosis, or edema  PSYCH: AAOx3  SKIN: No rashes or lesions    LABS:                        12.9   11.14 )-----------( 312      ( 28 Aug 2018 05:47 )             38.8     08-28    137  |  101  |  15  ----------------------------<  102<H>  3.8   |  25  |  0.83    Ca    8.1<L>      28 Aug 2018 05:47  Phos  4.0     08-28  Mg     2.1     08-28                RADIOLOGY & ADDITIONAL TESTS:    Imaging Personally Reviewed:    Consultant(s) Notes Reviewed:      Care Discussed with Consultants/Other Providers: Patient is a 37y old  Male who presents with a chief complaint of abdominal pain, blood in stool. diarrhea (25 Aug 2018 18:27)      SUBJECTIVE / OVERNIGHT EVENTS:  Patient was seen and examined. No BM overnight. Itchy urticaria started from yesterday over the anterior BUE, neck, face, R trunk, and perineum. PO Benadryl does not work well, however IV Benadryl provided comfort. Patient does not have lip swelling, hoarseness, mucous desquamation, or wheezing. Denies fever, chills, headache, dizziness, or SOB.    MEDICATIONS  (STANDING):  famotidine Injectable 20 milliGRAM(s) IV Push once  folic acid 1 milliGRAM(s) Oral daily  melatonin 3 milliGRAM(s) Oral at bedtime  polyethylene glycol 3350 17 Gram(s) Oral daily  sodium chloride 0.9%. 1000 milliLiter(s) (100 mL/Hr) IV Continuous <Continuous>    MEDICATIONS  (PRN):  aluminum hydroxide/magnesium hydroxide/simethicone Suspension 30 milliLiter(s) Oral every 6 hours PRN Dyspepsia  diphenhydrAMINE   Capsule 25 milliGRAM(s) Oral every 4 hours PRN Rash and/or Itching  oxyCODONE    IR 5 milliGRAM(s) Oral every 4 hours PRN moderate to severe pain  senna 1 Tablet(s) Oral daily PRN Constipation        CAPILLARY BLOOD GLUCOSE        I&O's Summary    Vital Signs Last 24 Hrs  T(C): 36.4 (28 Aug 2018 06:25), Max: 37.6 (27 Aug 2018 21:35)  T(F): 97.6 (28 Aug 2018 06:25), Max: 99.7 (27 Aug 2018 21:35)  HR: 76 (27 Aug 2018 21:35) (72 - 76)  BP: 99/61 (28 Aug 2018 06:25) (99/61 - 106/60)  BP(mean): --  RR: 18 (28 Aug 2018 06:25) (18 - 18)  SpO2: 97% (28 Aug 2018 06:25) (97% - 99%)    PHYSICAL EXAM  GENERAL: well-developed middle aged male. NAD   HEAD:  Atraumatic, Normocephalic  EYES: EOMI, PERRLA, conjunctiva and sclera clear  NECK: Supple, No JVD  CHEST/LUNG: Clear to auscultation bilaterally; No wheezes, rales or rhonchi   HEART: Regular rate and rhythm; No murmurs  ABDOMEN: Bowel sounds present. Diffuse abdominal soreness with some pain in the epigastric region on palpation.     EXTREMITIES:  2+ Peripheral Pulses, No clubbing, cyanosis, or edema  PSYCH: AAOx3  SKIN: Patch distribution of urticaria with confluence over face, neck, anterior BUE and over popliteal area bilaterally, more pronounced over flexural surface. Confluent wheals over R trunk and perineum.    LABS:                        12.9   11.14 )-----------( 312      ( 28 Aug 2018 05:47 )             38.8     08-28    137  |  101  |  15  ----------------------------<  102<H>  3.8   |  25  |  0.83    Ca    8.1<L>      28 Aug 2018 05:47  Phos  4.0     08-28  Mg     2.1     08-28                RADIOLOGY & ADDITIONAL TESTS:    Imaging Personally Reviewed:    Consultant(s) Notes Reviewed:      Care Discussed with Consultants/Other Providers:

## 2018-08-28 NOTE — PROGRESS NOTE ADULT - SUBJECTIVE AND OBJECTIVE BOX
Chief Complaint:  Patient is a 37y old  Male who presents with a chief complaint of abdominal pain, blood in stool. diarrhea (25 Aug 2018 18:27)      Interval Events:     Allergies:  Carrot cake: itching with rash throughout body (Rash)  [facility] (Hives)        [facility] Medications:  aluminum hydroxide/magnesium hydroxide/simethicone Suspension 30 milliLiter(s) Oral every 6 hours PRN  diphenhydrAMINE   Capsule 25 milliGRAM(s) Oral every 4 hours PRN  folic acid 1 milliGRAM(s) Oral daily  melatonin 3 milliGRAM(s) Oral at bedtime  oxyCODONE    IR 5 milliGRAM(s) Oral every 4 hours PRN  polyethylene glycol 3350 17 Gram(s) Oral daily  senna 1 Tablet(s) Oral daily PRN  sodium chloride 0.9%. 1000 milliLiter(s) IV Continuous <Continuous>      PMHX/PSHX:  Polyp of colon, unspecified part of colon, unspecified type  Condyloma  Herpes simplex type 2 infection  Herpes labialis  Ulcerative colitis  History of tracheostomy as a child  S/P knee surgery      Family history:  Family history of hypertension  Family history of cerebrovascular accident (Grandparent)  Family history of diabetes mellitus (Grandparent, Uncle)  No pertinent family history in first degree relatives  Family history of hypertension      ROS:     General:  No wt loss, fevers, chills, night sweats, fatigue,   Eyes:  Good vision, no reported pain  ENT:  No sore throat, pain, runny nose, dysphagia  CV:  No pain, palpitations, hypo/hypertension  Pulm:  No dyspnea, cough, tachypnea, wheezing  GI:  No pain, No nausea, No vomiting, No diarrhea, No constipation, No weight loss, No fever, No pruritis, No rectal bleeding, No tarry stools, No dysphagia  :  No pain, bleeding, incontinence, nocturia  Muscle:  No pain, weakness  Neuro:  No weakness, tingling, memory problems  Psych:  No fatigue, insomnia, mood problems, depression  Endocrine:  No polyuria, polydipsia, cold/heat intolerance  Heme:  No petechiae, ecchymosis, easy bruisability  Skin:  No rash, tattoos, scars, edema      PHYSICAL EXAM:   Vital Signs:  Vital Signs Last 24 Hrs  T(C): 36.4 (28 Aug 2018 06:25), Max: 37.6 (27 Aug 2018 21:35)  T(F): 97.6 (28 Aug 2018 06:25), Max: 99.7 (27 Aug 2018 21:35)  HR: 76 (27 Aug 2018 21:35) (72 - 76)  BP: 99/61 (28 Aug 2018 06:25) (99/61 - 106/60)  BP(mean): --  RR: 18 (28 Aug 2018 06:25) (18 - 18)  SpO2: 97% (28 Aug 2018 06:25) (97% - 99%)  Daily     Daily     GENERAL:  No acute distress  HEENT:  Normocephalic/atraumtic,  no scleral icterus  CHEST:  Clear to auscultation bilaterally, no wheezes/rales/ronchi, no accessory muscle use  HEART:  Regular rate and rhythm, no murmurs/rubs/gallops  ABDOMEN:  Soft, non-tender, non-distended, normoactive bowel sounds, no masses, no hepato-splenomegaly, no signs of chronic liver disease  EXTREMITIES:  No cyanosis, clubbing, or edema  SKIN:  No rash/erythema/ecchymoses/petechiae/wounds/abscess/warm/dry  NEURO:  Alert and oriented x 3, no asterixis, no tremor    LABS:                        12.9   11.14 )-----------( 312      ( 28 Aug 2018 05:47 )             38.8     Mean Cell Volume: 84.0 fL (08-28-18 @ 05:47)    08-28    137  |  101  |  15  ----------------------------<  102<H>  3.8   |  25  |  0.83    Ca    8.1<L>      28 Aug 2018 05:47  Phos  4.0     08-28  Mg     2.1     08-28               12.9   11.14 )-----------( 312      ( 28 Aug 2018 05:47 )             38.8                         13.6   10.97 )-----------( 323      ( 27 Aug 2018 04:38 )             41.5                         13.3   10.49 )-----------( 299      ( 26 Aug 2018 06:19 )             40.3       Imaging: Chief Complaint: Bloody diarrhea    Interval Events:   - Mr. Mackenzie has not had any additional bowel movements over the past 24 hours  - Overnight the patient developed a diffuse red and itchy rash after eating carrot cake - he was given IV benadryl and famotidine. He denies worsening rash and shortness of breath.    Allergies:  Carrot cake: itching with rash throughout body (Rash)  [facility] (Hives)    [facility] Medications:  aluminum hydroxide/magnesium hydroxide/simethicone Suspension 30 milliLiter(s) Oral every 6 hours PRN  diphenhydrAMINE   Capsule 25 milliGRAM(s) Oral every 4 hours PRN  folic acid 1 milliGRAM(s) Oral daily  melatonin 3 milliGRAM(s) Oral at bedtime  oxyCODONE    IR 5 milliGRAM(s) Oral every 4 hours PRN  polyethylene glycol 3350 17 Gram(s) Oral daily  senna 1 Tablet(s) Oral daily PRN  sodium chloride 0.9%. 1000 milliLiter(s) IV Continuous <Continuous>    PMHX/PSHX:  Polyp of colon, unspecified part of colon, unspecified type  Condyloma  Herpes simplex type 2 infection  Herpes labialis  Ulcerative colitis  History of tracheostomy as a child  S/P knee surgery    Family history:  Family history of hypertension  Family history of cerebrovascular accident (Grandparent)  Family history of diabetes mellitus (Grandparent, Uncle)  Family history of hypertension    Denies family history of colon cancer/polyps, stomach cancer/polyps, pancreatic cancer/masses, liver cancer/disease, ovarian cancer and endometrial cancer.    ROS:     General:  No wt loss, fevers, chills, night sweats, fatigue,   Eyes:  Good vision, no reported pain  ENT:  No sore throat, pain, runny nose, dysphagia  CV:  No pain, palpitations, hypo/hypertension  Pulm:  No dyspnea, cough, tachypnea, wheezing  GI:  No pain, No nausea, No vomiting, No diarrhea, No constipation, No weight loss, No fever, No pruritis, No rectal bleeding, No tarry stools, No dysphagia  :  No pain, bleeding, incontinence, nocturia  Muscle:  No pain, weakness  Neuro:  No weakness, tingling, memory problems  Psych:  No fatigue, insomnia, mood problems, depression  Endocrine:  No polyuria, polydipsia, cold/heat intolerance  Heme:  No petechiae, ecchymosis, easy bruisability  Skin:  No rash, tattoos, scars, edema    PHYSICAL EXAM:   Vital Signs:  Vital Signs Last 24 Hrs  T(C): 36.4 (28 Aug 2018 06:25), Max: 37.6 (27 Aug 2018 21:35)  T(F): 97.6 (28 Aug 2018 06:25), Max: 99.7 (27 Aug 2018 21:35)  HR: 76 (27 Aug 2018 21:35) (72 - 76)  BP: 99/61 (28 Aug 2018 06:25) (99/61 - 106/60)  BP(mean): --  RR: 18 (28 Aug 2018 06:25) (18 - 18)  SpO2: 97% (28 Aug 2018 06:25) (97% - 99%)    GENERAL:  No acute distress  HEENT:  Normocephalic / atraumatic,  no scleral icterus  CHEST:  Clear to auscultation bilaterally, no wheezes/rales/ronchi, no accessory muscle use  HEART:  Regular rate and rhythm, no murmurs/rubs/gallops  ABDOMEN:  Soft, non-tender, non-distended, normoactive bowel sounds, no masses, no hepato-splenomegaly, no signs of chronic liver disease  EXTREMITIES:  No cyanosis, clubbing, or edema  SKIN:  No rash/erythema/ecchymoses/petechiae/wounds/abscess/warm/dry  NEURO:  Alert and oriented x 3, no asterixis, no tremor    LABS:                        12.9   11.14 )-----------( 312      ( 28 Aug 2018 05:47 )             38.8     Mean Cell Volume: 84.0 fL (08-28-18 @ 05:47)    08-28    137  |  101  |  15  ----------------------------<  102<H>  3.8   |  25  |  0.83    Ca    8.1<L>      28 Aug 2018 05:47  Phos  4.0     08-28  Mg     2.1     08-28               12.9   11.14 )-----------( 312      ( 28 Aug 2018 05:47 )             38.8                         13.6   10.97 )-----------( 323      ( 27 Aug 2018 04:38 )             41.5                         13.3   10.49 )-----------( 299      ( 26 Aug 2018 06:19 )             40.3     Imaging:    No new imaging

## 2018-08-28 NOTE — PROGRESS NOTE ADULT - PROVIDER SPECIALTY LIST ADULT
Gastroenterology
Gastroenterology
Internal Medicine

## 2018-08-28 NOTE — CHART NOTE - NSCHARTNOTEFT_GEN_A_CORE
Around 6:20 AM, patient complained of itchy rash over his anterior RUE and bilateral popliteal area. IV Benadryl 25 mg was given, which immediately provided relief. IV Pepcid 20 mg was also given for add further histamine blockade. Around 6:20 AM, patient complained of itchy rash over his anterior RUE and bilateral popliteal area. No wheezing, increased WOB, lip swelling, voice change, mucous desquamation or skin desquamation noted. IV Benadryl 25 mg was given, which immediately provided relief. IV Pepcid 20 mg was also given for add further histamine blockade.

## 2018-08-28 NOTE — PROGRESS NOTE ADULT - PROBLEM SELECTOR PLAN 1
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 Infectious colitis vs exacerbation of existing chronic colitis   - Monitor stool count: reports having no BM since Friday, was not bloody diarrhea at that point   - C. diff neg, ESR and CRP not overtly remarkable to suggest flare. 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 Infectious colitis vs exacerbation of existing chronic colitis   - Monitor stool count: reports having no BM since Friday, was not bloody diarrhea at that point   - C. diff neg. ESR and CRP not overtly remarkable to suggest flare.  - Stool Cx neg.  - Advance diet as tolerated, will try solids today  - D/c pending stable bowel movement w/o blood and to f/u outpt GI

## 2018-08-28 NOTE — PROGRESS NOTE ADULT - PROBLEM SELECTOR PLAN 2
- 8/27: Pruritic, raised rash over antecubital fossa overnight, resolved w/ 25mg IV Benadryl. Only recent exposure was to po Tylenol. Has received IV Tylenol in the past w/o issue. Unclear if truly related to Tylenol. Pt seen later in the day, now w/ mild apparent rash on R neck , antecubital, but unclear etiology. Only recent med was folic acid but pt has been on this chronically. Additionally, no known food allergies. However, he does report eating carrot cake not so long ago that was brought in to him from a friend. He denies that the rash is spreading. He denies sore throat/swelling/difficulty swallowing, or SOB.  - C/w diphenhydramine 25 mg PO Q4H PRN and IV for rash. However patient reports insufficient response with PO. Responds well to IV 25 mg  - 8/28: Currently with wheals over his anterior BUE, neck, face, perineum, and R trunk. No wheezing or SOB. No voice change, lip swelling, mucosal or skin desquamation. IV diphenhydramine 25 mg and IV famotidine 20 mg given.  - Continue to monitor closely

## 2018-08-28 NOTE — PROGRESS NOTE ADULT - ASSESSMENT
Impression:    1. 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 Symptoms appear to have resolved without any additional therapy. May represent break-through symptoms due to inadequate Golimumab dosing. Patient's symptoms may have represented resolving EPEC infection, however, this would not be associated with bloody diarrhea.       Recommendations:  - Re-start patient's home mesalamine  - Outpatient follow-up with Dr. Brannon Garces at [facility] ([phone])  - Patient to follow-up at [facility] with IBD specialist  - Symptomatic treatment per primary team    Milo Gunter MD  Gastroenterology Fellow  Pager number: [phone] / [phone] Impression:    1. 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 Symptoms appear to have resolved without any additional therapy. May represent break-through symptoms due to inadequate Golimumab dosing. Patient's symptoms may have represented resolving EPEC infection, however, this would not be associated with bloody diarrhea.       Recommendations:  - Re-start patient's home mesalamine  - Outpatient follow-up with Dr. Brannon Garces at [phone]  - Patient to follow-up at [facility] with IBD specialist  - Symptomatic treatment per primary team    Milo Gunter MD  Gastroenterology Fellow  Pager number: [phone] / [phone]

## 2018-08-28 NOTE — PROGRESS NOTE ADULT - PROBLEM SELECTOR PLAN 5
DVT ppx: Ambulate. SCDs  IMPROVE: 0
DVT ppx: Ambulate. SCDs  IMPROVE: 0    Dispo: C- dif negative. ESR/CRP not suggest of flare with PCR pending. Reported no BM since Friday with minimal stool output. Non bloody. Lipase not suggestive of pancreatitis.    Justin Alford MD PGY-1  [phone]

## 2018-08-28 NOTE — PROGRESS NOTE ADULT - ATTENDING COMMENTS
I have seen and evaluated the patient with the GI Fellow and GI Team.  I agree with the findings, formulation and plan of care as documented in the fellow's note, except as noted.
Pt seen and examined. Minimal stool since admission. Still c/o abdominal cramping. Exam unchanged as above. F/u pending GI PCR to r/o infection. C. diff neg and stool cx prelim neg. Try to limit opiods for pain control. F/u GI recs
Pt seen and examined. Case d/w RN and housestaff. Reports some abdominal cramping and small loose stool this afternoon improved from multiple episodes of diarrhea prior to hospitalization. F/u stool cxs. C diff neg. Pain control. Advance diet as tolerated. F/u GI recs.
Patient seen and examined. Reports no further abdominal pain, bloody BM or diarrhea , however no BM yet. Patient is tolerating diet.   Will f/u w/ GI recs.   Would avoid further opioids as pt. w/ improved pain.     Rash noted on exam. Unclear etiology. Currently not on any suspicious medications. Pt does report having outside food yesterday. Pt reports allergy in the past and was seen by an allergist years ago. No alarming signs or compromised airway. C/w benadryl PRN. Can also use pepcid in addition if needed. No indication for steroids at this time. Pt advised to f/u w/ his allergist as outpatient.     D/c planning anticipated for today. Discharge document with further details. Spent 36 min in discharge planning and coordination.
Patient seen and examined. Reports no further bloody BM or diarrhea and abd pain improved.    Will advance diet and see how he tolerates  GI recs appreciated  D/c planning

## 2018-09-06 ENCOUNTER — APPOINTMENT (OUTPATIENT)
Dept: GASTROENTEROLOGY | Facility: CLINIC | Age: 38
End: 2018-09-06
Payer: MEDICAID

## 2018-09-06 VITALS
TEMPERATURE: 98.5 F | DIASTOLIC BLOOD PRESSURE: 70 MMHG | RESPIRATION RATE: 16 BRPM | WEIGHT: 167.6 LBS | OXYGEN SATURATION: 98 % | SYSTOLIC BLOOD PRESSURE: 110 MMHG | HEART RATE: 79 BPM | HEIGHT: 71 IN | BODY MASS INDEX: 23.46 KG/M2

## 2018-09-06 DIAGNOSIS — K92.1 MELENA: ICD-10-CM

## 2018-09-06 DIAGNOSIS — K51.90 ULCERATIVE COLITIS, UNSPECIFIED, W/OUT COMPLICATIONS: ICD-10-CM

## 2018-09-06 PROCEDURE — 99244 OFF/OP CNSLTJ NEW/EST MOD 40: CPT

## 2018-09-06 RX ORDER — BACILLUS COAGULANS/INULIN 1B-250 MG
CAPSULE ORAL
Refills: 0 | Status: ACTIVE | COMMUNITY

## 2018-09-06 RX ORDER — FOLIC ACID 1 MG/1
1 TABLET ORAL
Refills: 0 | Status: ACTIVE | COMMUNITY

## 2018-09-06 RX ORDER — GOLIMUMAB 100 MG/ML
100 INJECTION, SOLUTION SUBCUTANEOUS
Refills: 0 | Status: ACTIVE | COMMUNITY

## 2018-09-06 RX ORDER — MESALAMINE 1000 MG/1
1000 SUPPOSITORY RECTAL
Refills: 0 | Status: ACTIVE | COMMUNITY

## 2018-09-06 RX ORDER — ACETAMINOPHEN 160 MG/5ML
500 SUSPENSION ORAL
Refills: 0 | Status: ACTIVE | COMMUNITY

## 2018-09-06 RX ORDER — MESALAMINE 375 MG/1
CAPSULE, EXTENDED RELEASE ORAL
Refills: 0 | Status: ACTIVE | COMMUNITY

## 2018-09-06 RX ORDER — CHROMIUM 200 MCG
TABLET ORAL
Refills: 0 | Status: ACTIVE | COMMUNITY

## 2018-10-04 LAB — CALPROTECTIN FECAL: 111 UG/G

## 2018-11-08 ENCOUNTER — APPOINTMENT (OUTPATIENT)
Dept: GASTROENTEROLOGY | Facility: CLINIC | Age: 38
End: 2018-11-08

## 2020-02-20 NOTE — DISCHARGE NOTE ADULT - NSCORESITESY/N_GEN_A_CORE_RD
"TIME RECORD    Date:  03/14/2017    Start Time:  3:00  Stop Time:   4:10  PROCEDURES:    TIMED  Procedure Min.   MT 15   TE 45                 UNTIMED  Procedure Min.   CP 10         Total Timed Minutes: 60  Total Timed Units:  4  Total Untimed Units:  0  Charges Billed/# of units: 4 (1 MT, 3 TE)      Progress/Current Status    Subjective:     Patient ID: Tracy Armendariz is a 65 y.o. female.  Diagnosis:   1. Difficulty walking     2. Decreased range of motion of right knee     3. Right knee pain, unspecified chronicity     4. Decreased functional mobility       Pain: 2 /10; 0/10 following interventions.  Pt  No anterior knee pain today. Pain location posteromedial right knee    Objective:     Session initiated stationary bike with full revolutions supervised for 10' total.  Patient received  MT x 15' with retrograde edema massage to R knee and STM/MFR to R quad and HS including medial insertion in supine, SL and prone positions. .  There ex  per log x 40 minutes.   Session ended with CP x 10' in supine with LE elevated on wedge.    Date  3/14/17 3/8/17 3/6/17 3/3/17 2/27/17 2./24/17 2/22/17 2/20/2017 2/17/2017 2/16/17 2/14/2017 2/13/2017 2/8/17   VISIT 14 13 12 11 FOTO10 9 8 7 6 5 4 3 2   POC 4/7/17 4/7/17 4/7/17 4/7/17 4/7/17 4/7/17 4/7/17 4/7/17 4/7/17 4/7/17 4/7/17 4/7/2017 4/7/2017   Gcode (KOOS) 8/10 7/10 6/10 5/10 4/10 3/10 2/10 1/10 6/10 5/10 4/10 3/10 2/10   Visit amount   total  93.82  1295.27 61.84 125.82 125.8  1013.79 93.82  887.99 60.41  794.17 93.82  733.76 125.8  639.94 61.84  514.14 145.00  457.30 142.88  312.30 93.82  169.42   MT 15' 12' 10' See note by  FS, PT 15' 15' 10' 10' 10' 10' 15' 20 20    supine HSS with strap 2x30"  -- - -- - -- MT 3 x 30'' R 3 x 30'' R 3 x 30'' R 3 x 30'' R 3 x 30'' R   Gastroc Str. 3x30" EOS  --   - -- NT W/ strap W/ strap W/ strap W/ strap --   Bike 10' 10' 10' See note by FS PT 8' 10' 10' 10' bacward full revs 10' with 10 bacward full revs 10' partial revs 10' " Call one of the offices below to establish a primary care provider.  If you are unable to get an appointment and feel it is an emergency and need to be seen immediately please return to the Emergency Department.    Call one of the office below to set up a primary care provider.    Dr. Medardo Domingo                                                                                                       [address] [facility] [address]  [phone]    Dr. Logan, Dr. SHIVA Gallagher, Dr. DAVID Gallagher ([facility])  [address] [facility] [address]  [phone]    Dr. Puckett, Dr. Wylie, Dr. Mueller ([facility])  [address] [facility] [address]  [phone]    Dr. Landeros  [address] [facility] [address]  [phone]    Dr. Lim, Dr. Fountain, Dr. Arora, Dr. Thibodeaux ([facility])  [address] [place] [address] [address]  [facility] [address]  [phone]    Dr. Helen Freeman  [address] [facility] [place] [address]  [phone]    Dr. Mona Adler  [address]   [address]  [facility] [address]  [phone]    Dr. Domingo  [address] [facility] [address]  [phone]    Dr. Morales, Dr. Fournier  [address] [facility] [place] [address]  [phone]    Dr. Rod  [address]                                                              [address]  [facility] [address]  [phone]    Dr. Vargas  [address] [facility] [id number] [phone]    Dr. Dea Reid  [address]  [address]  [facility] [address]  [phone]                  "partial revs 10' partial revs Hold     QS 10"x 10 10"x10 R 10"x10 B 10"x10 B 10"x10 B 10"x10 B 10'' x 10 B 10'' x 10 B 4'' on estim 10'' holds R 4'' on estim 10'' holds R 4'' on estim 10'' holds R 4'' on estim 10'' holds R 5'' x 15   SAQ 2x15 B 2# -- 2 x 10 B 1# skip next time -- -- 2x12 B 2 x 10 B 2 x 10 B 3'' Estim R 3'' Estim R 3'' Estim R 3'' Estim R  2 x 10 B   SLR 2x15 B 2# -- 2 x 10 B 1#  Skip next time -- -- 2x12 B 2 x 10 B 2 x 10 B 3'' Estim R  Mod A 3'' Estim R  Mod A 3'' Estim R  Mod A 3'' Estim R 2 x 10 R   SL Abd 2x15 B 2#  -- -- -- 2x12 B 2 x 10 B 2 x 10 B 2 x 10 R 2 x 10 R 2 x 10 R 2 x 10   2 x 10    Heel Slides -- 10"x10 R 10"x10 R 10"x10 R 10"x10 R 10"x10 R 10'' x 10 R 10'' x 10 R 10'' x 10 R 10'' x 10 r 10 x 10 r 10 x 10 r hold   Sisi Hip Add   -- NT 10"x10  seated 10"x10  seated 10'' x10 seated 10'' x10 seated 5'' x10 5'' x10 5'' x10 5'' x10 next   LAQs 2# 2 x 15 1# 2x15 R 1# 2x15 R 1# 2x10 R 2x15 R 2x12 R 2x10 R 2x10 R 2x10 R 2x10 B OOT OOT 2 x 10 B   Prone: HSC 2# 2 x 10                  Hip Ext                Seated Hip Flex   --  --  2 x 30'' B 2 x 30'' B OOT 2x10 B OOT OOT 2 x 30'' B   Cybex   Leg Press oot 3.5 3x10 DL    2.5 2x10 R OOT 3.5  2x10 DL    2.5  2x10 R 3.5  DL  2x10 B  2.5 R 2x10 3.0  DL  2x10 B 3.0 DL  2 x 10 B OOT/next next next -- -- --   B Heel raises 2 x 10 no support               TKE   RTB 2x12 R RTB 2x12 R RTB 2 x 10  -- -- -- -- -- -- --   Hip Abd 2# 2x15 B 1# 2x12 B 1# 2x12 B 1# 2x12 B 2x10 B 1# 2x10 B 2 x 10 B 2 x 10 B 2 x 10 R 2 x 10 B -- -- --   Hip Flex 2# 2x15 B 1# 2x12 B 1# 2x12 B 1# 2x12 B 2x10 B 1# 2x10 B 2 x 10 B 2 x 10 B 2 x 10  R Next  -- -- --   Hip Ext 2# 2x15 B 1# 2x12 B 1# 2x12 B 1# 2x12 B  2x10 B 1# 2x10 B 2 x 10 B 2 x 10 B 2 x 10 R next -- -- --   HS Curls See prone 3.0 2x10 OOT next Next next -- -- -- -- -- -- -- --   Knee Ext  -- -- -- -- -- -- -- -- -- -- -- --   Step Ups 2 x 10 B alt op  Blue step NT 2x12 R OOT for L 2x12 B 2 x 10 B -- -- -- -- -- -- " -- --   Step Downs Fwd sm step  X 10 B UE support next next -- -- -- -- -- -- -- -- -- -   Gait  -- -- -- -- -- See note See note See note  see note -- -- --   CP 10' 10' 10' 10' 10' 10' 10' 10' 10' 10' 10' 10' 10'   Initials MB 1/6 CS FS CS  1/6 FS CS 1/6 FS FS FS FS KV FS FS        Assessment:     Patient performed all therex without difficulty.  Increased resistance per log pain free excepting stretch discomfort. Required B UE support for pain free step down fwd with non surgical leg. Good pain relief following interventions.  Patient Education/Response:     CONT HEP    Plans and Goals:     CONT POC and progress ROM  Short Term Goals (4 Weeks):   1. Independent with initial HEP for L/E strengthening and knee ROM.  2. Increase R knee AROM to 0-105.   3. Increase R knee PROM to 0-110.   4. Increase R hip and knee strength to be 75-80% of L LE.  5. Decrease edema compared to L knee.    Long Term Goals (8 Weeks):   1. Independent with updated HEP.  2. Increase R knee AROM to 0-120.  3. Increase R L/E strength to 90% of L LE.  4. Able to ambulate community distances without use of assistive device without pain or gait deficits.  5. Pt will report 61% on the FOTO lower extremity assessment placing the patient in the 60-80% impaired, limited, or restricted category indicating increased functional LE mobility.  6. Pt will report 20/68 on the KOOS functional mobility assessment placing the patient in the 20-40% impaired, limited, or restricted category indicating increased functional LE mobility. [facility] [address] Goal mobility CJ                  No

## 2021-05-29 NOTE — H&P ADULT. - VASCULAR
Patient said she is using this for prevention purposes. Will forward to PCP for approval request.     LUIS/SAILAJA    detailed exam

## 2021-06-27 ENCOUNTER — EMERGENCY (EMERGENCY)
Facility: HOSPITAL | Age: 41
LOS: 1 days | Discharge: HOME | End: 2021-06-27
Attending: EMERGENCY MEDICINE | Admitting: EMERGENCY MEDICINE
Payer: MEDICAID

## 2021-06-27 VITALS
OXYGEN SATURATION: 98 % | SYSTOLIC BLOOD PRESSURE: 98 MMHG | HEIGHT: 71 IN | TEMPERATURE: 98 F | DIASTOLIC BLOOD PRESSURE: 54 MMHG | RESPIRATION RATE: 15 BRPM | HEART RATE: 79 BPM

## 2021-06-27 DIAGNOSIS — Z87.09 PERSONAL HISTORY OF OTHER DISEASES OF THE RESPIRATORY SYSTEM: Chronic | ICD-10-CM

## 2021-06-27 PROCEDURE — 93010 ELECTROCARDIOGRAM REPORT: CPT

## 2021-06-27 PROCEDURE — 99284 EMERGENCY DEPT VISIT MOD MDM: CPT | Mod: 25

## 2021-06-27 RX ORDER — SODIUM CHLORIDE 9 MG/ML
1000 INJECTION, SOLUTION INTRAVENOUS ONCE
Refills: 0 | Status: COMPLETED | OUTPATIENT
Start: 2021-06-27 | End: 2021-06-27

## 2021-06-27 NOTE — ED ADULT TRIAGE NOTE - CHIEF COMPLAINT QUOTE
pt presents to ED for evaluation of generalized body aches, subjective fevers, chills, and weakness x 1 week that is progressively getting worse. pt axox4 in nad steady gait. denies any cp sob dizziness loss of taste/smell, sick contacts.

## 2021-06-28 VITALS
HEART RATE: 70 BPM | OXYGEN SATURATION: 98 % | RESPIRATION RATE: 16 BRPM | DIASTOLIC BLOOD PRESSURE: 57 MMHG | SYSTOLIC BLOOD PRESSURE: 112 MMHG

## 2021-06-28 LAB
ALBUMIN SERPL ELPH-MCNC: 3.8 G/DL — SIGNIFICANT CHANGE UP (ref 3.3–5)
ALP SERPL-CCNC: 67 U/L — SIGNIFICANT CHANGE UP (ref 40–120)
ALT FLD-CCNC: 169 U/L — HIGH (ref 4–41)
ANION GAP SERPL CALC-SCNC: 16 MMOL/L — HIGH (ref 7–14)
APPEARANCE UR: CLEAR — SIGNIFICANT CHANGE UP
AST SERPL-CCNC: 76 U/L — HIGH (ref 4–40)
B PERT DNA SPEC QL NAA+PROBE: SIGNIFICANT CHANGE UP
BASOPHILS NFR BLD AUTO: 1.8 % — SIGNIFICANT CHANGE UP (ref 0–2)
BILIRUB SERPL-MCNC: 0.4 MG/DL — SIGNIFICANT CHANGE UP (ref 0.2–1.2)
BILIRUB UR-MCNC: NEGATIVE — SIGNIFICANT CHANGE UP
BLOOD GAS VENOUS COMPREHENSIVE RESULT: SIGNIFICANT CHANGE UP
BUN SERPL-MCNC: 8 MG/DL — SIGNIFICANT CHANGE UP (ref 7–23)
C PNEUM DNA SPEC QL NAA+PROBE: SIGNIFICANT CHANGE UP
CALCIUM SERPL-MCNC: 8.7 MG/DL — SIGNIFICANT CHANGE UP (ref 8.4–10.5)
CHLORIDE SERPL-SCNC: 94 MMOL/L — LOW (ref 98–107)
CO2 SERPL-SCNC: 21 MMOL/L — LOW (ref 22–31)
COLOR SPEC: YELLOW — SIGNIFICANT CHANGE UP
CREAT SERPL-MCNC: 0.83 MG/DL — SIGNIFICANT CHANGE UP (ref 0.5–1.3)
DIFF PNL FLD: NEGATIVE — SIGNIFICANT CHANGE UP
EOSINOPHIL NFR BLD AUTO: 1.8 % — SIGNIFICANT CHANGE UP (ref 0–6)
FLUAV SUBTYP SPEC NAA+PROBE: SIGNIFICANT CHANGE UP
FLUBV RNA SPEC QL NAA+PROBE: SIGNIFICANT CHANGE UP
GLUCOSE SERPL-MCNC: 111 MG/DL — HIGH (ref 70–99)
GLUCOSE UR QL: NEGATIVE — SIGNIFICANT CHANGE UP
HADV DNA SPEC QL NAA+PROBE: SIGNIFICANT CHANGE UP
HCOV 229E RNA SPEC QL NAA+PROBE: SIGNIFICANT CHANGE UP
HCOV HKU1 RNA SPEC QL NAA+PROBE: SIGNIFICANT CHANGE UP
HCOV NL63 RNA SPEC QL NAA+PROBE: SIGNIFICANT CHANGE UP
HCOV OC43 RNA SPEC QL NAA+PROBE: SIGNIFICANT CHANGE UP
HCT VFR BLD CALC: 40.3 % — SIGNIFICANT CHANGE UP (ref 39–50)
HGB BLD-MCNC: 13.4 G/DL — SIGNIFICANT CHANGE UP (ref 13–17)
HIV 1+2 AB+HIV1 P24 AG SERPL QL IA: SIGNIFICANT CHANGE UP
HMPV RNA SPEC QL NAA+PROBE: SIGNIFICANT CHANGE UP
HPIV1 RNA SPEC QL NAA+PROBE: SIGNIFICANT CHANGE UP
HPIV2 RNA SPEC QL NAA+PROBE: SIGNIFICANT CHANGE UP
HPIV3 RNA SPEC QL NAA+PROBE: SIGNIFICANT CHANGE UP
HPIV4 RNA SPEC QL NAA+PROBE: SIGNIFICANT CHANGE UP
IANC: 1.9 K/UL — SIGNIFICANT CHANGE UP (ref 1.5–8.5)
KETONES UR-MCNC: NEGATIVE — SIGNIFICANT CHANGE UP
LEUKOCYTE ESTERASE UR-ACNC: NEGATIVE — SIGNIFICANT CHANGE UP
LYMPHOCYTES # BLD AUTO: 37.2 % — SIGNIFICANT CHANGE UP (ref 13–44)
MCHC RBC-ENTMCNC: 28 PG — SIGNIFICANT CHANGE UP (ref 27–34)
MCHC RBC-ENTMCNC: 33.3 GM/DL — SIGNIFICANT CHANGE UP (ref 32–36)
MCV RBC AUTO: 84.1 FL — SIGNIFICANT CHANGE UP (ref 80–100)
MONOCYTES NFR BLD AUTO: 5.3 % — SIGNIFICANT CHANGE UP (ref 2–14)
NEUTROPHILS NFR BLD AUTO: 30.1 % — LOW (ref 43–77)
NITRITE UR-MCNC: NEGATIVE — SIGNIFICANT CHANGE UP
PH UR: 6.5 — SIGNIFICANT CHANGE UP (ref 5–8)
PLATELET # BLD AUTO: 81 K/UL — LOW (ref 150–400)
POTASSIUM SERPL-MCNC: 3.8 MMOL/L — SIGNIFICANT CHANGE UP (ref 3.5–5.3)
POTASSIUM SERPL-SCNC: 3.8 MMOL/L — SIGNIFICANT CHANGE UP (ref 3.5–5.3)
PROT SERPL-MCNC: 6.7 G/DL — SIGNIFICANT CHANGE UP (ref 6–8.3)
PROT UR-MCNC: ABNORMAL
RAPID RVP RESULT: SIGNIFICANT CHANGE UP
RBC # BLD: 4.79 M/UL — SIGNIFICANT CHANGE UP (ref 4.2–5.8)
RBC # FLD: 13.2 % — SIGNIFICANT CHANGE UP (ref 10.3–14.5)
RSV RNA SPEC QL NAA+PROBE: SIGNIFICANT CHANGE UP
RV+EV RNA SPEC QL NAA+PROBE: SIGNIFICANT CHANGE UP
SARS-COV-2 RNA SPEC QL NAA+PROBE: SIGNIFICANT CHANGE UP
SODIUM SERPL-SCNC: 131 MMOL/L — LOW (ref 135–145)
SP GR SPEC: 1.01 — SIGNIFICANT CHANGE UP (ref 1.01–1.02)
TSH SERPL-MCNC: 1.15 UIU/ML — SIGNIFICANT CHANGE UP (ref 0.27–4.2)
UROBILINOGEN FLD QL: SIGNIFICANT CHANGE UP
WBC # BLD: 5.61 K/UL — SIGNIFICANT CHANGE UP (ref 3.8–10.5)
WBC # FLD AUTO: 5.61 K/UL — SIGNIFICANT CHANGE UP (ref 3.8–10.5)

## 2021-06-28 PROCEDURE — 71045 X-RAY EXAM CHEST 1 VIEW: CPT | Mod: 26

## 2021-06-28 PROCEDURE — 76700 US EXAM ABDOM COMPLETE: CPT | Mod: 26

## 2021-06-28 RX ORDER — FAMOTIDINE 10 MG/ML
20 INJECTION INTRAVENOUS ONCE
Refills: 0 | Status: COMPLETED | OUTPATIENT
Start: 2021-06-28 | End: 2021-06-28

## 2021-06-28 RX ORDER — ACETAMINOPHEN 500 MG
975 TABLET ORAL ONCE
Refills: 0 | Status: COMPLETED | OUTPATIENT
Start: 2021-06-28 | End: 2021-06-28

## 2021-06-28 RX ORDER — METOCLOPRAMIDE HCL 10 MG
10 TABLET ORAL ONCE
Refills: 0 | Status: COMPLETED | OUTPATIENT
Start: 2021-06-28 | End: 2021-06-28

## 2021-06-28 RX ADMIN — Medication 10 MILLIGRAM(S): at 00:53

## 2021-06-28 RX ADMIN — SODIUM CHLORIDE 1000 MILLILITER(S): 9 INJECTION, SOLUTION INTRAVENOUS at 00:16

## 2021-06-28 RX ADMIN — FAMOTIDINE 20 MILLIGRAM(S): 10 INJECTION INTRAVENOUS at 00:58

## 2021-06-28 RX ADMIN — Medication 975 MILLIGRAM(S): at 00:54

## 2021-06-28 NOTE — ED PROVIDER NOTE - NS ED ROS FT
GENERAL: + fever, chills, night sweats  EYES: no change in vision  HEENT: no trouble swallowing, no trouble speaking  CARDIAC: +chest pain  PULMONARY: +SOB; no cough  GI: +abdominal pain, nausea; no vomiting, no diarrhea, no constipation  : + dysuria, no frequency, no change in appearance, or odor of urine  SKIN: no rashes  NEURO: + headache, weakness  MSK: No joint pain

## 2021-06-28 NOTE — ED ADULT NURSE NOTE - PRIMARY CARE PROVIDER
Daily Note     Today's date: 2018  Patient name: Joanna Gonzalez  : 1952  MRN: [id number]  Referring provider: Deb Beard MD  Dx:   Encounter Diagnosis     ICD-10-CM    1  Chronic back pain, unspecified back location, unspecified back pain laterality M54 9     G89 29                   Subjective: Ned Trujillo reports being sore after his most recent workouts       Objective: See treatment diary below  Precautions: HTN (controlled)     Daily Treatment Diary     Manual  10/16 10/29 10/31 11/5 11/7        Lumbar PA's AF AF AF AF AF        QL release AF AF AF AF AF                                                   Exercise Diary                                                                                                                                                                                                                                                                                      Modalities              Laser to bilateral lumbar paraspinal  AF AF AF AF            Assessment: Tolerated treatment well  Patient contnues to obtain relief of pain post sessions with improved lumbar mobility  Progress as able and plan to transition to HEP       Plan: Continue per plan of care 
unk

## 2021-06-28 NOTE — ED PROVIDER NOTE - PATIENT PORTAL LINK FT
You can access the FollowMyHealth Patient Portal offered by [facility] by registering at the following website: [email or web address]. By joining [facility]’s FollowMyHealth portal, you will also be able to view your health information using other applications (apps) compatible with our system.

## 2021-06-28 NOTE — ED PROVIDER NOTE - ATTENDING CONTRIBUTION TO CARE
Afebrile. Awake and Alert. Lungs CTA. Heart RRR. Abdomen soft NTND. CN II-XII grossly intact. Moves all extremities without lateralization.    URI Sx and generalized weakness  r/o PNA  r/o COVID  Chest pain  r/o ACS: EKG non-ischemic, no personal ACS risk factors

## 2021-06-28 NOTE — ED PROVIDER NOTE - CLINICAL SUMMARY MEDICAL DECISION MAKING FREE TEXT BOX
39y/o M w/ h/o inflammatory bowel disease, former cocaine and ketamine user p/w multiple medical complaints, normal vitals. No track marks on exam or heart murmur no concern for IE. Most likely flu like illness, will check tsh, cardiac marker/EKG/CXR, RVP, electrolytes and reassess after IVF.

## 2021-06-28 NOTE — ED PROVIDER NOTE - PROGRESS NOTE DETAILS
Improved. +epigastric TTP with mild transaminitis, Will obtain RUQ US. BRADY. Olman: Received pt on sign out pending imaging. CXR clear. RUQ US shows gallbladder sludge no secondary signs of cholecystitis. Abd soft non tender on reassessment. Labs unremarkable other than transaminitis. Advised pt to follow up with PCP regarding lab values and further evaluation. Pt verbalizes agreement and understanding. If any worsening symptoms return to ED.

## 2021-06-28 NOTE — ED ADULT NURSE NOTE - OBJECTIVE STATEMENT
Pt is a 40 year old male reporting to the ED for headache, chest pain and abdominal  pain for 7 days. PMh of colitis. Pt reports decreased po intake, decreased sleep. Reports pain is 7/10. Pt is AOX4. Pt denies SOB. PT respirations even an unlabored. Pt denies fever, chills, n/v/d. Pt denies  dysuria, hematuria. 18 g iv placed right ac labs drawn, pending review, will continue to monitor.

## 2021-06-28 NOTE — ED PROVIDER NOTE - NSFOLLOWUPINSTRUCTIONS_ED_ALL_ED_FT
Follow up with your primary care physician in 48-72 hours- bring copies of your results.  Return to the ER for worsening or persistent symptoms, including but not limited to worsening/persistent pain, shortness of breath, fevers, vomiting, lightheadedness, passing out and/or ANY NEW OR CONCERNING SYMPTOMS. If you have issues obtaining follow up, please call: [phone] to obtain a doctor or specialist who takes your insurance in your area.  You may call [phone] to make an appointment with the internal medicine clinic.

## 2021-06-28 NOTE — ED PROVIDER NOTE - OBJECTIVE STATEMENT
39y/o M w/ h/o inflammatory bowel disease, former cocaine and ketamine user p/w subjective fevers, chills, night sweats which have been worsening all within the last 1 wk. Now has been having fatigue/weakness, tingling in L arm, chest pain, exertional SOB, generalized abdominal pain. Also was treated with STI last week. Was not vaccinated against COVID. No rash, IVDU.

## 2021-06-28 NOTE — ED PROVIDER NOTE - PHYSICAL EXAMINATION
Gen: NAD, non-toxic appearing  Head: normal appearing  HEENT: normal conjunctiva, oral mucosa moist  Lung: no respiratory distress, speaking in full sentences, clear to ascultation bilaterally     CV: regular rate and rhythm   Abd: soft, non distended, non tender   MSK: no visible deformities  NEURO: pupils 4 mm, PERRL, EOMI (CN III, IV, VI), facial sensation intact to light touch in all 3 divisions bilat (CN V), face is symmetric with normal eye closure, eye opening, and smile (CN VII), hearing is normal to rubbing fingers (CN VII), palate elevates symmetrically, phonation is normal (CN IX, X),  shoulder shrug intact bilat (CN XI), tongue is midline with nl movements and no atrophy (CN XII), finger to nose test nl bilat, negative pronator drift bilat, negative Romberg, speech is clear; 5/5 motor strength BUE and BLE: deltoids, biceps, triceps, wrist flexors/extensors, hand , hip flexors, knee flexors/extensors, plantar/dorsiflexors, hallux flexors/extensors; sensation intact to light touch BUE and BLE: C5-T1 and L3-S1   Skin: Warm  Psych: normal affect

## 2021-06-29 LAB
CULTURE RESULTS: NO GROWTH — SIGNIFICANT CHANGE UP
SPECIMEN SOURCE: SIGNIFICANT CHANGE UP

## 2021-07-03 LAB
CULTURE RESULTS: SIGNIFICANT CHANGE UP
CULTURE RESULTS: SIGNIFICANT CHANGE UP
SPECIMEN SOURCE: SIGNIFICANT CHANGE UP
SPECIMEN SOURCE: SIGNIFICANT CHANGE UP

## 2021-10-11 NOTE — ED PROVIDER NOTE - NSTIMEPROVIDERCAREINITIATE_GEN_ER
October 11, 2021     Patient: Phillip Lopez   YOB: 2009   Date of Visit: 10/11/2021       To Whom it May Concern:    Phillip Lopez was seen in my clinic on 10/11/2021 at 1:40 pm. Please excuse Phillip for his absence from school on the date listed above to be able to make his appointment.     Sincerely,           Satish Balderrama, DO    Medical information is confidential and cannot be disclosed without the written consent of the patient or his representative.       24-Aug-2018 11:25

## 2022-07-19 NOTE — ED ADULT TRIAGE NOTE - PAIN RATING/NUMBER SCALE (0-10): REST
OBSERVATION SHORT STAY DISCHARGE SUMMARY NOTE    ADMISSION DATE:  7/18/2022  DISCHARGE DATE:  7/19/2022  DISCHARGING PHYSICIAN:  Aaron Dallas PA-C  ATTENDING PHYSICIAN:  Oleg Ellis MD    DISCHARGE DIAGNOSIS: blood tinged sputum      DISCHARGE DISPOSITION:  home    CONDITION AT DISCHARGE:  stable    DISCHARGE INSTRUCTIONS:  DISCHARGE MEDICATIONS:  See Discharge Medication Reconciliation List  FOLLOW-UP:  PCP in 5-7 days  SPECIAL INSTRUCTIONS:      CONSULTS: none      PROCEDURES: none      Discharge instructions, medications and followup appointment were discussed with the patient and/or family and After Visit Summary was printed and given at d/c.     Patient is above her baseline functionally and decisional. She has no medical needs at the hospital. She's not being recommended for intermittent 1-3 times weekly therapy.  MD did d/w daughter and discussed reasons that may qualify a patient for an ongoing hospital stay.  This was explained as daughter was refusing to pick her mom up. Patient's PCP also reached out to this writer and acknowledged her discussion with patient's daughter and why she's being cleared for d/c.     Addendum: did d/w LCSW who filed a report with [employer] 2/2 to perceived neglect and abandonment on daughter's part with regards to her mom's care.     Patient case and care discussed in collaboration with Dr. Ellis.    Aaron Dallas, MS, SAKINA            9

## 2022-11-01 NOTE — CHART NOTE - NSCHARTNOTESELECT_GEN_ALL_CORE
Event Note [de-identified] : Physical exam demonstrates the patient to be alert and oriented x 3 and capable of ambulation. The patient is well-developed and well-nourished in no apparent respiratory distress. The majority of the skin is intact bilaterally in the upper extremities without any bilateral elbow lymphadenopathy.\par \par Full, symmetric index through small finger ROM.  There is a 1-mm palpable dorsoulnar soft tissue mass that is palpable over the extensor tendon sheath.  Difficult to tell whether it is tender to palpation.  No skin changes, erythema, drainage or signs of infection.\par \par There is good capillary refill of the digits bilaterally. There are no masses palpated or sensitivity over the median and ulnar nerves at the level of the wrist. There is a negative Tinel's and negative Phalen's and a negative carpal tunnel compression test bilaterally. Sensation is intact to light touch bilaterally.\par  [de-identified] : Bilateral hand x-rays from [facility], 2022 were reviewed.  No appreciation of radiopaque foreign body or lesion within the dorsal soft tissues of the left index finger.\par \par An ultrasound over the dorsal aspect of the left index finger was obtained today to assess the mass.  There is a 2 x 1 mm hypoechoic, well-circumscribed mass superficial to the extensor tendon.

## 2022-11-11 NOTE — ED PROVIDER NOTE - TEMPLATE
OTOLARYNGOLOGY CLINIC NOTE  Date:  11/11/2022     Chief complaint:  Chief Complaint   Patient presents with    Other     Burning sensation on tip of tongue       History of Present Illness  Percy Ramirez is a 79 y.o. male  presenting today for a followup. He was last seen by me in October of 2021 as a follow up for vertigo and ETD. He presents today with no problem of burning tongue sensation and rhinorrhea.     Uses saline several times per day   Took benadryl which helped stop the runny nose some. Claritin did not seem to help   Has constant bilateral runny nose   Has not noticed if worse with eating . Later in visit said has noticed draining with eating . It Stops during the day sometimes but happens am in evening    Tip of tongue feels like burnt it on a hot cup of coffee    I originally saw the patient on 7-12-21 . Below text is copied from initial note on that date describing history of present illness at time of presentation.   Seen by dr richardson in 2018.  Has vertigo and takes medication only as needed. Does not like taking it because it makes him sleepy. Only was taking meclizine if really needs it - once a week. Valium worked better for him with controlling dizziness  Has gotten worse  Never did physical therapy for inner ear. He had a vng in the past that showed a 31% left sided weakness.   Thinks that right ear fullness is maybe worse with dizziness but not sure  Has bizzing sound in ear all the time, not worse with dizzy   When gets dizzy feels like a boat roakcin. Often feels like he is going to fall backwards.if looks up everything feels like it is going to fall backwards and gets dizzy- this is a little different sensation - feels like going to fall backwards if he looks up. Once puts head back into neutral position feels ok.      Sometimes vertigo lasts for a couple of hours. If gets up too fast and walks too fast after standing it will bring it on. Sometimes when in a car that is stopped and looks  "outside the other car looks like it is moving if his car is stopped. If looks at something real quick it looks like the whole thing is moving.    Past Medical History  Past Medical History:   Diagnosis Date    Allergy     Arthritis     CAD, multiple vessel     DR Melissa    Cataract     Depression     History of colon polyps     Hyperlipidemia     Hypertension     Macular degeneration     Dr Razo for injection, Jennifer for eye    S/P CABG x 2     Type 2 diabetes mellitus with circulatory disorder     Dr. Aquino        Past Surgical History  Past Surgical History:   Procedure Laterality Date    broken elbow      left    COLONOSCOPY N/A 10/4/2017    Procedure: COLONOSCOPY;  Surgeon: Gabriel Leung MD;  Location: [facility];  Service: Endoscopy;  Laterality: N/A;    EYE SURGERY      cataract    heart bypass      2004    HERNIA REPAIR      right    ROTATOR CUFF REPAIR      right  2004        Medications  Current Outpatient Medications on File Prior to Visit   Medication Sig Dispense Refill    acetaminophen (TYLENOL) 325 MG tablet Take 2 tablets (650 mg total) by mouth every 6 (six) hours as needed. 13 tablet 0    albuterol (PROVENTIL/VENTOLIN HFA) 90 mcg/actuation inhaler INHALE 2 PUFFS BY MOUTH EVERY 6 HOURS AS NEEDED FOR WHEEZING OR  SHORTNESS  OF  BREATH 54 g 0    atorvastatin (LIPITOR) 40 MG tablet Take 40 mg by mouth once daily.      BD INSULIN PEN NEEDLE UF SHORT 31 gauge x 5/16" Ndle       BD INSULIN SYRINGE ULTRA-FINE 1/2 mL 31 gauge x 15/64" Syrg       blood sugar diagnostic Strp To check BG 3 times daily, to use with insurance preferred meter 200 strip 11    carvedilol (COREG) 25 MG tablet Take 1 tablet (25 mg total) by mouth 2 (two) times daily. 180 tablet 0    ceramides 1,3,6-II (CERAVE DAILY MOISTURIZING) Lotn Apply twice daily to skin 355 mL 1    cinnamon bark 500 mg capsule Take 1,000 mg by mouth once daily.        clopidogreL (PLAVIX) 75 mg tablet Take 75 mg by mouth.      diclofenac sodium " "(VOLTAREN) 1 % Gel Apply 2 g topically 2 (two) times daily. 100 g 0    diclofenac sodium (VOLTAREN) 1 % Gel Apply 2 g topically 3 (three) times daily. 50 g 0    donepeziL (ARICEPT) 10 MG tablet Take 1 tablet (10 mg total) by mouth every evening. Take one half tablet for one week then as prescribed. 30 tablet 11    ENTRESTO  mg per tablet Take 1 tablet by mouth 2 (two) times daily.      fluticasone propionate (FLONASE) 50 mcg/actuation nasal spray 1 spray (50 mcg total) by Each Nostril route 2 (two) times daily. 18.2 mL 3    furosemide (LASIX) 40 MG tablet Take 40 mg by mouth once daily.      gabapentin (NEURONTIN) 300 MG capsule Take 4 capsules (1,200 mg total) by mouth 2 (two) times daily. (Patient taking differently: Take 900 mg by mouth 2 (two) times daily.) 540 capsule 1    glimepiride (AMARYL) 4 MG tablet Take 4 mg by mouth daily with breakfast.       HYDROcodone-acetaminophen (NORCO) 5-325 mg per tablet Take 1 tablet by mouth every 4 (four) hours as needed for Pain. Causes drowsiness. Do not drive or operate machinery with this medication. Do not drink alcohol with this medication. Causes constipation. Take with stool softener. 10 tablet 0    hydrOXYzine HCL (ATARAX) 25 MG tablet Take 1 tablet (25 mg total) by mouth 3 (three) times daily as needed for Itching or Anxiety. 90 tablet 3    insulin NPH-insulin regular, 70/30, (NOVOLIN 70/30) 100 unit/mL (70-30) injection Inject into the skin. Inject 10 units at breakfast and inject 10 units at night      insulin syringe-needle U-100 0.3 mL 31 gauge x 15/64" Syrg USE TO INJECT INSULIN THREE TIMES DAILY      insulin syringe-needle U-100 1/2 mL 31 x 5/16" Syrg       isosorbide mononitrate (IMDUR) 30 MG 24 hr tablet Take 30 mg by mouth once daily.      lancets Misc To check BG 3 times daily, to use with insurance preferred meter 200 each 11    LIDOcaine (LIDODERM) 5 % Place 1 patch onto the skin once daily. Remove & Discard patch within 12 hours or as directed by " MD 5 patch 1    metformin (GLUCOPHAGE) 500 MG tablet Take 1,000 mg by mouth 2 (two) times daily with meals. 2 Tablets in the morning, 2 Tablets in the evening      metoclopramide HCl (REGLAN) 5 MG tablet Take 5 mg by mouth 3 (three) times daily as needed.      nitroGLYCERIN (NITROSTAT) 0.4 MG SL tablet DISSOLVE 1 TABLET UNDER THE TONGUE EVERY 5 MINUTES AS  NEEDED FOR CHEST PAIN. MAX  OF 3 TABLETS IN 15 MINUTES. CALL 911 IF PAIN PERSISTS.      pravastatin (PRAVACHOL) 20 MG tablet Take 1 tablet (20 mg total) by mouth once daily. 90 tablet 1    spironolactone (ALDACTONE) 25 MG tablet Take 25 mg by mouth.      triamcinolone acetonide 0.1% (KENALOG) 0.1 % cream SMARTSI Application Topical 2-3 Times Daily      VIT C/VIT E AC/LUT/COPPER/ZINC (PRESERVISION LUTEIN ORAL) Take by mouth.      warfarin (COUMADIN) 5 MG tablet Take 2 tabs by mouth on Tuesday,Thursday, Saturday and Sundays then 1.5 on all other days.      blood-glucose meter kit To check BG 3 times daily, to use with insurance preferred meter 1 each 0    diazePAM (VALIUM) 2 MG tablet Take 1 tablet (2 mg total) by mouth every 12 (twelve) hours as needed for Anxiety (Dizziness). 20 tablet 0    DULoxetine (CYMBALTA) 60 MG capsule Take 1 capsule (60 mg total) by mouth once daily. 90 capsule 3     No current facility-administered medications on file prior to visit.       Review of Systems  Review of Systems   Constitutional: Negative.    Eyes:  Positive for photophobia.   Gastrointestinal: Negative.    Genitourinary: Negative.    Skin: Negative.    Endo/Heme/Allergies:  Bruises/bleeds easily.   Psychiatric/Behavioral:  The patient is nervous/anxious.     Answers submitted by the patient for this visit:  Review of Symptoms Questionnaire  (Submitted on 2022)  mouth sores: Yes  trouble swallowing: Yes  Sleep Apnea?: Yes  Irregular heartbeat?: Yes  Cold all of the time? : Yes  Light-headedness: Yes  Social History   reports that he quit smoking about 18 years ago.  "His smoking use included cigarettes. He has a 135.00 pack-year smoking history. He has quit using smokeless tobacco. He reports current alcohol use. He reports that he does not use drugs.     Family History  Family History   Problem Relation Age of Onset    Arthritis Mother     Diabetes Mother     Stroke Mother     Heart attack Father     Cancer Brother     Throat cancer Brother     Diabetes Maternal Aunt     Diabetes Maternal Uncle     Heart disease Maternal Uncle     Diabetes Paternal Aunt     Heart disease Paternal Aunt     Heart disease Paternal Uncle     Heart disease Maternal Grandmother     Cancer Maternal Grandfather         Physical Exam   There were no vitals filed for this visit. Body mass index is 28.13 kg/m².  Weight: 83.9 kg (185 lb)   Height: 5' 8" (172.7 cm)     GENERAL: no acute distress.  HEAD: normocephalic.   EYES: No scleral icterus  EARS: external ear without lesion, normal pinna shape and position.    NOSE: external nose without significant bony abnormality  ORAL CAVITY/OROPHARYNX: tongue mobile. Slight strawberry type appearance to taste buds at tip of tongue, no ulceration soft to palpation. No erythroplakia nor leukoplaia   NECK: trachea midline.   LYMPH NODES:No cervical lymphadenopathy.  RESPIRATORY: no stridor, no stertor. Voice normal. Respirations nonlabored.  NEURO: alert, responds to questions appropriately.    PSYCH:mood appropriate      Imaging:  The patient does not have any new imaging of the head and neck since last visit.     Labs:  CBC  Recent Labs   Lab 11/02/20  1258 08/07/21 2015   WBC 6.67 5.73   Hemoglobin 11.1 L 11.8 L   Hematocrit 34.2 L 34.8 L   MCV 92 85   Platelets 266 192     BMP  Recent Labs   Lab 11/02/20  1258 02/12/21  1042 08/07/21 2015   Glucose 209 H 332 H 223 H   Sodium 136 137 137   Potassium 4.4 5.1 4.7   Chloride 101 100 106   CO2 24 27 22 L   BUN 11 17 12   Creatinine 1.1 1.6 H 1.3   Calcium 9.3 9.2 8.4 L     COAGS  Recent Labs   Lab 08/04/20  1359 " 08/07/21  2030   INR 3.4 H 2.3 H       Assessment  1. Gustatory rhinitis    2. Glossitis     Plan:  Discussed plan of care with patient in detail and all questions answered. Patient reported understanding of plan of care.   Slightly poor historian so unclear if happens with eating but also could be vasomotor rhinitis so will try atrovent spray first before doing a potential nonallergic rhinitis regimen . I did offer him flonase as well in case of nonallergic rhinitis component ,but he  prefers to try nasal sprays one at a time. We also discussed about RFA procedure for vidian nerve if atrovent works but not entirely, could refer to dr. Knight for eval if he would be a candidate for this  Slight glossitis type appearance to tongue, nothing to biopsy . Trial of triamcinalone paste; if not resolving consider autoimmune labs    Offered scheduled f/u, he prefers prn      Please be aware that this note has been generated with the assistance of MModal voice-to-text.  Please excuse any spelling or grammatical errors.           Abdominal Pain, N/V/D

## 2023-03-15 NOTE — ED PROVIDER NOTE - PRINCIPAL DIAGNOSIS
Heart Failure Outpatient Progress Note - Joan Dickson 79 y o  female MRN: [id number]    @ Encounter: [id number]      Assessment/Plan:    Patient Active Problem List    Diagnosis Date Noted   • Left-sided chest wall pain 10/12/2022   • WANG (acute kidney injury) ([place] [address] ) 10/12/2022   • Hypertensive heart and chronic kidney disease with heart failure and stage 1 through stage 4 chronic kidney disease, or unspecified chronic kidney disease ([place] [address] ) 04/15/2021   • Restless leg syndrome 01/27/2020   • Debility 07/19/2019   • Acute on chronic combined systolic (congestive) and diastolic (congestive) heart failure ([place] [address] ) 07/15/2019   • Ambulatory dysfunction 07/12/2019   • Type 2 diabetes mellitus with diabetic cataract ([place] [address] ) 10/07/2015   • Hyperlipidemia 08/15/2012   • Hypertension 08/15/2012   • Hypothyroidism 08/15/2012   • Obesity (BMI 30-39 9) 08/15/2012   • Osteoarthritis 08/15/2012   • Paroxysmal atrial fibrillation ([place] [address] ) 03/07/2022       # Chronic HFimpEF w/ partial recovery, Stage C, NYHA III  Etiology: NICM/tachy-mediated given h/o AF with RVR , +/- ischemia given patient's risk factors for CAD, strong family history, and septal Q waves on EKG with severe septal hypokinesis on echo, less likely viral, toxin induced or infiltrative   S/P cardiogenic shock in past  Now S/P AVJ ablation with BiV AICD implant with no high rate episodes  Weight: 188 lbs  NT proBNP: 10/17/22: 4761  1/14/21: 716  6/2/20: 1089    Studies- personally reviewed by myself  Echo 3/8/23:  LVEF: 65%  RV: mildly dilated  LA moderately dilated  PASP: normal    Echo 11/20/20  LVEF: 60%  RV: mildly dilated, mildly reduced  PASP: 60 mmHg  RVOT: no notching    TTE completed on 03/16/2020 (not euvolemic): LVEF 30%  LVIDd 5 44 cm  Moderately dilated RV with moderately to markedly reduced RVSF  NAN  Moderate MR  Mild to moderate TR  Dilated IVC                Echocardiogram (limited) from 07/04/2019:  LVEF: 45%; mild diffuse hypokinesis     LVIDd: 3 8 cm   MR: No MR  Moderate annular calcification  Other: Dilated LA      Echocardiogram from 06/20/2019:  LVEF: 25%  LVIDd: 4 6 cm  RV: Dilated and hypokinetic  MR: Moderate      Neurohormonal Blockade:  --Beta Blocker: metoprolol succinate 100 mg Q12  --ACEi, ARB or ARNi: Entresto 49/51 mg BID    --SVR reduction: hydralazine 10 mg BID  --Aldosterone Receptor Blocker: Renal function precludes  --SGLT2i:   -- Diuretic : Bumex 4 mg once daily     Sudden Cardiac Death Risk Reduction:  Since been DC    --ICD: MDT DC HIS ICD  Interrogation 10/21/22:  99 8%, no high rate episodes, optivol normal  Interrogation 7/15/22: BVP 99 9%, elevated RV capture threshold  2 VT, longest 9 beats at 196 bpm,optivol normal     Electrical Resynchronization:  --Interrogation: Narrow QRS     Advanced Therapies (if appropriate): --Inotrope: N/A  --LVAD/Transplant Candidacy: Will continue to monitor      # Pulmonary Hypertension, WHO group 2 from now HFpEF and likely significant CORAZON, untreated (Group 3)  Stressed polysomnogram, continued volume management    # PAF w/ hx AF with RVR   S/p AV node ablation with CRT-D with Dr Simms Right 03/18/2020  Anticoagulation on Eliquis    Rate: metoprolol succinate 100 mg Q12    # HTN  Controlled       # HLD  On statin therapy  5/18/22: , HDL 57     # Hypothyroidism  History of radioablation in the past now on replacement therapy   TSH stable     # Type II DM        # CORAZON  Noncompliant with CPAP in the past    Still has not complete sleep study, will not     # CKD   Renal function stable last checked  , Cr 1 3 on 10/18/22     # History of transaminitis in setting of cardiogenic shock     #  Abnormal EKG  Q waves noted in septal leads concerning for possible old infarct   Lexiscan stress to eval for underlying ischemia still not completed      # H/O tachy-oniel syndrome       #  History of alcohol abuse   Has been abstinent for years    TODAY'S PLAN:  Indirect PA pressures looked good on 3/8 echo  Lipids need improvement with DM  Continue current diuretic for combined systolic and diastolic HF  I ordered sleep study as I do suspect hypoxia is contributing to her PH- she has yet to do and states will not do  Discussed weight management  --2g sodium diet  - Daily weights    HPI:      78 yo female who follows for chronic BiV heart failure, afib, HTN, HLD, CORAZON, DM2  Back in June 2019 admitted with afib with RVR and decompensated heart failure  Had not had a cardiac hx  Echo showed EF: 25-30%, underwent EBEN/ CV but went back into afib  Started on amiodarone load  Did have junctional rhythm immediate post conversion and showing signs of tachy oniel syndrome  Given these findings, PPM with possible AICD was suggested with cardioversion thereafter          On 6/25 patient was taken to the cath lab for planned dual chamber AICD but apparently became hypotensive and markedly SOB upon induction with propofol  She was subsequently transferred to the ICU for further management of her heart failure  She continued to be in [address] upon transfer  Through the night, patient's condition began to worsen and she became cool, clammy with N/V and loss of obtainable BP  Started on pressor support and lined  SVO2 at that point 32% with signs of lactic acidosis, WANG  Then started on milrinone gtt at 0 25 mcg/kg/min  She was able to be weaned off inotropic support  She saw Dr Stanford Cobb in follow up and her afib rates were high so amio stopped and focused on rate control with increase in Toprol to 75 mg BID  Admitted to [facility] from 03/11 to 03/24/2020 after presenting (as the recommendation of many of her providers) with bilateral LE edema and weight gain  In ED was found to have NT-proBNP of 4600 and be in atrial fibrillation with RVR (rates in 150s)  She was then started on IV diltiazem and received IV Lasix  Diltiazem was later stopped and amiodarone drip was started   Switched from IV Lasix to IV Bumex on 03/41  EP was consulted  TTE revealed LVEF 30% and decision was made to proceed with AV node ablation and CRT-D implantation once optimized  HF team recommended her being discharged home on Bumex 4 mg BID with PRN metolazone; however, she was discharged home on PO Bumex 4 mg daily without PRN metolazone prescribed  Lost 18 lbs during this admission  Was in hospital with acute on chronic HF, diuresed and discharged on Bumex 4 mg daily, Toprol  mg BID and Entresto 49/51 mg BID; decompensation felt to be due to holding diuretic and entresto, maybe MAP driven    Interval History:  Echo 3/8/23:  LVEF: 65%  RV: mildly dilated  LA moderately dilated  PASP: normal  Review of Systems   Constitutional: Negative for activity change, appetite change, fatigue and unexpected weight change  HENT: Negative for congestion and nosebleeds  Eyes: Negative  Respiratory: Negative for cough, chest tightness and shortness of breath  Cardiovascular: Negative for chest pain, palpitations and leg swelling  Gastrointestinal: Negative for abdominal distention  Endocrine: Negative  Genitourinary: Negative  Musculoskeletal: Negative  Skin: Negative  Neurological: Negative for dizziness, syncope and weakness  Hematological: Negative  Psychiatric/Behavioral: Negative  Past Medical History:   Diagnosis Date   • [facility] ) 6/26/2019   • CHF (congestive heart failure) ([facility])    • Eczema    • Photosensitivity     abnormal skin sensitivity to sunlight   • Uterine sarcoma (HCC)     staging         Allergies   Allergen Reactions   • Penicillins    • Wellbutrin Sr  [Bupropion]      Other reaction(s): Suicidal ideations  Category: Adverse Reaction;            Current Outpatient Medications:   •  apixaban (Eliquis) 5 mg, Take 1 tablet (5 mg total) by mouth 2 (two) times a day, Disp: 180 tablet, Rfl: 2  •  Ascorbic Acid (vitamin C) 1000 MG tablet, Take 1,000 mg by mouth 2 (two) times a day , Disp: , Rfl:   •  bumetanide (BUMEX) 2 mg tablet, TAKE 2 TABLETS (4 MG TOTAL) BY MOUTH DAILY, Disp: 60 tablet, Rfl: 0  •  calcium carbonate (OS-PAULA) 600 MG tablet, Take 600 mg by mouth 2 (two) times a day with meals, Disp: , Rfl:   •  cholecalciferol (VITAMIN D3) 1,000 units tablet, Take 1,000 Units by mouth daily 8,000 dailty, Disp: , Rfl:   •  hydrALAZINE (APRESOLINE) 10 mg tablet, TAKE 1 TABLET (10 MG TOTAL) BY MOUTH EVERY 8 (EIGHT) HOURS (Patient taking differently: Take 10 mg by mouth 2 (two) times a day), Disp: 90 tablet, Rfl: 5  •  latanoprost (XALATAN) 0 005 % ophthalmic solution, INSTILL ONE DROP IN THE RIGHT EYE AT BEDTIME, Disp: , Rfl:   •  levothyroxine 100 mcg tablet, TAKE 1 TABLET (100 MCG TOTAL) BY MOUTH DAILY, Disp: 30 tablet, Rfl: 5  •  Magnesium 400 MG CAPS, Take 1 capsule (400 mg total) by mouth daily, Disp: , Rfl: 0  •  metoprolol succinate (TOPROL-XL) 50 mg 24 hr tablet, TAKE 2 TABLETS (100 MG TOTAL) BY MOUTH EVERY 12 (TWELVE) HOURS, Disp: 120 tablet, Rfl: 5  •  Multiple Vitamin (MULTI-VITAMIN DAILY PO), 1 tablet 2 (two) times a day  , Disp: , Rfl:   •  Potassium Chloride ER 20 MEQ TBCR, TAKE 2 TABLETS (40 MEQ TOTAL) BY MOUTH 2 (TWO) TIMES A DAY, Disp: 120 tablet, Rfl: 2  •  rOPINIRole (REQUIP XL) 2 MG 24 hr tablet, TAKE 1 TABLET (2 MG TOTAL) BY MOUTH DAILY AT BEDTIME, Disp: 30 tablet, Rfl: 5  •  sacubitril-valsartan (Entresto) 49-51 MG TABS, Take 1 tablet by mouth 2 (two) times a day, Disp: 180 tablet, Rfl: 3  •  traMADol (ULTRAM) 50 mg tablet, ONE TABLET TWICE DAILY, Disp: 60 tablet, Rfl: 0  •  Turmeric (QC TUMERIC COMPLEX PO), Take by mouth 2 (two) times a day, Disp: , Rfl:   •  venlafaxine (EFFEXOR) 75 mg tablet, TAKE 1 TABLET (75 MG TOTAL) BY MOUTH EVERY 12 (TWELVE) HOURS, Disp: 60 tablet, Rfl: 5    Social History     Socioeconomic History   • Marital status: Single     Spouse name: Not on file   • Number of children: Not on file   • Years of education: Not on file   • Highest education level: Not on file   Occupational History   • Not on file   Tobacco Use   • Smoking status: Never   • Smokeless tobacco: Never   Vaping Use   • Vaping Use: Never used   Substance and Sexual Activity   • Alcohol use: Not Currently   • Drug use: Never   • Sexual activity: Not Currently   Other Topics Concern   • Not on file   Social History Narrative   • Not on file     Social Determinants of Health     Financial Resource Strain: Not on file   Food Insecurity: Not on file   Transportation Needs: Not on file   Physical Activity: Not on file   Stress: Not on file   Social Connections: Not on file   Intimate Partner Violence: Not on file   Housing Stability: Not on file       Family History   Problem Relation Age of Onset   • Alzheimer's disease Mother    • Coronary artery disease Mother    • Dementia Mother    • Diabetes Mother         mellitus   • Other Father         acute myocardial infarction   • Coronary artery disease Sister    • Other Maternal Grandfather         laryngeal cancer   • Dementia Maternal Aunt    • Diabetes Maternal Aunt        Physical Exam:    Vitals:   Vitals:    03/17/23 1452   BP: 102/58   Pulse: 91   SpO2: 100%       Physical Exam  Constitutional:       Appearance: She is well-developed  HENT:      Head: Normocephalic and atraumatic  Eyes:      Pupils: Pupils are equal, round, and reactive to light  Neck:      Vascular: No JVD  Cardiovascular:      Rate and Rhythm: Normal rate and regular rhythm  Heart sounds: No murmur heard  Pulmonary:      Effort: Pulmonary effort is normal  No respiratory distress  Breath sounds: Normal breath sounds  Abdominal:      General: There is no distension  Palpations: Abdomen is soft  Tenderness: There is no abdominal tenderness  Musculoskeletal:         General: Normal range of motion  Cervical back: Normal range of motion  Skin:     General: Skin is warm and dry  Findings: No rash     Neurological:      Mental Status: She is alert and oriented to person, place, and time  Labs & Results:    Lab Results   Component Value Date    SODIUM 137 11/30/2022    K 4 9 11/30/2022     11/30/2022    CO2 30 11/30/2022    BUN 19 11/30/2022    CREATININE 0 96 11/30/2022    GLUC 167 (H) 10/18/2022    CALCIUM 9 8 11/30/2022     Lab Results   Component Value Date    WBC 12 27 (H) 10/18/2022    HGB 11 8 10/18/2022    HCT 35 2 10/18/2022    MCV 97 10/18/2022     10/18/2022     Lab Results   Component Value Date    NTBNP 4,761 (H) 10/17/2022      Lab Results   Component Value Date    CHOLESTEROL 210 (H) 05/18/2022    CHOLESTEROL 112 03/12/2020    CHOLESTEROL 177 03/18/2019     Lab Results   Component Value Date    HDL 57 05/18/2022    HDL 38 (L) 03/12/2020    HDL 66 03/18/2019     Lab Results   Component Value Date    TRIG 142 05/18/2022    TRIG 48 03/12/2020    TRIG 82 03/18/2019     Lab Results   Component Value Date    NONHDLC 153 05/18/2022    [place] 74 03/12/2020    NONHDLC 106 02/07/2017       EKG personally reviewed by April High  Counseling / Coordination of Care  Time spent today 25 minutes  Greater than 50% of total time was spent with the patient and / or family counseling and / or coordination of care  We went over current diagnosis, most recent studies and any changes in treatment  Thank you for the opportunity to participate in the care of this patient      [address] [facility] PULMONARY HYPERTENSION  MEDICAL DIRECTOR OF [place] Ulcerative colitis

## 2023-07-04 NOTE — DISCHARGE NOTE ADULT - NURSING SECTION COMPLETE
Pt was discharged home per PIPPA [place]. VSS, RR unlabored on RA in no apparent distress. Pt well appearing and appropriate for developmental age. DC instructions provided to patient and parent at bedside via  (Jessika, [id number]). Education to return to ED if symptoms worsen. Instructed parents to follow up with patients pediatric orthopedic appointment for follow up care in 2 weeks. Parent and patient instructed not to get cast wet. Parent and patient verbalized understanding of instructions and education given. Pt leaving ED in stable condition.        Patient/Caregiver provided printed discharge information.

## 2023-12-28 NOTE — ED ADULT NURSE NOTE - PRO INTERPRETER NEED 2
except L shoulder flexion 80deg and L ankle dorsiflexion -10deg from neutral/bilateral upper extremity ROM was WFL (within functional limits)/bilateral lower extremity ROM was WFL (within functional limits) English

## 2024-03-15 NOTE — ED ADULT NURSE NOTE - CAS EDP DISCH DISPOSITION ADMI
Called patient regarding appt on 03/18/2024. Left message for patient to return call if any questions or concerns arise.   
[facility]

## 2024-08-20 NOTE — PATIENT PROFILE ADULT. - NS PRO OT REFERRAL QUES 1 YN
Quality 226: Preventive Care And Screening: Tobacco Use: Screening And Cessation Intervention: Patient screened for tobacco use and is an ex/non-smoker Quality 111:Pneumonia Vaccination Status For Older Adults: Pneumococcal Vaccination Previously Received Detail Level: Detailed Quality 474: Zoster Vaccination Status: Shingrix Vaccination Administered or Previously Received Quality 110: Preventive Care And Screening: Influenza Immunization: Influenza Immunization previously received during influenza season Quality 402: Tobacco Use And Help With Quitting Among Adolescents: Patient screened for tobacco and never smoked no No

## 2024-11-21 ENCOUNTER — NON-APPOINTMENT (OUTPATIENT)
Age: 44
End: 2024-11-21

## 2025-01-29 ENCOUNTER — APPOINTMENT (OUTPATIENT)
Dept: HUMAN REPRODUCTION | Facility: CLINIC | Age: 45
End: 2025-01-29

## 2025-01-30 ENCOUNTER — APPOINTMENT (OUTPATIENT)
Dept: HUMAN REPRODUCTION | Facility: CLINIC | Age: 45
End: 2025-01-30
Payer: SELF-PAY

## 2025-01-30 PROCEDURE — 89322 SEMEN ANAL STRICT CRITERIA: CPT

## 2025-02-26 NOTE — H&P ADULT - PROBLEM SELECTOR PLAN 2
-- DO NOT REPLY / DO NOT REPLY ALL --  -- This inbox is not monitored. If this was sent to the wrong provider or department, reroute message to P ECO Reroute pool. --  -- Message is from Engagement Center Operations (ECO) --    General Patient Message: Caller states is needing last clinic office notes fax # [phone] if any questions please call back  Caller Information       Contact Date/Time Type Contact Phone/Fax    02/26/2025 10:35 AM CST Phone (Incoming) jeremy [phone]            Alternative phone number: no    Can a detailed message be left? Yes - Voicemail   Patient has been advised the message will be addressed within 2-3 business days.                
Message has been addressed already, please see previous TE.   
outpatient follow up with GI